# Patient Record
Sex: MALE | Race: BLACK OR AFRICAN AMERICAN | NOT HISPANIC OR LATINO | Employment: OTHER | URBAN - METROPOLITAN AREA
[De-identification: names, ages, dates, MRNs, and addresses within clinical notes are randomized per-mention and may not be internally consistent; named-entity substitution may affect disease eponyms.]

---

## 2024-02-06 ENCOUNTER — HOSPITAL ENCOUNTER (INPATIENT)
Facility: HOSPITAL | Age: 69
LOS: 4 days | Discharge: HOME | DRG: 286 | End: 2024-02-10
Attending: EMERGENCY MEDICINE | Admitting: HOSPITALIST
Payer: MEDICARE

## 2024-02-06 DIAGNOSIS — R07.9 CHEST PAIN: ICD-10-CM

## 2024-02-06 DIAGNOSIS — I50.42 CHRONIC COMBINED SYSTOLIC AND DIASTOLIC CONGESTIVE HEART FAILURE: ICD-10-CM

## 2024-02-06 DIAGNOSIS — I50.9 ACUTE CONGESTIVE HEART FAILURE, UNSPECIFIED HEART FAILURE TYPE: Primary | ICD-10-CM

## 2024-02-06 DIAGNOSIS — R79.89 ELEVATED TROPONIN: ICD-10-CM

## 2024-02-06 DIAGNOSIS — I21.4 NSTEMI (NON-ST ELEVATED MYOCARDIAL INFARCTION): ICD-10-CM

## 2024-02-06 PROBLEM — I10 HYPERTENSION: Status: ACTIVE | Noted: 2024-02-06

## 2024-02-06 PROBLEM — I65.23 CAROTID STENOSIS, ASYMPTOMATIC, BILATERAL: Status: ACTIVE | Noted: 2024-02-06

## 2024-02-06 PROBLEM — I63.9 STROKE: Status: ACTIVE | Noted: 2024-02-06

## 2024-02-06 PROBLEM — E11.9 TYPE 2 DIABETES MELLITUS, WITHOUT LONG-TERM CURRENT USE OF INSULIN: Status: ACTIVE | Noted: 2024-02-06

## 2024-02-06 PROBLEM — I25.10 CAD (CORONARY ARTERY DISEASE): Status: ACTIVE | Noted: 2024-02-06

## 2024-02-06 PROBLEM — I50.43 ACUTE ON CHRONIC COMBINED SYSTOLIC AND DIASTOLIC CONGESTIVE HEART FAILURE: Status: ACTIVE | Noted: 2024-02-06

## 2024-02-06 LAB
ALBUMIN SERPL BCP-MCNC: 3 G/DL (ref 3.5–5.2)
ALBUMIN SERPL BCP-MCNC: 3.1 G/DL (ref 3.5–5.2)
ALP SERPL-CCNC: 63 U/L (ref 55–135)
ALP SERPL-CCNC: 66 U/L (ref 55–135)
ALT SERPL W/O P-5'-P-CCNC: 34 U/L (ref 10–44)
ALT SERPL W/O P-5'-P-CCNC: 35 U/L (ref 10–44)
ANION GAP SERPL CALC-SCNC: 11 MMOL/L (ref 8–16)
ANION GAP SERPL CALC-SCNC: 9 MMOL/L (ref 8–16)
AORTIC ROOT ANNULUS: 3.49 CM
ASCENDING AORTA: 3.62 CM
AST SERPL-CCNC: 22 U/L (ref 10–40)
AST SERPL-CCNC: 24 U/L (ref 10–40)
AV INDEX (PROSTH): 0.54
AV MEAN GRADIENT: 5 MMHG
AV PEAK GRADIENT: 8 MMHG
AV REGURGITATION PRESSURE HALF TIME: 878.63 MS
AV VALVE AREA BY VELOCITY RATIO: 1.81 CM²
AV VALVE AREA: 1.83 CM²
AV VELOCITY RATIO: 0.53
BASOPHILS # BLD AUTO: 0.02 K/UL (ref 0–0.2)
BASOPHILS # BLD AUTO: 0.02 K/UL (ref 0–0.2)
BASOPHILS NFR BLD: 0.5 % (ref 0–1.9)
BASOPHILS NFR BLD: 0.5 % (ref 0–1.9)
BILIRUB SERPL-MCNC: 1.3 MG/DL (ref 0.1–1)
BILIRUB SERPL-MCNC: 1.7 MG/DL (ref 0.1–1)
BNP SERPL-MCNC: 1238 PG/ML (ref 0–99)
BSA FOR ECHO PROCEDURE: 2.69 M2
BUN SERPL-MCNC: 13 MG/DL (ref 8–23)
BUN SERPL-MCNC: 13 MG/DL (ref 8–23)
CALCIUM SERPL-MCNC: 8.8 MG/DL (ref 8.7–10.5)
CALCIUM SERPL-MCNC: 8.9 MG/DL (ref 8.7–10.5)
CHLORIDE SERPL-SCNC: 109 MMOL/L (ref 95–110)
CHLORIDE SERPL-SCNC: 110 MMOL/L (ref 95–110)
CO2 SERPL-SCNC: 20 MMOL/L (ref 23–29)
CO2 SERPL-SCNC: 25 MMOL/L (ref 23–29)
CREAT SERPL-MCNC: 0.9 MG/DL (ref 0.5–1.4)
CREAT SERPL-MCNC: 0.9 MG/DL (ref 0.5–1.4)
CV ECHO LV RWT: 0.5 CM
DIFFERENTIAL METHOD BLD: ABNORMAL
DIFFERENTIAL METHOD BLD: ABNORMAL
DOP CALC AO PEAK VEL: 1.37 M/S
DOP CALC AO VTI: 25.1 CM
DOP CALC LVOT AREA: 3.4 CM2
DOP CALC LVOT DIAMETER: 2.08 CM
DOP CALC LVOT PEAK VEL: 0.73 M/S
DOP CALC LVOT STROKE VOLUME: 45.85 CM3
DOP CALC RVOT PEAK VEL: 0.49 M/S
DOP CALC RVOT VTI: 7.1 CM
DOP CALCLVOT PEAK VEL VTI: 13.5 CM
E WAVE DECELERATION TIME: 119.06 MSEC
E/A RATIO: 2.48
E/E' RATIO: 9.63 M/S
ECHO LV POSTERIOR WALL: 1.52 CM (ref 0.6–1.1)
EOSINOPHIL # BLD AUTO: 0.2 K/UL (ref 0–0.5)
EOSINOPHIL # BLD AUTO: 0.2 K/UL (ref 0–0.5)
EOSINOPHIL NFR BLD: 4.4 % (ref 0–8)
EOSINOPHIL NFR BLD: 4.5 % (ref 0–8)
ERYTHROCYTE [DISTWIDTH] IN BLOOD BY AUTOMATED COUNT: 17.5 % (ref 11.5–14.5)
ERYTHROCYTE [DISTWIDTH] IN BLOOD BY AUTOMATED COUNT: 18 % (ref 11.5–14.5)
EST. GFR  (NO RACE VARIABLE): >60 ML/MIN/1.73 M^2
EST. GFR  (NO RACE VARIABLE): >60 ML/MIN/1.73 M^2
FRACTIONAL SHORTENING: 9 % (ref 28–44)
GLUCOSE SERPL-MCNC: 115 MG/DL (ref 70–110)
GLUCOSE SERPL-MCNC: 133 MG/DL (ref 70–110)
HCT VFR BLD AUTO: 45.6 % (ref 40–54)
HCT VFR BLD AUTO: 47.4 % (ref 40–54)
HCV AB SERPL QL IA: NEGATIVE
HEP C VIRUS HOLD SPECIMEN: NORMAL
HGB BLD-MCNC: 15.2 G/DL (ref 14–18)
HGB BLD-MCNC: 15.5 G/DL (ref 14–18)
HIV 1+2 AB+HIV1 P24 AG SERPL QL IA: NEGATIVE
IMM GRANULOCYTES # BLD AUTO: 0.01 K/UL (ref 0–0.04)
IMM GRANULOCYTES # BLD AUTO: 0.01 K/UL (ref 0–0.04)
IMM GRANULOCYTES NFR BLD AUTO: 0.3 % (ref 0–0.5)
IMM GRANULOCYTES NFR BLD AUTO: 0.3 % (ref 0–0.5)
INTERVENTRICULAR SEPTUM: 1.52 CM (ref 0.6–1.1)
IVC DIAMETER: 2.65 CM
IVRT: 60.89 MSEC
LA MAJOR: 6.08 CM
LA MINOR: 5.85 CM
LA WIDTH: 4.4 CM
LEFT ATRIUM SIZE: 4.93 CM
LEFT ATRIUM VOLUME INDEX: 42.4 ML/M2
LEFT ATRIUM VOLUME: 109.94 CM3
LEFT INTERNAL DIMENSION IN SYSTOLE: 5.55 CM (ref 2.1–4)
LEFT VENTRICLE DIASTOLIC VOLUME INDEX: 71.42 ML/M2
LEFT VENTRICLE DIASTOLIC VOLUME: 184.97 ML
LEFT VENTRICLE MASS INDEX: 171 G/M2
LEFT VENTRICLE SYSTOLIC VOLUME INDEX: 58 ML/M2
LEFT VENTRICLE SYSTOLIC VOLUME: 150.23 ML
LEFT VENTRICULAR INTERNAL DIMENSION IN DIASTOLE: 6.07 CM (ref 3.5–6)
LEFT VENTRICULAR MASS: 443.56 G
LV LATERAL E/E' RATIO: 8.56 M/S
LV SEPTAL E/E' RATIO: 11 M/S
LVOT MG: 1.63 MMHG
LVOT MV: 0.63 CM/S
LYMPHOCYTES # BLD AUTO: 1 K/UL (ref 1–4.8)
LYMPHOCYTES # BLD AUTO: 1.2 K/UL (ref 1–4.8)
LYMPHOCYTES NFR BLD: 25.9 % (ref 18–48)
LYMPHOCYTES NFR BLD: 31.1 % (ref 18–48)
MAGNESIUM SERPL-MCNC: 2 MG/DL (ref 1.6–2.6)
MCH RBC QN AUTO: 29.6 PG (ref 27–31)
MCH RBC QN AUTO: 29.7 PG (ref 27–31)
MCHC RBC AUTO-ENTMCNC: 32.7 G/DL (ref 32–36)
MCHC RBC AUTO-ENTMCNC: 33.3 G/DL (ref 32–36)
MCV RBC AUTO: 89 FL (ref 82–98)
MCV RBC AUTO: 91 FL (ref 82–98)
MONOCYTES # BLD AUTO: 0.4 K/UL (ref 0.3–1)
MONOCYTES # BLD AUTO: 0.5 K/UL (ref 0.3–1)
MONOCYTES NFR BLD: 11 % (ref 4–15)
MONOCYTES NFR BLD: 14 % (ref 4–15)
MV PEAK A VEL: 0.31 M/S
MV PEAK E VEL: 0.77 M/S
MV STENOSIS PRESSURE HALF TIME: 34.53 MS
MV VALVE AREA P 1/2 METHOD: 6.37 CM2
NEUTROPHILS # BLD AUTO: 2 K/UL (ref 1.8–7.7)
NEUTROPHILS # BLD AUTO: 2.1 K/UL (ref 1.8–7.7)
NEUTROPHILS NFR BLD: 52.7 % (ref 38–73)
NEUTROPHILS NFR BLD: 54.8 % (ref 38–73)
NRBC BLD-RTO: 0 /100 WBC
NRBC BLD-RTO: 1 /100 WBC
OHS LV EJECTION FRACTION SIMPSONS BIPLANE MOD: 31 %
OHS QRS DURATION: 98 MS
OHS QTC CALCULATION: 484 MS
PHOSPHATE SERPL-MCNC: 4.3 MG/DL (ref 2.7–4.5)
PISA AR MAX VEL: 1.88 M/S
PISA MRMAX VEL: 3.61 M/S
PISA TR MAX VEL: 2.82 M/S
PLATELET # BLD AUTO: 162 K/UL (ref 150–450)
PLATELET # BLD AUTO: 194 K/UL (ref 150–450)
PMV BLD AUTO: 10.7 FL (ref 9.2–12.9)
PMV BLD AUTO: 11.2 FL (ref 9.2–12.9)
POTASSIUM SERPL-SCNC: 4.1 MMOL/L (ref 3.5–5.1)
POTASSIUM SERPL-SCNC: 4.3 MMOL/L (ref 3.5–5.1)
PROT SERPL-MCNC: 6.5 G/DL (ref 6–8.4)
PROT SERPL-MCNC: 6.6 G/DL (ref 6–8.4)
PV MEAN GRADIENT: 1 MMHG
RA MAJOR: 5.15 CM
RA PRESSURE ESTIMATED: 15 MMHG
RA WIDTH: 3.3 CM
RBC # BLD AUTO: 5.11 M/UL (ref 4.6–6.2)
RBC # BLD AUTO: 5.23 M/UL (ref 4.6–6.2)
RIGHT VENTRICULAR END-DIASTOLIC DIMENSION: 2.96 CM
RV TB RVSP: 18 MMHG
SODIUM SERPL-SCNC: 141 MMOL/L (ref 136–145)
SODIUM SERPL-SCNC: 143 MMOL/L (ref 136–145)
STJ: 3.66 CM
TDI LATERAL: 0.09 M/S
TDI SEPTAL: 0.07 M/S
TDI: 0.08 M/S
TR MAX PG: 32 MMHG
TR MEAN GRADIENT: 32 MMHG
TRICUSPID ANNULAR PLANE SYSTOLIC EXCURSION: 1.64 CM
TROPONIN I SERPL DL<=0.01 NG/ML-MCNC: 0.07 NG/ML (ref 0–0.03)
TROPONIN I SERPL DL<=0.01 NG/ML-MCNC: 0.07 NG/ML (ref 0–0.03)
TROPONIN I SERPL DL<=0.01 NG/ML-MCNC: 0.09 NG/ML (ref 0–0.03)
TROPONIN I SERPL DL<=0.01 NG/ML-MCNC: 0.11 NG/ML (ref 0–0.03)
TV REST PULMONARY ARTERY PRESSURE: 47 MMHG
WBC # BLD AUTO: 3.79 K/UL (ref 3.9–12.7)
WBC # BLD AUTO: 3.83 K/UL (ref 3.9–12.7)
Z-SCORE OF LEFT VENTRICULAR DIMENSION IN END DIASTOLE: -10.82
Z-SCORE OF LEFT VENTRICULAR DIMENSION IN END SYSTOLE: -5.06

## 2024-02-06 PROCEDURE — 85025 COMPLETE CBC W/AUTO DIFF WBC: CPT | Mod: 91 | Performed by: NURSE PRACTITIONER

## 2024-02-06 PROCEDURE — 96374 THER/PROPH/DIAG INJ IV PUSH: CPT

## 2024-02-06 PROCEDURE — 84100 ASSAY OF PHOSPHORUS: CPT | Performed by: NURSE PRACTITIONER

## 2024-02-06 PROCEDURE — 84484 ASSAY OF TROPONIN QUANT: CPT | Mod: 91 | Performed by: NURSE PRACTITIONER

## 2024-02-06 PROCEDURE — 25000003 PHARM REV CODE 250: Performed by: EMERGENCY MEDICINE

## 2024-02-06 PROCEDURE — 93010 ELECTROCARDIOGRAM REPORT: CPT | Mod: ,,, | Performed by: INTERNAL MEDICINE

## 2024-02-06 PROCEDURE — 80053 COMPREHEN METABOLIC PANEL: CPT | Performed by: EMERGENCY MEDICINE

## 2024-02-06 PROCEDURE — 99285 EMERGENCY DEPT VISIT HI MDM: CPT | Mod: 25

## 2024-02-06 PROCEDURE — 25000003 PHARM REV CODE 250: Performed by: NURSE PRACTITIONER

## 2024-02-06 PROCEDURE — 63600175 PHARM REV CODE 636 W HCPCS: Performed by: NURSE PRACTITIONER

## 2024-02-06 PROCEDURE — 85025 COMPLETE CBC W/AUTO DIFF WBC: CPT | Performed by: EMERGENCY MEDICINE

## 2024-02-06 PROCEDURE — 93005 ELECTROCARDIOGRAM TRACING: CPT

## 2024-02-06 PROCEDURE — 83735 ASSAY OF MAGNESIUM: CPT | Performed by: NURSE PRACTITIONER

## 2024-02-06 PROCEDURE — 99223 1ST HOSP IP/OBS HIGH 75: CPT | Mod: 25,,, | Performed by: INTERNAL MEDICINE

## 2024-02-06 PROCEDURE — 21400001 HC TELEMETRY ROOM

## 2024-02-06 PROCEDURE — 80053 COMPREHEN METABOLIC PANEL: CPT | Mod: 91 | Performed by: NURSE PRACTITIONER

## 2024-02-06 PROCEDURE — 84484 ASSAY OF TROPONIN QUANT: CPT | Performed by: EMERGENCY MEDICINE

## 2024-02-06 PROCEDURE — 36415 COLL VENOUS BLD VENIPUNCTURE: CPT | Mod: XB | Performed by: NURSE PRACTITIONER

## 2024-02-06 PROCEDURE — 86803 HEPATITIS C AB TEST: CPT | Performed by: EMERGENCY MEDICINE

## 2024-02-06 PROCEDURE — 87389 HIV-1 AG W/HIV-1&-2 AB AG IA: CPT | Performed by: EMERGENCY MEDICINE

## 2024-02-06 PROCEDURE — 63600175 PHARM REV CODE 636 W HCPCS: Performed by: EMERGENCY MEDICINE

## 2024-02-06 PROCEDURE — 83880 ASSAY OF NATRIURETIC PEPTIDE: CPT | Performed by: EMERGENCY MEDICINE

## 2024-02-06 PROCEDURE — 99900035 HC TECH TIME PER 15 MIN (STAT)

## 2024-02-06 RX ORDER — CARVEDILOL 6.25 MG/1
1 TABLET ORAL 2 TIMES DAILY
Status: ON HOLD | COMMUNITY
End: 2024-02-10 | Stop reason: HOSPADM

## 2024-02-06 RX ORDER — TALC
1 POWDER (GRAM) TOPICAL EVERY MORNING
COMMUNITY

## 2024-02-06 RX ORDER — AMLODIPINE BESYLATE 2.5 MG/1
1 TABLET ORAL DAILY
Status: ON HOLD | COMMUNITY
End: 2024-02-10

## 2024-02-06 RX ORDER — ATORVASTATIN CALCIUM 40 MG/1
80 TABLET, FILM COATED ORAL DAILY
Status: DISCONTINUED | OUTPATIENT
Start: 2024-02-06 | End: 2024-02-07

## 2024-02-06 RX ORDER — ALLOPURINOL 100 MG/1
100 TABLET ORAL DAILY
COMMUNITY
End: 2024-03-05 | Stop reason: SDUPTHER

## 2024-02-06 RX ORDER — ONDANSETRON HYDROCHLORIDE 2 MG/ML
4 INJECTION, SOLUTION INTRAVENOUS EVERY 8 HOURS PRN
Status: DISCONTINUED | OUTPATIENT
Start: 2024-02-06 | End: 2024-02-10 | Stop reason: HOSPADM

## 2024-02-06 RX ORDER — LABETALOL HYDROCHLORIDE 5 MG/ML
10 INJECTION, SOLUTION INTRAVENOUS EVERY 4 HOURS PRN
Status: DISCONTINUED | OUTPATIENT
Start: 2024-02-06 | End: 2024-02-10 | Stop reason: HOSPADM

## 2024-02-06 RX ORDER — AMLODIPINE BESYLATE 2.5 MG/1
2.5 TABLET ORAL DAILY
Status: DISCONTINUED | OUTPATIENT
Start: 2024-02-06 | End: 2024-02-10 | Stop reason: HOSPADM

## 2024-02-06 RX ORDER — FUROSEMIDE 10 MG/ML
40 INJECTION INTRAMUSCULAR; INTRAVENOUS
Status: COMPLETED | OUTPATIENT
Start: 2024-02-06 | End: 2024-02-06

## 2024-02-06 RX ORDER — CLOPIDOGREL BISULFATE 75 MG/1
1 TABLET ORAL DAILY
Status: ON HOLD | COMMUNITY
End: 2024-02-10

## 2024-02-06 RX ORDER — ATORVASTATIN CALCIUM 80 MG/1
80 TABLET, FILM COATED ORAL DAILY
Status: ON HOLD | COMMUNITY
End: 2024-02-10 | Stop reason: HOSPADM

## 2024-02-06 RX ORDER — LISINOPRIL 20 MG/1
40 TABLET ORAL DAILY
Status: DISCONTINUED | OUTPATIENT
Start: 2024-02-06 | End: 2024-02-08

## 2024-02-06 RX ORDER — HEPARIN SODIUM 5000 [USP'U]/ML
5000 INJECTION, SOLUTION INTRAVENOUS; SUBCUTANEOUS EVERY 8 HOURS
Status: DISCONTINUED | OUTPATIENT
Start: 2024-02-06 | End: 2024-02-10 | Stop reason: HOSPADM

## 2024-02-06 RX ORDER — NAPROXEN SODIUM 220 MG/1
81 TABLET, FILM COATED ORAL DAILY
Status: DISCONTINUED | OUTPATIENT
Start: 2024-02-06 | End: 2024-02-10 | Stop reason: HOSPADM

## 2024-02-06 RX ORDER — AMOXICILLIN 250 MG
1 CAPSULE ORAL 2 TIMES DAILY
Status: DISCONTINUED | OUTPATIENT
Start: 2024-02-06 | End: 2024-02-10 | Stop reason: HOSPADM

## 2024-02-06 RX ORDER — SODIUM CHLORIDE 0.9 % (FLUSH) 0.9 %
10 SYRINGE (ML) INJECTION
Status: DISCONTINUED | OUTPATIENT
Start: 2024-02-06 | End: 2024-02-10 | Stop reason: HOSPADM

## 2024-02-06 RX ORDER — ASPIRIN 325 MG
325 TABLET ORAL
Status: DISCONTINUED | OUTPATIENT
Start: 2024-02-06 | End: 2024-02-06

## 2024-02-06 RX ORDER — ACETAMINOPHEN 500 MG
1 TABLET ORAL DAILY
COMMUNITY

## 2024-02-06 RX ORDER — METFORMIN HYDROCHLORIDE 1000 MG/1
1000 TABLET ORAL 2 TIMES DAILY WITH MEALS
COMMUNITY

## 2024-02-06 RX ORDER — CLOPIDOGREL BISULFATE 75 MG/1
75 TABLET ORAL DAILY
Status: DISCONTINUED | OUTPATIENT
Start: 2024-02-06 | End: 2024-02-10 | Stop reason: HOSPADM

## 2024-02-06 RX ORDER — FUROSEMIDE 10 MG/ML
40 INJECTION INTRAMUSCULAR; INTRAVENOUS EVERY 12 HOURS
Status: DISCONTINUED | OUTPATIENT
Start: 2024-02-06 | End: 2024-02-10

## 2024-02-06 RX ORDER — CARVEDILOL 6.25 MG/1
6.25 TABLET ORAL 2 TIMES DAILY
Status: DISCONTINUED | OUTPATIENT
Start: 2024-02-06 | End: 2024-02-08

## 2024-02-06 RX ORDER — LISINOPRIL 40 MG/1
1 TABLET ORAL DAILY
Status: ON HOLD | COMMUNITY
End: 2024-02-10 | Stop reason: HOSPADM

## 2024-02-06 RX ORDER — ACETAMINOPHEN 325 MG/1
650 TABLET ORAL EVERY 6 HOURS PRN
Status: DISCONTINUED | OUTPATIENT
Start: 2024-02-06 | End: 2024-02-08 | Stop reason: SDUPTHER

## 2024-02-06 RX ORDER — ASPIRIN 325 MG
325 TABLET ORAL
Status: COMPLETED | OUTPATIENT
Start: 2024-02-06 | End: 2024-02-06

## 2024-02-06 RX ORDER — LEVOTHYROXINE SODIUM 25 UG/1
25 TABLET ORAL EVERY MORNING
Status: DISCONTINUED | OUTPATIENT
Start: 2024-02-06 | End: 2024-02-10 | Stop reason: HOSPADM

## 2024-02-06 RX ORDER — LEVOTHYROXINE SODIUM 25 UG/1
1 TABLET ORAL EVERY MORNING
COMMUNITY

## 2024-02-06 RX ADMIN — FUROSEMIDE 40 MG: 10 INJECTION, SOLUTION INTRAMUSCULAR; INTRAVENOUS at 10:02

## 2024-02-06 RX ADMIN — AMLODIPINE BESYLATE 2.5 MG: 2.5 TABLET ORAL at 09:02

## 2024-02-06 RX ADMIN — LISINOPRIL 40 MG: 20 TABLET ORAL at 09:02

## 2024-02-06 RX ADMIN — SENNOSIDES AND DOCUSATE SODIUM 1 TABLET: 8.6; 5 TABLET ORAL at 10:02

## 2024-02-06 RX ADMIN — LEVOTHYROXINE SODIUM 25 MCG: 0.03 TABLET ORAL at 07:02

## 2024-02-06 RX ADMIN — CARVEDILOL 6.25 MG: 6.25 TABLET, FILM COATED ORAL at 10:02

## 2024-02-06 RX ADMIN — SENNOSIDES AND DOCUSATE SODIUM 1 TABLET: 8.6; 5 TABLET ORAL at 09:02

## 2024-02-06 RX ADMIN — ASPIRIN 325 MG ORAL TABLET 325 MG: 325 PILL ORAL at 01:02

## 2024-02-06 RX ADMIN — FUROSEMIDE 40 MG: 10 INJECTION, SOLUTION INTRAMUSCULAR; INTRAVENOUS at 02:02

## 2024-02-06 RX ADMIN — ASPIRIN 81 MG CHEWABLE TABLET 81 MG: 81 TABLET CHEWABLE at 09:02

## 2024-02-06 RX ADMIN — CLOPIDOGREL BISULFATE 75 MG: 75 TABLET ORAL at 09:02

## 2024-02-06 RX ADMIN — CARVEDILOL 6.25 MG: 6.25 TABLET, FILM COATED ORAL at 09:02

## 2024-02-06 RX ADMIN — HEPARIN SODIUM 5000 UNITS: 5000 INJECTION INTRAVENOUS; SUBCUTANEOUS at 10:02

## 2024-02-06 RX ADMIN — HEPARIN SODIUM 5000 UNITS: 5000 INJECTION INTRAVENOUS; SUBCUTANEOUS at 02:02

## 2024-02-06 NOTE — ASSESSMENT & PLAN NOTE
Patient with known CAD s/p  NONE , which is uncontrolled Will continue  BB, ASA, Plavix, and Statin and monitor for S/Sx of angina/ACS. Continue to monitor on telemetry.

## 2024-02-06 NOTE — ASSESSMENT & PLAN NOTE
Troponin flat, likely demand  Echo with low EF  Recommend R/LHC once compensated  Cont ASA, statin, BB

## 2024-02-06 NOTE — ED PROVIDER NOTES
SCRIBE #1 NOTE: I, Beatriz Boudreaux, am scribing for, and in the presence of, Hayley Mahan MD. I have scribed the entire note.       History     Chief Complaint   Patient presents with    Chest Pain     C/o CP and HTN. CP comes and goes.      Review of patient's allergies indicates:   Allergen Reactions    Statins-hmg-coa reductase inhibitors Other (See Comments)         History of Present Illness     HPI    2/6/2024, 1:56 AM  History obtained from the patient  Additional history obtained from an independent historian: patient's family at bedside      History of Present Illness: Ron Matthew is a 68 y.o. male patient who presents to the Emergency Department for evaluation of SOB which onset 1 week PTA. Symptoms are constant and moderate in severity. The patient's symptoms are worse with exertion and his family states that he has not been able to sleep or lay flat comfortable for the past few nights. Associated sxs include cough, intermittent CP, and BLE swelling. Patient denies any fever, chills, N/V, abdominal pain, and all other sxs at this time. No prior Tx reported. No further complaints or concerns at this time.       Arrival mode: Personal vehicle    PCP: No primary care provider on file.        Past Medical History:  No past medical history on file.    Past Surgical History:  No past surgical history on file.      Family History:  No family history on file.    Social History:  Social History     Tobacco Use    Smoking status: Not on file    Smokeless tobacco: Not on file   Substance and Sexual Activity    Alcohol use: Not on file    Drug use: Not on file    Sexual activity: Not on file        Review of Systems     Review of Systems   Constitutional:  Negative for chills and fever.   HENT:  Negative for sore throat.    Respiratory:  Positive for cough and shortness of breath.    Cardiovascular:  Positive for chest pain and leg swelling (BLE).   Gastrointestinal:  Negative for abdominal pain, nausea and  vomiting.   Genitourinary:  Negative for dysuria.   Musculoskeletal:  Negative for back pain.   Skin:  Negative for rash.   Neurological:  Negative for weakness.   Hematological:  Does not bruise/bleed easily.   All other systems reviewed and are negative.     Physical Exam     Initial Vitals [02/06/24 0013]   BP Pulse Resp Temp SpO2   (!) 151/95 80 18 97.6 °F (36.4 °C) 98 %      MAP       --          Physical Exam  Nursing Notes and Vital Signs Reviewed.  Constitutional: Patient is in no acute distress. Well-developed and well-nourished.  Head: Atraumatic. Normocephalic.  Eyes: PERRL. EOM intact. Conjunctivae are not pale. No scleral icterus.  ENT: Mucous membranes are moist. Oropharynx is clear and symmetric.    Neck: Supple. Full ROM. No lymphadenopathy. Mild right sided JVD.   Cardiovascular: Regular rate. Regular rhythm. No murmurs, rubs, or gallops. Distal pulses are 2+ and symmetric.  Pulmonary/Chest: No respiratory distress. Clear to auscultation bilaterally. No wheezing or rales.  Abdominal: Soft and non-distended.  There is no tenderness.  No rebound, guarding, or rigidity. Good bowel sounds.  Genitourinary: No CVA tenderness  Musculoskeletal: Moves all extremities. No obvious deformities. 1+ BLE edema. No calf tenderness.  Skin: Warm and dry.  Neurological:  Alert, awake, and appropriate.  Normal speech.  No acute focal neurological deficits are appreciated.  Psychiatric: Normal affect. Good eye contact. Appropriate in content.     ED Course   Critical Care    Date/Time: 2/6/2024 2:46 AM    Performed by: Hayley Mahan MD  Authorized by: Hayley Mahan MD  Direct patient critical care time: 21 minutes  Additional history critical care time: 4 minutes  Ordering / reviewing critical care time: 11 minutes  Documentation critical care time: 7 minutes  Consulting other physicians critical care time: 9 minutes  Total critical care time (exclusive of procedural time) : 52 minutes  Critical care time was  "exclusive of separately billable procedures and treating other patients and teaching time.  Critical care was necessary to treat or prevent imminent or life-threatening deterioration of the following conditions: acute CHF.  Critical care was time spent personally by me on the following activities: blood draw for specimens, development of treatment plan with patient or surrogate, discussions with consultants, interpretation of cardiac output measurements, evaluation of patient's response to treatment, examination of patient, obtaining history from patient or surrogate, ordering and performing treatments and interventions, ordering and review of radiographic studies, ordering and review of laboratory studies, pulse oximetry, re-evaluation of patient's condition and review of old charts.        ED Vital Signs:  Vitals:    02/06/24 0013 02/06/24 0105 02/06/24 0129 02/06/24 0130   BP: (!) 151/95 (!) 148/105     Pulse: 80 91 81    Resp: 18 17     Temp: 97.6 °F (36.4 °C)      TempSrc: Oral      SpO2: 98% 96%     Weight: (!) 140.2 kg (309 lb 1.4 oz)      Height:    6' 1" (1.854 m)    02/06/24 0300 02/06/24 0330 02/06/24 0335 02/06/24 0400   BP: (!) 140/83 (!) 144/94  (!) 162/106   Pulse: 83 89 80 81   Resp: (!) 22 (!) 26 (!) 21 (!) 21   Temp:       TempSrc:       SpO2: 95% 95% 98% 100%   Weight:       Height:        02/06/24 0500 02/06/24 0530   BP: (!) 138/99 (!) 163/106   Pulse: 83 82   Resp: 20 (!) 21   Temp:     TempSrc:     SpO2: 97% 100%   Weight:     Height:         Abnormal Lab Results:  Labs Reviewed   CBC W/ AUTO DIFFERENTIAL - Abnormal; Notable for the following components:       Result Value    WBC 3.83 (*)     RDW 17.5 (*)     nRBC 1 (*)     All other components within normal limits   COMPREHENSIVE METABOLIC PANEL - Abnormal; Notable for the following components:    CO2 20 (*)     Glucose 133 (*)     Albumin 3.0 (*)     Total Bilirubin 1.3 (*)     All other components within normal limits   TROPONIN I - " Abnormal; Notable for the following components:    Troponin I 0.109 (*)     All other components within normal limits   B-TYPE NATRIURETIC PEPTIDE - Abnormal; Notable for the following components:    BNP 1,238 (*)     All other components within normal limits   HIV 1 / 2 ANTIBODY    Narrative:     Release to patient->Immediate   HEPATITIS C ANTIBODY    Narrative:     Release to patient->Immediate   HEP C VIRUS HOLD SPECIMEN    Narrative:     Release to patient->Immediate        All Lab Results:  Results for orders placed or performed during the hospital encounter of 02/06/24   HIV 1/2 Ag/Ab (4th Gen)   Result Value Ref Range    HIV 1/2 Ag/Ab Negative Negative   Hepatitis C Antibody   Result Value Ref Range    Hepatitis C Ab Negative Negative   HCV Virus Hold Specimen   Result Value Ref Range    HEP C Virus Hold Specimen Hold for HCV sendout    CBC auto differential   Result Value Ref Range    WBC 3.83 (L) 3.90 - 12.70 K/uL    RBC 5.11 4.60 - 6.20 M/uL    Hemoglobin 15.2 14.0 - 18.0 g/dL    Hematocrit 45.6 40.0 - 54.0 %    MCV 89 82 - 98 fL    MCH 29.7 27.0 - 31.0 pg    MCHC 33.3 32.0 - 36.0 g/dL    RDW 17.5 (H) 11.5 - 14.5 %    Platelets 194 150 - 450 K/uL    MPV 11.2 9.2 - 12.9 fL    Immature Granulocytes 0.3 0.0 - 0.5 %    Gran # (ANC) 2.0 1.8 - 7.7 K/uL    Immature Grans (Abs) 0.01 0.00 - 0.04 K/uL    Lymph # 1.2 1.0 - 4.8 K/uL    Mono # 0.4 0.3 - 1.0 K/uL    Eos # 0.2 0.0 - 0.5 K/uL    Baso # 0.02 0.00 - 0.20 K/uL    nRBC 1 (A) 0 /100 WBC    Gran % 52.7 38.0 - 73.0 %    Lymph % 31.1 18.0 - 48.0 %    Mono % 11.0 4.0 - 15.0 %    Eosinophil % 4.4 0.0 - 8.0 %    Basophil % 0.5 0.0 - 1.9 %    Differential Method Automated    Comprehensive metabolic panel   Result Value Ref Range    Sodium 141 136 - 145 mmol/L    Potassium 4.3 3.5 - 5.1 mmol/L    Chloride 110 95 - 110 mmol/L    CO2 20 (L) 23 - 29 mmol/L    Glucose 133 (H) 70 - 110 mg/dL    BUN 13 8 - 23 mg/dL    Creatinine 0.9 0.5 - 1.4 mg/dL    Calcium 8.8 8.7 - 10.5  mg/dL    Total Protein 6.5 6.0 - 8.4 g/dL    Albumin 3.0 (L) 3.5 - 5.2 g/dL    Total Bilirubin 1.3 (H) 0.1 - 1.0 mg/dL    Alkaline Phosphatase 63 55 - 135 U/L    AST 24 10 - 40 U/L    ALT 34 10 - 44 U/L    eGFR >60 >60 mL/min/1.73 m^2    Anion Gap 11 8 - 16 mmol/L   Troponin I #1   Result Value Ref Range    Troponin I 0.109 (H) 0.000 - 0.026 ng/mL   BNP   Result Value Ref Range    BNP 1,238 (H) 0 - 99 pg/mL         Imaging Results:  Imaging Results              X-Ray Chest AP Portable (In process)                    Type of Interpretation: ED Physician (Independently Interpreted).  Radiology Procedure Done: Portable CXR.  Interpretation: Cardiomegaly. Pulmonary congestion.             The EKG was ordered, reviewed, and independently interpreted by the ED provider.  Interpretation time: 00:16  Rate: 93 BPM  Rhythm: Sinus rhythm with premature supraventricular complexes  Interpretation: Right axis deviation. Abnormal QRS -T angle, consider primary T wave abnormality. Prolonged QT. No STEMI.           The Emergency Provider reviewed the vital signs and test results, which are outlined above.     ED Discussion     2:45 AM: Discussed case with Elba Carbone NP (Hospital Medicine). Dr. Hebert agrees with current care and management of pt and accepts admission.   Admitting Service: Hospital Medicine  Admitting Physician: Dr. Hebert  Admit to: Inpatient Med/Tele    2:45 AM: Re-evaluated pt. I have discussed test results, shared treatment plan, and the need for admission with patient and family at bedside. Pt and family express understanding at this time and agree with all information. All questions answered. Pt and family have no further questions or concerns at this time. Pt is ready for admit.       Medical Decision Making  Amount and/or Complexity of Data Reviewed  Labs: ordered. Decision-making details documented in ED Course.  Radiology: ordered and independent interpretation performed. Decision-making details  documented in ED Course.  ECG/medicine tests: ordered and independent interpretation performed. Decision-making details documented in ED Course.    Risk  OTC drugs.  Prescription drug management.  Decision regarding hospitalization.    Critical Care  Total time providing critical care: 52 minutes                ED Medication(s):  Medications   aspirin tablet 325 mg (325 mg Oral Given 2/6/24 0133)   furosemide injection 40 mg (40 mg Intravenous Given 2/6/24 0250)       New Prescriptions    No medications on file               Scribe Attestation:   Scribe #1: I performed the above scribed service and the documentation accurately describes the services I performed. I attest to the accuracy of the note.     Attending:   Physician Attestation Statement for Scribe #1: I, Hayley Mahan MD, personally performed the services described in this documentation, as scribed by Beatriz Boudreaux, in my presence, and it is both accurate and complete.           Clinical Impression       ICD-10-CM ICD-9-CM   1. Acute congestive heart failure, unspecified heart failure type  I50.9 428.0   2. Chest pain  R07.9 786.50   3. NSTEMI (non-ST elevated myocardial infarction)  I21.4 410.70       Disposition:   Disposition: Admitted  Condition: Serious         Hayley Mahan MD  02/06/24 0549

## 2024-02-06 NOTE — ASSESSMENT & PLAN NOTE
Patient is identified as having Combined Systolic and Diastolic heart failure that is Acute on chronic. CHF is currently uncontrolled due to Continued edema of extremities and JVD, Rales/crackles on pulmonary exam, and Pulmonary edema/pleural effusion on CXR. Latest ECHO performed and demonstrates- No results found for this or any previous visit.  . Continue Beta Blocker, ACE/ARB, and Furosemide and monitor clinical status closely. Monitor on telemetry. Patient is on CHF pathway.  Monitor strict Is&Os and daily weights.  Place on fluid restriction of 1.5 L. Cardiology has been consulted. Continue to stress to patient importance of self efficacy and  on diet for CHF. Last BNP reviewed- and noted below   Recent Labs   Lab 02/06/24  0136   BNP 1,238*     IV Lasix  Echo   Cardiology consult

## 2024-02-06 NOTE — ASSESSMENT & PLAN NOTE
Chronic, uncontrolled. Latest blood pressure and vitals reviewed-     Temp:  [97.6 °F (36.4 °C)]   Pulse:  [80-91]   Resp:  [17-26]   BP: (135-163)/()   SpO2:  [95 %-100 %] .   Home meds for hypertension were reviewed and noted below.   Hypertension Medications               amLODIPine (NORVASC) 2.5 MG tablet Take 1 tablet by mouth once daily.    carvediloL (COREG) 6.25 MG tablet Take 1 tablet by mouth 2 (two) times daily.    lisinopriL (PRINIVIL,ZESTRIL) 40 MG tablet Take 1 tablet by mouth once daily.            While in the hospital, will manage blood pressure as follows; Continue home antihypertensive regimen    Will utilize p.r.n. blood pressure medication only if patient's blood pressure greater than 140/90 and he develops symptoms such as worsening chest pain or shortness of breath.

## 2024-02-06 NOTE — H&P (VIEW-ONLY)
[facility] - Tele[facility] (Hospital)  Cardiology  Consult Note    Patient Name: Ron Matthew  MRN: [id number]  Admission Date: 2/6/2024  Hospital Length of Stay: 0 days  Code Status: Full Code   Attending Provider: Heidi Hernandez,*   Consulting Provider: Zhane Ta NP  Primary Care Physician: No primary care provider on file.  Principal Problem:Acute on chronic combined systolic and diastolic congestive heart failure    Patient information was obtained from patient and ER records.     Inpatient consult to Cardiology  Consult performed by: Zhane Ta NP  Consult ordered by: Elba Carbone NP        Subjective:     Chief Complaint:  SOB     HPI:   The patient is a 67 yo male with CAD, CHF-EF 45%, DM, HTN, HLD, Gout, JAMES, Prostate cancer, Stroke with right sided hemiparesis in 2020, Carotid dissection and SAH 1/2023 who presented to ED with SOB, BLE edema, and orthopnea x 5 days. Spouse reports he has not slept for several days due SOB. He did complain of chest pain yesterday but it resolved.   In the ED, BP mildly elevated, Afebrile, Oxygenation stable. +tachypnea. WBC 3.8, BNP 1238, Troponin 0.109. EKG-NSR with PACs, prolonged QT, no ischemia. CXR- Cardiomegaly. Pulmonary congestion  Cardiology consulted to assist with management.Pt seen and examined today in ED, c/o SOB, denies any CP at this time. Reports SAH last year on plavix. Labs reviewed, Crt 0.9, troponin flat, BNP elevated 1238 Echo with EF 25-30%, PA 47    Past Medical History:   Diagnosis Date    CAD (coronary artery disease)     Carotid artery dissection     CHF (congestive heart failure)     Gout, unspecified     HLD (hyperlipidemia)     HTN (hypertension)     Hyperparathyroidism, unspecified     JAMES (obstructive sleep apnea)     Prostate cancer     SAH (subarachnoid hemorrhage) 01/2023    Stroke 2020    Type 2 diabetes mellitus without complications        Past Surgical History:   Procedure Laterality Date     ANGIOGRAM, CAROTID      ANGIOGRAM, VERTEBRAL      PARATHYROIDECTOMY         Review of patient's allergies indicates:   Allergen Reactions    Statins-hmg-coa reductase inhibitors Other (See Comments)       No current facility-administered medications on file prior to encounter.     Current Outpatient Medications on File Prior to Encounter   Medication Sig    allopurinoL (ZYLOPRIM) 100 MG tablet Take 100 mg by mouth daily as needed.    amLODIPine (NORVASC) 2.5 MG tablet Take 1 tablet by mouth once daily.    aspirin 81 mg Cap Take 81 mg by mouth once daily.    carvediloL (COREG) 6.25 MG tablet Take 1 tablet by mouth 2 (two) times daily.    cholecalciferol, vitamin D3, (VITAMIN D3) 50 mcg (2,000 unit) Cap capsule Take 1 capsule by mouth once daily.    clopidogreL (PLAVIX) 75 mg tablet Take 1 tablet by mouth once daily.    DOCOSAHEXAENOIC ACID ORAL Take 1 capsule by mouth once daily.    empagliflozin (JARDIANCE) 25 mg tablet Take 1 tablet by mouth once daily.    levothyroxine (SYNTHROID) 25 MCG tablet Take 1 tablet by mouth every morning.    lisinopriL (PRINIVIL,ZESTRIL) 40 MG tablet Take 1 tablet by mouth once daily.    magnesium oxide (MAG-OX) 250 mg magnesium Tab Take 1 tablet by mouth every morning.    metFORMIN (GLUCOPHAGE) 1000 MG tablet Take 1,000 mg by mouth 2 (two) times daily with meals.    atorvastatin (LIPITOR) 80 MG tablet Take 80 mg by mouth once daily.     Family History       Problem Relation (Age of Onset)    Diabetes Mother    Hypertension Mother, Father          Tobacco Use    Smoking status: Never    Smokeless tobacco: Not on file   Substance and Sexual Activity    Alcohol use: Never    Drug use: Never    Sexual activity: Not on file     Review of Systems   Constitutional: Positive for malaise/fatigue.   HENT: Negative.     Eyes: Negative.    Cardiovascular:  Positive for leg swelling.   Respiratory:  Positive for shortness of breath.    Skin: Negative.    Musculoskeletal: Negative.     Gastrointestinal: Negative.    Genitourinary: Negative.    Neurological:  Positive for weakness.   Psychiatric/Behavioral: Negative.       Objective:     Vital Signs (Most Recent):  Temp: 96.2 °F (35.7 °C) (02/06/24 1238)  Pulse: 86 (02/06/24 1238)  Resp: 18 (02/06/24 1238)  BP: 128/83 (02/06/24 1238)  SpO2: 100 % (02/06/24 1238) Vital Signs (24h Range):  Temp:  [96.2 °F (35.7 °C)-98.6 °F (37 °C)] 96.2 °F (35.7 °C)  Pulse:  [80-91] 86  Resp:  [17-26] 18  SpO2:  [95 %-100 %] 100 %  BP: (123-165)/() 128/83     Weight: (!) 140.2 kg (309 lb)  Body mass index is 40.77 kg/m².    SpO2: 100 %         Intake/Output Summary (Last 24 hours) at 2/6/2024 1402  Last data filed at 2/6/2024 0632  Gross per 24 hour   Intake --   Output 1300 ml   Net -1300 ml       Lines/Drains/Airways       Peripheral Intravenous Line  Duration                  Peripheral IV - Single Lumen 02/06/24 0128 18 G Left Antecubital <1 day                     Physical Exam  Vitals and nursing note reviewed.   Constitutional:       Appearance: Normal appearance.   HENT:      Head: Normocephalic and atraumatic.   Eyes:      General:         Right eye: No discharge.         Left eye: No discharge.      Pupils: Pupils are equal, round, and reactive to light.   Cardiovascular:      Rate and Rhythm: Normal rate and regular rhythm.      Heart sounds: S1 normal and S2 normal. No murmur heard.     No friction rub.   Pulmonary:      Effort: Pulmonary effort is normal. No respiratory distress.      Breath sounds: Normal breath sounds. No rales.      Comments: 3LNC  Abdominal:      Palpations: Abdomen is soft.      Tenderness: There is no abdominal tenderness.   Musculoskeletal:      Cervical back: Neck supple.      Right lower leg: Edema present.      Left lower leg: Edema present.   Skin:     General: Skin is warm and dry.   Neurological:      General: No focal deficit present.      Mental Status: He is alert and oriented to person, place, and time.  "  Psychiatric:         Mood and Affect: Mood normal.         Behavior: Behavior normal.         Thought Content: Thought content normal.          Significant Labs: BMP:   Recent Labs   Lab 02/06/24  0136 02/06/24  0654   * 115*    143   K 4.3 4.1    109   CO2 20* 25   BUN 13 13   CREATININE 0.9 0.9   CALCIUM 8.8 8.9   MG  --  2.0   , CMP   Recent Labs   Lab 02/06/24  0136 02/06/24  0654    143   K 4.3 4.1    109   CO2 20* 25   * 115*   BUN 13 13   CREATININE 0.9 0.9   CALCIUM 8.8 8.9   PROT 6.5 6.6   ALBUMIN 3.0* 3.1*   BILITOT 1.3* 1.7*   ALKPHOS 63 66   AST 24 22   ALT 34 35   ANIONGAP 11 9   , CBC   Recent Labs   Lab 02/06/24  0136 02/06/24  0654   WBC 3.83* 3.79*   HGB 15.2 15.5   HCT 45.6 47.4    162   , INR No results for input(s): "INR", "PROTIME" in the last 48 hours., Lipid Panel No results for input(s): "CHOL", "HDL", "LDLCALC", "TRIG", "CHOLHDL" in the last 48 hours., Troponin   Recent Labs   Lab 02/06/24  0136 02/06/24  0654 02/06/24  1334   TROPONINI 0.109* 0.089* 0.073*   , and All pertinent lab results from the last 24 hours have been reviewed.    Significant Imaging: Cardiac Cath: reviewed, Echocardiogram: Transthoracic echo (TTE) complete (Cupid Only):   Results for orders placed or performed during the hospital encounter of 02/06/24   Echo   Result Value Ref Range    BSA 2.69 m2    Greene's Biplane MOD Ejection Fraction 31 %    LVOT stroke volume 45.85 cm3    LVIDd 6.07 (A) 3.5 - 6.0 cm    LV Systolic Volume 150.23 mL    LV Systolic Volume Index 58.0 mL/m2    LVIDs 5.55 (A) 2.1 - 4.0 cm    LV Diastolic Volume 184.97 mL    LV Diastolic Volume Index 71.42 mL/m2    IVS 1.52 (A) 0.6 - 1.1 cm    LVOT diameter 2.08 cm    LVOT area 3.4 cm2    FS 9 (A) 28 - 44 %    Left Ventricle Relative Wall Thickness 0.50 cm    Posterior Wall 1.52 (A) 0.6 - 1.1 cm    LV mass 443.56 g    LV Mass Index 171 g/m2    MV Peak E Simon 0.77 m/s    TDI LATERAL 0.09 m/s    TDI SEPTAL " 0.07 m/s    E/E' ratio 9.63 m/s    MV Peak A Simon 0.31 m/s    TR Max Simon 2.82 m/s    E/A ratio 2.48     IVRT 60.89 msec    E wave deceleration time 119.06 msec    LV SEPTAL E/E' RATIO 11.00 m/s    LV LATERAL E/E' RATIO 8.56 m/s    LVOT peak simon 0.73 m/s    Left Ventricular Outflow Tract Mean Velocity 0.63 cm/s    Left Ventricular Outflow Tract Mean Gradient 1.63 mmHg    RVDD 2.96 cm    RVOT peak VTI 7.1 cm    TAPSE 1.64 cm    LA size 4.93 cm    Left Atrium Minor Axis 5.85 cm    Left Atrium Major Axis 6.08 cm    RA Major Axis 5.15 cm    AV regurgitation pressure 1/2 time [id number] ms    AR Max Simon 1.88 m/s    AV mean gradient 5 mmHg    AV peak gradient 8 mmHg    Ao peak simon 1.37 m/s    Ao VTI 25.10 cm    LVOT peak VTI 13.50 cm    AV valve area 1.83 cm²    AV Velocity Ratio 0.53     AV index (prosthetic) 0.54     GINGER by Velocity Ratio 1.81 cm²    Mr max simon 3.61 m/s    MV stenosis pressure 1/2 time 34.53 ms    MV valve area p 1/2 method 6.37 cm2    TV mean gradient 32 mmHg    Triscuspid Valve Regurgitation Peak Gradient 32 mmHg    PV mean gradient 1 mmHg    RVOT peak simon 0.49 m/s    Ao root annulus 3.49 cm    STJ 3.66 cm    Ascending aorta 3.62 cm    IVC diameter 2.65 cm    Mean e' 0.08 m/s    ZLVIDS -5.06     ZLVIDD -10.82     LA Volume Index 42.4 mL/m2    LA volume 109.94 cm3    LA WIDTH 4.4 cm    RA Width 3.3 cm    TV resting pulmonary artery pressure 47 mmHg    RV TB RVSP 18 mmHg    Est. RA pres 15 mmHg    Narrative      Left Ventricle: The left ventricle is moderately dilated. Normal wall   thickness. Severe global hypokinesis present. There is severely reduced   systolic function with a visually estimated ejection fraction of 25 - 30%.   Grade II diastolic dysfunction.    Right Ventricle: Normal right ventricular cavity size. Wall thickness   is normal. Right ventricle wall motion  is normal. Systolic function is   mildly reduced.    Left Atrium: Left atrium is moderately dilated.    Right Atrium: Right  atrium is mildly dilated.    Aortic Valve: There is mild aortic regurgitation.    Mitral Valve: There is mild regurgitation.    Tricuspid Valve: There is mild regurgitation.    Pulmonary Artery: The estimated pulmonary artery systolic pressure is   47 mmHg.    IVC/SVC: Elevated venous pressure at 15 mmHg.     , EKG: reviewed, Stress Test: reviewed, and X-Ray: CXR: X-Ray Chest 1 View (CXR): No results found for this visit on 02/06/24.  Assessment and Plan:     * Acute on chronic combined systolic and diastolic congestive heart failure  BNP elevated  Echo with 25-30% EF  Cont OMT IV diuresis, coreg, lisinopril  Recommend R/LHC once compensated  Will plan on switching to entresto in future  Strict I/Os  Low Na diet      Stroke  H/o SAH followed by CVT    CAD (coronary artery disease)  Cont asa, statin, BB, plavix  R/LHC once compensated    Hypertension  Titrate medications  Will plan on switching to entresto possibly OP    Elevated troponin  Troponin flat, likely demand  Echo with low EF  Recommend R/LHC once compensated  Cont ASA, statin, BB    Carotid stenosis, asymptomatic, bilateral  Followed by CVT  Cont home regimen        VTE Risk Mitigation (From admission, onward)           Ordered     heparin (porcine) injection 5,000 Units  Every 8 hours         02/06/24 0638     IP VTE HIGH RISK PATIENT  Once         02/06/24 0638     Place sequential compression device  Until discontinued         02/06/24 0638                    Thank you for your consult. I will follow-up with patient. Please contact us if you have any additional questions.    Zhane Ta NP  Cardiology   [facility] - Telemetry ([facility])

## 2024-02-06 NOTE — CARE UPDATE
Update          patient is a 67 yo male with CAD, CHF, DM, HTN, HLD, Gout, JAMES, Prostate cancer, Stroke with right sided hemiparesis in 2020, Carotid dissection and SAH 1/2023 who presented to ED with SOB, BLE edema, and orthopnea x 5 days.     Admitted under Hospital Medicine for acute on chronic combined systolic and diastolic heart failure.    Echo as of today with ejection fraction 25-30% EF.  Plan to continue IV diuretics, Coreg, lisinopril, cardiology plans for possible right/left heart catheterization once patient compensated.    Low-sodium diet,; fluid restriction

## 2024-02-06 NOTE — SUBJECTIVE & OBJECTIVE
Past Medical History:   Diagnosis Date    CAD (coronary artery disease)     Carotid artery dissection     CHF (congestive heart failure)     Gout, unspecified     HLD (hyperlipidemia)     HTN (hypertension)     Hyperparathyroidism, unspecified     JAMES (obstructive sleep apnea)     Prostate cancer     SAH (subarachnoid hemorrhage) 01/2023    Stroke 2020    Type 2 diabetes mellitus without complications        Past Surgical History:   Procedure Laterality Date    ANGIOGRAM, CAROTID      ANGIOGRAM, VERTEBRAL      PARATHYROIDECTOMY         Review of patient's allergies indicates:   Allergen Reactions    Statins-hmg-coa reductase inhibitors Other (See Comments)       No current facility-administered medications on file prior to encounter.     Current Outpatient Medications on File Prior to Encounter   Medication Sig    allopurinoL (ZYLOPRIM) 100 MG tablet Take 100 mg by mouth daily as needed.    amLODIPine (NORVASC) 2.5 MG tablet Take 1 tablet by mouth once daily.    aspirin 81 mg Cap Take 81 mg by mouth once daily.    carvediloL (COREG) 6.25 MG tablet Take 1 tablet by mouth 2 (two) times daily.    cholecalciferol, vitamin D3, (VITAMIN D3) 50 mcg (2,000 unit) Cap capsule Take 1 capsule by mouth once daily.    clopidogreL (PLAVIX) 75 mg tablet Take 1 tablet by mouth once daily.    DOCOSAHEXAENOIC ACID ORAL Take 1 capsule by mouth once daily.    empagliflozin (JARDIANCE) 25 mg tablet Take 1 tablet by mouth once daily.    levothyroxine (SYNTHROID) 25 MCG tablet Take 1 tablet by mouth every morning.    lisinopriL (PRINIVIL,ZESTRIL) 40 MG tablet Take 1 tablet by mouth once daily.    magnesium oxide (MAG-OX) 250 mg magnesium Tab Take 1 tablet by mouth every morning.    metFORMIN (GLUCOPHAGE) 1000 MG tablet Take 1,000 mg by mouth 2 (two) times daily with meals.    atorvastatin (LIPITOR) 80 MG tablet Take 80 mg by mouth once daily.     Family History       Problem Relation (Age of Onset)    Diabetes Mother    Hypertension  Mother, Father          Tobacco Use    Smoking status: Never    Smokeless tobacco: Not on file   Substance and Sexual Activity    Alcohol use: Never    Drug use: Never    Sexual activity: Not on file     Review of Systems   Constitutional: Positive for malaise/fatigue.   HENT: Negative.     Eyes: Negative.    Cardiovascular:  Positive for leg swelling.   Respiratory:  Positive for shortness of breath.    Skin: Negative.    Musculoskeletal: Negative.    Gastrointestinal: Negative.    Genitourinary: Negative.    Neurological:  Positive for weakness.   Psychiatric/Behavioral: Negative.       Objective:     Vital Signs (Most Recent):  Temp: 96.2 °F (35.7 °C) (02/06/24 1238)  Pulse: 86 (02/06/24 1238)  Resp: 18 (02/06/24 1238)  BP: 128/83 (02/06/24 1238)  SpO2: 100 % (02/06/24 1238) Vital Signs (24h Range):  Temp:  [96.2 °F (35.7 °C)-98.6 °F (37 °C)] 96.2 °F (35.7 °C)  Pulse:  [80-91] 86  Resp:  [17-26] 18  SpO2:  [95 %-100 %] 100 %  BP: (123-165)/() 128/83     Weight: (!) 140.2 kg (309 lb)  Body mass index is 40.77 kg/m².    SpO2: 100 %         Intake/Output Summary (Last 24 hours) at 2/6/2024 1402  Last data filed at 2/6/2024 0632  Gross per 24 hour   Intake --   Output 1300 ml   Net -1300 ml       Lines/Drains/Airways       Peripheral Intravenous Line  Duration                  Peripheral IV - Single Lumen 02/06/24 0128 18 G Left Antecubital <1 day                     Physical Exam  Vitals and nursing note reviewed.   Constitutional:       Appearance: Normal appearance.   HENT:      Head: Normocephalic and atraumatic.   Eyes:      General:         Right eye: No discharge.         Left eye: No discharge.      Pupils: Pupils are equal, round, and reactive to light.   Cardiovascular:      Rate and Rhythm: Normal rate and regular rhythm.      Heart sounds: S1 normal and S2 normal. No murmur heard.     No friction rub.   Pulmonary:      Effort: Pulmonary effort is normal. No respiratory distress.      Breath sounds:  "Normal breath sounds. No rales.      Comments: 3LNC  Abdominal:      Palpations: Abdomen is soft.      Tenderness: There is no abdominal tenderness.   Musculoskeletal:      Cervical back: Neck supple.      Right lower leg: Edema present.      Left lower leg: Edema present.   Skin:     General: Skin is warm and dry.   Neurological:      General: No focal deficit present.      Mental Status: He is alert and oriented to person, place, and time.   Psychiatric:         Mood and Affect: Mood normal.         Behavior: Behavior normal.         Thought Content: Thought content normal.          Significant Labs: BMP:   Recent Labs   Lab 02/06/24  0136 02/06/24  0654   * 115*    143   K 4.3 4.1    109   CO2 20* 25   BUN 13 13   CREATININE 0.9 0.9   CALCIUM 8.8 8.9   MG  --  2.0   , CMP   Recent Labs   Lab 02/06/24  0136 02/06/24  0654    143   K 4.3 4.1    109   CO2 20* 25   * 115*   BUN 13 13   CREATININE 0.9 0.9   CALCIUM 8.8 8.9   PROT 6.5 6.6   ALBUMIN 3.0* 3.1*   BILITOT 1.3* 1.7*   ALKPHOS 63 66   AST 24 22   ALT 34 35   ANIONGAP 11 9   , CBC   Recent Labs   Lab 02/06/24  0136 02/06/24  0654   WBC 3.83* 3.79*   HGB 15.2 15.5   HCT 45.6 47.4    162   , INR No results for input(s): "INR", "PROTIME" in the last 48 hours., Lipid Panel No results for input(s): "CHOL", "HDL", "LDLCALC", "TRIG", "CHOLHDL" in the last 48 hours., Troponin   Recent Labs   Lab 02/06/24  0136 02/06/24  0654 02/06/24  1334   TROPONINI 0.109* 0.089* 0.073*   , and All pertinent lab results from the last 24 hours have been reviewed.    Significant Imaging: Cardiac Cath: reviewed, Echocardiogram: Transthoracic echo (TTE) complete (Cupid Only):   Results for orders placed or performed during the hospital encounter of 02/06/24   Echo   Result Value Ref Range    BSA 2.69 m2    Greene's Biplane MOD Ejection Fraction 31 %    LVOT stroke volume 45.85 cm3    LVIDd 6.07 (A) 3.5 - 6.0 cm    LV Systolic Volume 150.23 " mL    LV Systolic Volume Index 58.0 mL/m2    LVIDs 5.55 (A) 2.1 - 4.0 cm    LV Diastolic Volume 184.97 mL    LV Diastolic Volume Index 71.42 mL/m2    IVS 1.52 (A) 0.6 - 1.1 cm    LVOT diameter 2.08 cm    LVOT area 3.4 cm2    FS 9 (A) 28 - 44 %    Left Ventricle Relative Wall Thickness 0.50 cm    Posterior Wall 1.52 (A) 0.6 - 1.1 cm    LV mass 443.56 g    LV Mass Index 171 g/m2    MV Peak E Simon 0.77 m/s    TDI LATERAL 0.09 m/s    TDI SEPTAL 0.07 m/s    E/E' ratio 9.63 m/s    MV Peak A Simon 0.31 m/s    TR Max Simon 2.82 m/s    E/A ratio 2.48     IVRT 60.89 msec    E wave deceleration time 119.06 msec    LV SEPTAL E/E' RATIO 11.00 m/s    LV LATERAL E/E' RATIO 8.56 m/s    LVOT peak simon 0.73 m/s    Left Ventricular Outflow Tract Mean Velocity 0.63 cm/s    Left Ventricular Outflow Tract Mean Gradient 1.63 mmHg    RVDD 2.96 cm    RVOT peak VTI 7.1 cm    TAPSE 1.64 cm    LA size 4.93 cm    Left Atrium Minor Axis 5.85 cm    Left Atrium Major Axis 6.08 cm    RA Major Axis 5.15 cm    AV regurgitation pressure 1/2 time [id number] ms    AR Max Simon 1.88 m/s    AV mean gradient 5 mmHg    AV peak gradient 8 mmHg    Ao peak simon 1.37 m/s    Ao VTI 25.10 cm    LVOT peak VTI 13.50 cm    AV valve area 1.83 cm²    AV Velocity Ratio 0.53     AV index (prosthetic) 0.54     GINGER by Velocity Ratio 1.81 cm²    Mr max simon 3.61 m/s    MV stenosis pressure 1/2 time 34.53 ms    MV valve area p 1/2 method 6.37 cm2    TV mean gradient 32 mmHg    Triscuspid Valve Regurgitation Peak Gradient 32 mmHg    PV mean gradient 1 mmHg    RVOT peak simon 0.49 m/s    Ao root annulus 3.49 cm    STJ 3.66 cm    Ascending aorta 3.62 cm    IVC diameter 2.65 cm    Mean e' 0.08 m/s    ZLVIDS -5.06     ZLVIDD -10.82     LA Volume Index 42.4 mL/m2    LA volume 109.94 cm3    LA WIDTH 4.4 cm    RA Width 3.3 cm    TV resting pulmonary artery pressure 47 mmHg    RV TB RVSP 18 mmHg    Est. RA pres 15 mmHg    Narrative      Left Ventricle: The left ventricle is moderately  dilated. Normal wall   thickness. Severe global hypokinesis present. There is severely reduced   systolic function with a visually estimated ejection fraction of 25 - 30%.   Grade II diastolic dysfunction.    Right Ventricle: Normal right ventricular cavity size. Wall thickness   is normal. Right ventricle wall motion  is normal. Systolic function is   mildly reduced.    Left Atrium: Left atrium is moderately dilated.    Right Atrium: Right atrium is mildly dilated.    Aortic Valve: There is mild aortic regurgitation.    Mitral Valve: There is mild regurgitation.    Tricuspid Valve: There is mild regurgitation.    Pulmonary Artery: The estimated pulmonary artery systolic pressure is   47 mmHg.    IVC/SVC: Elevated venous pressure at 15 mmHg.     , EKG: reviewed, Stress Test: reviewed, and X-Ray: CXR: X-Ray Chest 1 View (CXR): No results found for this visit on 02/06/24.

## 2024-02-06 NOTE — Clinical Note
The left ventricle was injected. The injected rate was 12 mL/sec. The total injected volume was 20 mL.

## 2024-02-06 NOTE — ASSESSMENT & PLAN NOTE
Hx ischemic stroke with right sided weakness and visual deficits 202- mostly resolved   SAH 1/2023  Cont ASA, Plavix, Statin

## 2024-02-06 NOTE — H&P
[facility]  [facility] Medicine  History & Physical    Patient Name: Ron Matthew  MRN: [id number]  Patient Class: IP- Inpatient  Admission Date: 2/6/2024  Attending Physician: Oneal Hebert MD   Primary Care Provider: No primary care provider on file.         Patient information was obtained from spouse/SO and ER records.     Subjective:     Principal Problem:Acute on chronic combined systolic and diastolic congestive heart failure    Chief Complaint:   Chief Complaint   Patient presents with    Chest Pain     C/o CP and HTN. CP comes and goes.         HPI: The patient is a 67 yo male with CAD, CHF-EF 45%, DM, HTN, HLD, Gout, JAMES, Prostate cancer, Stroke with right sided hemiparesis in 2020, Carotid dissection and SAH 1/2023 who presented to ED with SOB, BLE edema, and orthopnea x 5 days. Spouse reports he has not slept for several days due SOB. He did complain of chest pain yesterday but it resolved.     In the ED, BP mildly elevated, Afebrile, Oxygenation stable. +tachypnea. WBC 3.8, BNP 1238, Troponin 0.109. EKG-NSR with PACs, prolonged QT, no ischemia. CXR- Cardiomegaly. Pulmonary congestion.      Past Medical History:   Diagnosis Date    CAD (coronary artery disease)     Carotid artery dissection     CHF (congestive heart failure)     Gout, unspecified     HLD (hyperlipidemia)     HTN (hypertension)     Hyperparathyroidism, unspecified     JAMES (obstructive sleep apnea)     Prostate cancer     SAH (subarachnoid hemorrhage) 01/2023    Stroke 2020    Type 2 diabetes mellitus without complications        Past Surgical History:   Procedure Laterality Date    ANGIOGRAM, CAROTID      ANGIOGRAM, VERTEBRAL      PARATHYROIDECTOMY         Review of patient's allergies indicates:   Allergen Reactions    Statins-hmg-coa reductase inhibitors Other (See Comments)       No current facility-administered medications on file prior to encounter.     Current Outpatient Medications on File Prior to  Encounter   Medication Sig    allopurinoL (ZYLOPRIM) 100 MG tablet Take 100 mg by mouth daily as needed.    amLODIPine (NORVASC) 2.5 MG tablet Take 1 tablet by mouth once daily.    aspirin 81 mg Cap Take 81 mg by mouth once daily.    atorvastatin (LIPITOR) 80 MG tablet Take 80 mg by mouth once daily.    carvediloL (COREG) 6.25 MG tablet Take 1 tablet by mouth 2 (two) times daily.    cholecalciferol, vitamin D3, (VITAMIN D3) 50 mcg (2,000 unit) Cap capsule Take 1 capsule by mouth once daily.    clopidogreL (PLAVIX) 75 mg tablet Take 1 tablet by mouth once daily.    DOCOSAHEXAENOIC ACID ORAL Take 1 capsule by mouth once daily.    empagliflozin (JARDIANCE) 25 mg tablet Take 1 tablet by mouth once daily.    levothyroxine (SYNTHROID) 25 MCG tablet Take 1 tablet by mouth every morning.    lisinopriL (PRINIVIL,ZESTRIL) 40 MG tablet Take 1 tablet by mouth once daily.    magnesium oxide (MAG-OX) 250 mg magnesium Tab Take 1 tablet by mouth every morning.    metFORMIN (GLUCOPHAGE) 1000 MG tablet Take 1,000 mg by mouth 2 (two) times daily with meals.     Family History       Problem Relation (Age of Onset)    Diabetes Mother    Hypertension Mother, Father          Tobacco Use    Smoking status: Never    Smokeless tobacco: Not on file   Substance and Sexual Activity    Alcohol use: Never    Drug use: Never    Sexual activity: Not on file     Review of Systems   Constitutional:  Positive for fatigue. Negative for appetite change, chills, diaphoresis and fever.   HENT:  Negative for congestion, nosebleeds, sore throat and trouble swallowing.    Eyes:  Negative for pain, discharge and visual disturbance.   Respiratory:  Positive for shortness of breath. Negative for apnea, cough, chest tightness, wheezing and stridor.    Cardiovascular:  Positive for chest pain and leg swelling. Negative for palpitations.   Gastrointestinal:  Negative for abdominal distention, abdominal pain, blood in stool, constipation, diarrhea, nausea and  vomiting.   Endocrine: Negative for cold intolerance and heat intolerance.   Genitourinary:  Negative for difficulty urinating, dysuria, flank pain, frequency and urgency.   Musculoskeletal:  Negative for arthralgias, back pain, joint swelling, myalgias, neck pain and neck stiffness.   Skin:  Negative for rash and wound.   Allergic/Immunologic: Negative for food allergies and immunocompromised state.   Neurological:  Positive for weakness. Negative for dizziness, seizures, syncope, facial asymmetry, light-headedness and headaches.   Hematological:  Negative for adenopathy.   Psychiatric/Behavioral:  Negative for agitation, behavioral problems and confusion. The patient is not nervous/anxious.      Objective:     Vital Signs (Most Recent):  Temp: 97.6 °F (36.4 °C) (02/06/24 0013)  Pulse: 90 (02/06/24 0630)  Resp: (!) 21 (02/06/24 0630)  BP: (!) 135/92 (02/06/24 0630)  SpO2: 95 % (02/06/24 0630) Vital Signs (24h Range):  Temp:  [97.6 °F (36.4 °C)] 97.6 °F (36.4 °C)  Pulse:  [80-91] 90  Resp:  [17-26] 21  SpO2:  [95 %-100 %] 95 %  BP: (135-163)/() 135/92     Weight: (!) 140.2 kg (309 lb 1.4 oz)  Body mass index is 40.78 kg/m².     Physical Exam  Vitals and nursing note reviewed.   Constitutional:       General: He is not in acute distress.     Appearance: He is well-developed. He is not diaphoretic.   HENT:      Head: Normocephalic and atraumatic.      Nose: Nose normal.   Eyes:      General: No scleral icterus.     Conjunctiva/sclera: Conjunctivae normal.   Cardiovascular:      Rate and Rhythm: Normal rate and regular rhythm.      Heart sounds: Normal heart sounds. No murmur heard.     No friction rub. No gallop.   Pulmonary:      Effort: Tachypnea and accessory muscle usage present. No respiratory distress.      Breath sounds: No stridor. Rales present. No wheezing.   Chest:      Chest wall: No tenderness.   Abdominal:      General: Bowel sounds are normal. There is no distension.      Palpations: Abdomen is  soft.      Tenderness: There is no abdominal tenderness. There is no guarding or rebound.   Musculoskeletal:         General: No tenderness or deformity. Normal range of motion.      Cervical back: Normal range of motion and neck supple.      Right lower leg: Edema (+2) present.      Left lower leg: Edema (+2) present.   Skin:     General: Skin is warm and dry.      Coloration: Skin is not pale.      Findings: No erythema or rash.   Neurological:      Mental Status: He is drowsy- arouses easily       No obvious focal deficits   Psychiatric:         Behavior: Behavior normal.         Thought Content: Thought content normal.                Significant Labs: All pertinent labs within the past 24 hours have been reviewed.    Significant Imaging: I have reviewed all pertinent imaging results/findings within the past 24 hours.  Assessment/Plan:     * Acute on chronic combined systolic and diastolic congestive heart failure  Patient is identified as having Combined Systolic and Diastolic heart failure that is Acute on chronic. CHF is currently uncontrolled due to Continued edema of extremities and JVD, Rales/crackles on pulmonary exam, and Pulmonary edema/pleural effusion on CXR. Latest ECHO performed and demonstrates- No results found for this or any previous visit.  . Continue Beta Blocker, ACE/ARB, and Furosemide and monitor clinical status closely. Monitor on telemetry. Patient is on CHF pathway.  Monitor strict Is&Os and daily weights.  Place on fluid restriction of 1.5 L. Cardiology has been consulted. Continue to stress to patient importance of self efficacy and  on diet for CHF. Last BNP reviewed- and noted below   Recent Labs   Lab 02/06/24  0136   BNP 1,238*     IV Lasix  Echo   Cardiology consult     Elevated troponin  Serial troponin   Cont ASA, BB, Plavix, Statin, ACE  Echo   Consult cardiology      Stroke  Hx ischemic stroke with right sided weakness and visual deficits 202- mostly resolved   SAH  "1/2023  Cont ASA, Plavix, Statin     CAD (coronary artery disease)  Patient with known CAD s/p  NONE , which is uncontrolled Will continue  BB, ASA, Plavix, and Statin and monitor for S/Sx of angina/ACS. Continue to monitor on telemetry.     Hypertension  Chronic, uncontrolled. Latest blood pressure and vitals reviewed-     Temp:  [97.6 °F (36.4 °C)]   Pulse:  [80-91]   Resp:  [17-26]   BP: (135-163)/()   SpO2:  [95 %-100 %] .   Home meds for hypertension were reviewed and noted below.   Hypertension Medications               amLODIPine (NORVASC) 2.5 MG tablet Take 1 tablet by mouth once daily.    carvediloL (COREG) 6.25 MG tablet Take 1 tablet by mouth 2 (two) times daily.    lisinopriL (PRINIVIL,ZESTRIL) 40 MG tablet Take 1 tablet by mouth once daily.            While in the hospital, will manage blood pressure as follows; Continue home antihypertensive regimen    Will utilize p.r.n. blood pressure medication only if patient's blood pressure greater than 140/90 and he develops symptoms such as worsening chest pain or shortness of breath.    Type 2 diabetes mellitus, without long-term current use of insulin  Patient's FSGs are uncontrolled due to hyperglycemia on current medication regimen.  Last A1c reviewed-   Lab Results   Component Value Date    HGBA1C 6.8 (H) 02/02/2023     Most recent fingerstick glucose reviewed- No results for input(s): "POCTGLUCOSE" in the last 24 hours.  Current correctional scale  Low  Maintain anti-hyperglycemic dose as follows-   Antihyperglycemics (From admission, onward)      None          Hold Oral hypoglycemics while patient is in the hospital.       Carotid stenosis, asymptomatic, bilateral  Followed closely by vascular surgery  Internal carotid artery dissection: right ICA dissected and is 100% occluded nothing to do surgically to correct this. No major deficits since the angiogram. We will follow his left carotid disease with yearly duplex.         VTE Risk Mitigation " (From admission, onward)           Ordered     heparin (porcine) injection 5,000 Units  Every 8 hours         02/06/24 0638     IP VTE HIGH RISK PATIENT  Once         02/06/24 0638     Place sequential compression device  Until discontinued         02/06/24 0638                                    Elba Carbone NP  Department of Hospital Medicine  [facility] - Emergency Dept.

## 2024-02-06 NOTE — HPI
The patient is a 67 yo male with CAD, CHF-EF 45%, DM, HTN, HLD, Gout, JAMES, Prostate cancer, Stroke with right sided hemiparesis in 2020, Carotid dissection and SAH 1/2023 who presented to ED with SOB, BLE edema, and orthopnea x 5 days. Spouse reports he has not slept for several days due SOB. He did complain of chest pain yesterday but it resolved.   In the ED, BP mildly elevated, Afebrile, Oxygenation stable. +tachypnea. WBC 3.8, BNP 1238, Troponin 0.109. EKG-NSR with PACs, prolonged QT, no ischemia. CXR- Cardiomegaly. Pulmonary congestion  Cardiology consulted to assist with management.Pt seen and examined today in ED, c/o SOB, denies any CP at this time. Reports SAH last year on plavix. Labs reviewed, Crt 0.9, troponin flat, BNP elevated 1238 Echo with EF 25-30%, PA 47

## 2024-02-06 NOTE — CONSULTS
[facility] - Telemetry (Hospital)  Cardiology  Consult Note    Patient Name: Ron Matthew  MRN: [id number]  Admission Date: 2/6/2024  Hospital Length of Stay: 0 days  Code Status: Full Code   Attending Provider: Heidi Hernandez,*   Consulting Provider: Zhane Ta NP  Primary Care Physician: No primary care provider on file.  Principal Problem:Acute on chronic combined systolic and diastolic congestive heart failure    Patient information was obtained from patient and ER records.     Inpatient consult to Cardiology  Consult performed by: Zhane Ta NP  Consult ordered by: Elba Carbone NP        Subjective:     Chief Complaint:  SOB     HPI:   The patient is a 67 yo male with CAD, CHF-EF 45%, DM, HTN, HLD, Gout, JAMES, Prostate cancer, Stroke with right sided hemiparesis in 2020, Carotid dissection and SAH 1/2023 who presented to ED with SOB, BLE edema, and orthopnea x 5 days. Spouse reports he has not slept for several days due SOB. He did complain of chest pain yesterday but it resolved.   In the ED, BP mildly elevated, Afebrile, Oxygenation stable. +tachypnea. WBC 3.8, BNP 1238, Troponin 0.109. EKG-NSR with PACs, prolonged QT, no ischemia. CXR- Cardiomegaly. Pulmonary congestion  Cardiology consulted to assist with management.Pt seen and examined today in ED, c/o SOB, denies any CP at this time. Reports SAH last year on plavix. Labs reviewed, Crt 0.9, troponin flat, BNP elevated 1238 Echo with EF 25-30%, PA 47    Past Medical History:   Diagnosis Date    CAD (coronary artery disease)     Carotid artery dissection     CHF (congestive heart failure)     Gout, unspecified     HLD (hyperlipidemia)     HTN (hypertension)     Hyperparathyroidism, unspecified     JAMES (obstructive sleep apnea)     Prostate cancer     SAH (subarachnoid hemorrhage) 01/2023    Stroke 2020    Type 2 diabetes mellitus without complications        Past Surgical History:   Procedure Laterality Date     ANGIOGRAM, CAROTID      ANGIOGRAM, VERTEBRAL      PARATHYROIDECTOMY         Review of patient's allergies indicates:   Allergen Reactions    Statins-hmg-coa reductase inhibitors Other (See Comments)       No current facility-administered medications on file prior to encounter.     Current Outpatient Medications on File Prior to Encounter   Medication Sig    allopurinoL (ZYLOPRIM) 100 MG tablet Take 100 mg by mouth daily as needed.    amLODIPine (NORVASC) 2.5 MG tablet Take 1 tablet by mouth once daily.    aspirin 81 mg Cap Take 81 mg by mouth once daily.    carvediloL (COREG) 6.25 MG tablet Take 1 tablet by mouth 2 (two) times daily.    cholecalciferol, vitamin D3, (VITAMIN D3) 50 mcg (2,000 unit) Cap capsule Take 1 capsule by mouth once daily.    clopidogreL (PLAVIX) 75 mg tablet Take 1 tablet by mouth once daily.    DOCOSAHEXAENOIC ACID ORAL Take 1 capsule by mouth once daily.    empagliflozin (JARDIANCE) 25 mg tablet Take 1 tablet by mouth once daily.    levothyroxine (SYNTHROID) 25 MCG tablet Take 1 tablet by mouth every morning.    lisinopriL (PRINIVIL,ZESTRIL) 40 MG tablet Take 1 tablet by mouth once daily.    magnesium oxide (MAG-OX) 250 mg magnesium Tab Take 1 tablet by mouth every morning.    metFORMIN (GLUCOPHAGE) 1000 MG tablet Take 1,000 mg by mouth 2 (two) times daily with meals.    atorvastatin (LIPITOR) 80 MG tablet Take 80 mg by mouth once daily.     Family History       Problem Relation (Age of Onset)    Diabetes Mother    Hypertension Mother, Father          Tobacco Use    Smoking status: Never    Smokeless tobacco: Not on file   Substance and Sexual Activity    Alcohol use: Never    Drug use: Never    Sexual activity: Not on file     Review of Systems   Constitutional: Positive for malaise/fatigue.   HENT: Negative.     Eyes: Negative.    Cardiovascular:  Positive for leg swelling.   Respiratory:  Positive for shortness of breath.    Skin: Negative.    Musculoskeletal: Negative.     Gastrointestinal: Negative.    Genitourinary: Negative.    Neurological:  Positive for weakness.   Psychiatric/Behavioral: Negative.       Objective:     Vital Signs (Most Recent):  Temp: 96.2 °F (35.7 °C) (02/06/24 1238)  Pulse: 86 (02/06/24 1238)  Resp: 18 (02/06/24 1238)  BP: 128/83 (02/06/24 1238)  SpO2: 100 % (02/06/24 1238) Vital Signs (24h Range):  Temp:  [96.2 °F (35.7 °C)-98.6 °F (37 °C)] 96.2 °F (35.7 °C)  Pulse:  [80-91] 86  Resp:  [17-26] 18  SpO2:  [95 %-100 %] 100 %  BP: (123-165)/() 128/83     Weight: (!) 140.2 kg (309 lb)  Body mass index is 40.77 kg/m².    SpO2: 100 %         Intake/Output Summary (Last 24 hours) at 2/6/2024 1402  Last data filed at 2/6/2024 0632  Gross per 24 hour   Intake --   Output 1300 ml   Net -1300 ml       Lines/Drains/Airways       Peripheral Intravenous Line  Duration                  Peripheral IV - Single Lumen 02/06/24 0128 18 G Left Antecubital <1 day                     Physical Exam  Vitals and nursing note reviewed.   Constitutional:       Appearance: Normal appearance.   HENT:      Head: Normocephalic and atraumatic.   Eyes:      General:         Right eye: No discharge.         Left eye: No discharge.      Pupils: Pupils are equal, round, and reactive to light.   Cardiovascular:      Rate and Rhythm: Normal rate and regular rhythm.      Heart sounds: S1 normal and S2 normal. No murmur heard.     No friction rub.   Pulmonary:      Effort: Pulmonary effort is normal. No respiratory distress.      Breath sounds: Normal breath sounds. No rales.      Comments: 3LNC  Abdominal:      Palpations: Abdomen is soft.      Tenderness: There is no abdominal tenderness.   Musculoskeletal:      Cervical back: Neck supple.      Right lower leg: Edema present.      Left lower leg: Edema present.   Skin:     General: Skin is warm and dry.   Neurological:      General: No focal deficit present.      Mental Status: He is alert and oriented to person, place, and time.  "  Psychiatric:         Mood and Affect: Mood normal.         Behavior: Behavior normal.         Thought Content: Thought content normal.          Significant Labs: BMP:   Recent Labs   Lab 02/06/24  0136 02/06/24  0654   * 115*    143   K 4.3 4.1    109   CO2 20* 25   BUN 13 13   CREATININE 0.9 0.9   CALCIUM 8.8 8.9   MG  --  2.0   , CMP   Recent Labs   Lab 02/06/24  0136 02/06/24  0654    143   K 4.3 4.1    109   CO2 20* 25   * 115*   BUN 13 13   CREATININE 0.9 0.9   CALCIUM 8.8 8.9   PROT 6.5 6.6   ALBUMIN 3.0* 3.1*   BILITOT 1.3* 1.7*   ALKPHOS 63 66   AST 24 22   ALT 34 35   ANIONGAP 11 9   , CBC   Recent Labs   Lab 02/06/24  0136 02/06/24  0654   WBC 3.83* 3.79*   HGB 15.2 15.5   HCT 45.6 47.4    162   , INR No results for input(s): "INR", "PROTIME" in the last 48 hours., Lipid Panel No results for input(s): "CHOL", "HDL", "LDLCALC", "TRIG", "CHOLHDL" in the last 48 hours., Troponin   Recent Labs   Lab 02/06/24  0136 02/06/24  0654 02/06/24  1334   TROPONINI 0.109* 0.089* 0.073*   , and All pertinent lab results from the last 24 hours have been reviewed.    Significant Imaging: Cardiac Cath: reviewed, Echocardiogram: Transthoracic echo (TTE) complete (Cupid Only):   Results for orders placed or performed during the hospital encounter of 02/06/24   Echo   Result Value Ref Range    BSA 2.69 m2    Greene's Biplane MOD Ejection Fraction 31 %    LVOT stroke volume 45.85 cm3    LVIDd 6.07 (A) 3.5 - 6.0 cm    LV Systolic Volume 150.23 mL    LV Systolic Volume Index 58.0 mL/m2    LVIDs 5.55 (A) 2.1 - 4.0 cm    LV Diastolic Volume 184.97 mL    LV Diastolic Volume Index 71.42 mL/m2    IVS 1.52 (A) 0.6 - 1.1 cm    LVOT diameter 2.08 cm    LVOT area 3.4 cm2    FS 9 (A) 28 - 44 %    Left Ventricle Relative Wall Thickness 0.50 cm    Posterior Wall 1.52 (A) 0.6 - 1.1 cm    LV mass 443.56 g    LV Mass Index 171 g/m2    MV Peak E Simon 0.77 m/s    TDI LATERAL 0.09 m/s    TDI SEPTAL " 0.07 m/s    E/E' ratio 9.63 m/s    MV Peak A Simon 0.31 m/s    TR Max Simon 2.82 m/s    E/A ratio 2.48     IVRT 60.89 msec    E wave deceleration time 119.06 msec    LV SEPTAL E/E' RATIO 11.00 m/s    LV LATERAL E/E' RATIO 8.56 m/s    LVOT peak simon 0.73 m/s    Left Ventricular Outflow Tract Mean Velocity 0.63 cm/s    Left Ventricular Outflow Tract Mean Gradient 1.63 mmHg    RVDD 2.96 cm    RVOT peak VTI 7.1 cm    TAPSE 1.64 cm    LA size 4.93 cm    Left Atrium Minor Axis 5.85 cm    Left Atrium Major Axis 6.08 cm    RA Major Axis 5.15 cm    AV regurgitation pressure 1/2 time [id number] ms    AR Max Simon 1.88 m/s    AV mean gradient 5 mmHg    AV peak gradient 8 mmHg    Ao peak simon 1.37 m/s    Ao VTI 25.10 cm    LVOT peak VTI 13.50 cm    AV valve area 1.83 cm²    AV Velocity Ratio 0.53     AV index (prosthetic) 0.54     GINGER by Velocity Ratio 1.81 cm²    Mr max simon 3.61 m/s    MV stenosis pressure 1/2 time 34.53 ms    MV valve area p 1/2 method 6.37 cm2    TV mean gradient 32 mmHg    Triscuspid Valve Regurgitation Peak Gradient 32 mmHg    PV mean gradient 1 mmHg    RVOT peak simon 0.49 m/s    Ao root annulus 3.49 cm    STJ 3.66 cm    Ascending aorta 3.62 cm    IVC diameter 2.65 cm    Mean e' 0.08 m/s    ZLVIDS -5.06     ZLVIDD -10.82     LA Volume Index 42.4 mL/m2    LA volume 109.94 cm3    LA WIDTH 4.4 cm    RA Width 3.3 cm    TV resting pulmonary artery pressure 47 mmHg    RV TB RVSP 18 mmHg    Est. RA pres 15 mmHg    Narrative      Left Ventricle: The left ventricle is moderately dilated. Normal wall   thickness. Severe global hypokinesis present. There is severely reduced   systolic function with a visually estimated ejection fraction of 25 - 30%.   Grade II diastolic dysfunction.    Right Ventricle: Normal right ventricular cavity size. Wall thickness   is normal. Right ventricle wall motion  is normal. Systolic function is   mildly reduced.    Left Atrium: Left atrium is moderately dilated.    Right Atrium: Right  atrium is mildly dilated.    Aortic Valve: There is mild aortic regurgitation.    Mitral Valve: There is mild regurgitation.    Tricuspid Valve: There is mild regurgitation.    Pulmonary Artery: The estimated pulmonary artery systolic pressure is   47 mmHg.    IVC/SVC: Elevated venous pressure at 15 mmHg.     , EKG: reviewed, Stress Test: reviewed, and X-Ray: CXR: X-Ray Chest 1 View (CXR): No results found for this visit on 02/06/24.  Assessment and Plan:     * Acute on chronic combined systolic and diastolic congestive heart failure  BNP elevated  Echo with 25-30% EF  Cont OMT IV diuresis, coreg, lisinopril  Recommend R/LHC once compensated  Will plan on switching to entresto in future  Strict I/Os  Low Na diet      Stroke  H/o SAH followed by CVT    CAD (coronary artery disease)  Cont asa, statin, BB, plavix  R/LHC once compensated    Hypertension  Titrate medications  Will plan on switching to entresto possibly OP    Elevated troponin  Troponin flat, likely demand  Echo with low EF  Recommend R/LHC once compensated  Cont ASA, statin, BB    Carotid stenosis, asymptomatic, bilateral  Followed by CVT  Cont home regimen        VTE Risk Mitigation (From admission, onward)           Ordered     heparin (porcine) injection 5,000 Units  Every 8 hours         02/06/24 0638     IP VTE HIGH RISK PATIENT  Once         02/06/24 0638     Place sequential compression device  Until discontinued         02/06/24 0638                    Thank you for your consult. I will follow-up with patient. Please contact us if you have any additional questions.    Zhane Ta NP  Cardiology   [facility] - Telemetry ([facility])

## 2024-02-06 NOTE — SUBJECTIVE & OBJECTIVE
Past Medical History:   Diagnosis Date    CAD (coronary artery disease)     Carotid artery dissection     CHF (congestive heart failure)     Gout, unspecified     HLD (hyperlipidemia)     HTN (hypertension)     Hyperparathyroidism, unspecified     JAMES (obstructive sleep apnea)     Prostate cancer     SAH (subarachnoid hemorrhage) 01/2023    Stroke 2020    Type 2 diabetes mellitus without complications        Past Surgical History:   Procedure Laterality Date    ANGIOGRAM, CAROTID      ANGIOGRAM, VERTEBRAL      PARATHYROIDECTOMY         Review of patient's allergies indicates:   Allergen Reactions    Statins-hmg-coa reductase inhibitors Other (See Comments)       No current facility-administered medications on file prior to encounter.     Current Outpatient Medications on File Prior to Encounter   Medication Sig    allopurinoL (ZYLOPRIM) 100 MG tablet Take 100 mg by mouth daily as needed.    amLODIPine (NORVASC) 2.5 MG tablet Take 1 tablet by mouth once daily.    aspirin 81 mg Cap Take 81 mg by mouth once daily.    atorvastatin (LIPITOR) 80 MG tablet Take 80 mg by mouth once daily.    carvediloL (COREG) 6.25 MG tablet Take 1 tablet by mouth 2 (two) times daily.    cholecalciferol, vitamin D3, (VITAMIN D3) 50 mcg (2,000 unit) Cap capsule Take 1 capsule by mouth once daily.    clopidogreL (PLAVIX) 75 mg tablet Take 1 tablet by mouth once daily.    DOCOSAHEXAENOIC ACID ORAL Take 1 capsule by mouth once daily.    empagliflozin (JARDIANCE) 25 mg tablet Take 1 tablet by mouth once daily.    levothyroxine (SYNTHROID) 25 MCG tablet Take 1 tablet by mouth every morning.    lisinopriL (PRINIVIL,ZESTRIL) 40 MG tablet Take 1 tablet by mouth once daily.    magnesium oxide (MAG-OX) 250 mg magnesium Tab Take 1 tablet by mouth every morning.    metFORMIN (GLUCOPHAGE) 1000 MG tablet Take 1,000 mg by mouth 2 (two) times daily with meals.     Family History       Problem Relation (Age of Onset)    Diabetes Mother    Hypertension  Mother, Father          Tobacco Use    Smoking status: Never    Smokeless tobacco: Not on file   Substance and Sexual Activity    Alcohol use: Never    Drug use: Never    Sexual activity: Not on file     Review of Systems   Constitutional:  Positive for fatigue. Negative for appetite change, chills, diaphoresis and fever.   HENT:  Negative for congestion, nosebleeds, sore throat and trouble swallowing.    Eyes:  Negative for pain, discharge and visual disturbance.   Respiratory:  Positive for shortness of breath. Negative for apnea, cough, chest tightness, wheezing and stridor.    Cardiovascular:  Positive for chest pain and leg swelling. Negative for palpitations.   Gastrointestinal:  Negative for abdominal distention, abdominal pain, blood in stool, constipation, diarrhea, nausea and vomiting.   Endocrine: Negative for cold intolerance and heat intolerance.   Genitourinary:  Negative for difficulty urinating, dysuria, flank pain, frequency and urgency.   Musculoskeletal:  Negative for arthralgias, back pain, joint swelling, myalgias, neck pain and neck stiffness.   Skin:  Negative for rash and wound.   Allergic/Immunologic: Negative for food allergies and immunocompromised state.   Neurological:  Positive for weakness. Negative for dizziness, seizures, syncope, facial asymmetry, light-headedness and headaches.   Hematological:  Negative for adenopathy.   Psychiatric/Behavioral:  Negative for agitation, behavioral problems and confusion. The patient is not nervous/anxious.      Objective:     Vital Signs (Most Recent):  Temp: 97.6 °F (36.4 °C) (02/06/24 0013)  Pulse: 90 (02/06/24 0630)  Resp: (!) 21 (02/06/24 0630)  BP: (!) 135/92 (02/06/24 0630)  SpO2: 95 % (02/06/24 0630) Vital Signs (24h Range):  Temp:  [97.6 °F (36.4 °C)] 97.6 °F (36.4 °C)  Pulse:  [80-91] 90  Resp:  [17-26] 21  SpO2:  [95 %-100 %] 95 %  BP: (135-163)/() 135/92     Weight: (!) 140.2 kg (309 lb 1.4 oz)  Body mass index is 40.78 kg/m².      Physical Exam  Vitals and nursing note reviewed.   Constitutional:       General: He is not in acute distress.     Appearance: He is well-developed. He is not diaphoretic.   HENT:      Head: Normocephalic and atraumatic.      Nose: Nose normal.   Eyes:      General: No scleral icterus.     Conjunctiva/sclera: Conjunctivae normal.   Cardiovascular:      Rate and Rhythm: Normal rate and regular rhythm.      Heart sounds: Normal heart sounds. No murmur heard.     No friction rub. No gallop.   Pulmonary:      Effort: Tachypnea and accessory muscle usage present. No respiratory distress.      Breath sounds: No stridor. Rales present. No wheezing.   Chest:      Chest wall: No tenderness.   Abdominal:      General: Bowel sounds are normal. There is no distension.      Palpations: Abdomen is soft.      Tenderness: There is no abdominal tenderness. There is no guarding or rebound.   Musculoskeletal:         General: No tenderness or deformity. Normal range of motion.      Cervical back: Normal range of motion and neck supple.      Right lower leg: Edema (+2) present.      Left lower leg: Edema (+2) present.   Skin:     General: Skin is warm and dry.      Coloration: Skin is not pale.      Findings: No erythema or rash.   Neurological:      Mental Status: He is alert and oriented to person, place, and time.      Cranial Nerves: No cranial nerve deficit.      Motor: No abnormal muscle tone.      Coordination: Coordination normal.      Deep Tendon Reflexes: Reflexes are normal and symmetric.   Psychiatric:         Behavior: Behavior normal.         Thought Content: Thought content normal.                Significant Labs: All pertinent labs within the past 24 hours have been reviewed.    Significant Imaging: I have reviewed all pertinent imaging results/findings within the past 24 hours.

## 2024-02-06 NOTE — ED NOTES
Received report on patient, assumed care. Patient AAOX4, respirations even/unlabored, CM lead 2 NSR. Patient denies any complaints.

## 2024-02-06 NOTE — PHARMACY MED REC
"Admission Medication History     The home medication history was taken by Jalen Park.    You may go to "Admission" then "Reconcile Home Medications" tabs to review and/or act upon these items.     The home medication list has been updated by the Pharmacy department.   Please read ALL comments highlighted in yellow.   Please address this information as you see fit.    Feel free to contact us if you have any questions or require assistance.      Medications listed below were obtained from: Patient/family and Medications brought from home  (Not in a hospital admission)      Jalen Park  [id number]    Current Outpatient Medications on File Prior to Encounter   Medication Sig Dispense Refill Last Dose    allopurinoL (ZYLOPRIM) 100 MG tablet Take 100 mg by mouth daily as needed.   2/5/2024    amLODIPine (NORVASC) 2.5 MG tablet Take 1 tablet by mouth once daily.   2/5/2024    aspirin 81 mg Cap Take 81 mg by mouth once daily.   2/5/2024    carvediloL (COREG) 6.25 MG tablet Take 1 tablet by mouth 2 (two) times daily.   2/5/2024    cholecalciferol, vitamin D3, (VITAMIN D3) 50 mcg (2,000 unit) Cap capsule Take 1 capsule by mouth once daily.   2/5/2024    clopidogreL (PLAVIX) 75 mg tablet Take 1 tablet by mouth once daily.   2/5/2024    DOCOSAHEXAENOIC ACID ORAL Take 1 capsule by mouth once daily.   2/5/2024    empagliflozin (JARDIANCE) 25 mg tablet Take 1 tablet by mouth once daily.   2/5/2024    levothyroxine (SYNTHROID) 25 MCG tablet Take 1 tablet by mouth every morning.   2/5/2024    lisinopriL (PRINIVIL,ZESTRIL) 40 MG tablet Take 1 tablet by mouth once daily.   2/5/2024    magnesium oxide (MAG-OX) 250 mg magnesium Tab Take 1 tablet by mouth every morning.   2/5/2024    metFORMIN (GLUCOPHAGE) 1000 MG tablet Take 1,000 mg by mouth 2 (two) times daily with meals.   2/5/2024       .          "

## 2024-02-06 NOTE — ASSESSMENT & PLAN NOTE
Followed closely by vascular surgery  Internal carotid artery dissection: right ICA dissected and is 100% occluded nothing to do surgically to correct this. No major deficits since the angiogram. We will follow his left carotid disease with yearly duplex.

## 2024-02-06 NOTE — HPI
The patient is a 67 yo male with CAD, CHF-EF 45%, DM, HTN, HLD, Gout, JAMES, Prostate cancer, Stroke with right sided hemiparesis in 2020, Carotid dissection and SAH 1/2023 who presented to ED with SOB, BLE edema, and orthopnea x 5 days. Spouse reports he has not slept for several days due SOB. He did complain of chest pain yesterday but it resolved.     In the ED, BP mildly elevated, Afebrile, Oxygenation stable. +tachypnea. WBC 3.8, BNP 1238, Troponin 0.109. EKG-NSR with PACs, prolonged QT, no ischemia. CXR- Cardiomegaly. Pulmonary congestion.

## 2024-02-06 NOTE — ASSESSMENT & PLAN NOTE
BNP elevated  Echo with 25-30% EF  Cont OMT IV diuresis, coreg, lisinopril  Recommend R/LHC once compensated  Will plan on switching to entresto in future  Strict I/Os  Low Na diet

## 2024-02-06 NOTE — ASSESSMENT & PLAN NOTE
"Patient's FSGs are uncontrolled due to hyperglycemia on current medication regimen.  Last A1c reviewed-   Lab Results   Component Value Date    HGBA1C 6.8 (H) 02/02/2023     Most recent fingerstick glucose reviewed- No results for input(s): "POCTGLUCOSE" in the last 24 hours.  Current correctional scale  Low  Maintain anti-hyperglycemic dose as follows-   Antihyperglycemics (From admission, onward)      None          Hold Oral hypoglycemics while patient is in the hospital.     "

## 2024-02-07 PROCEDURE — 21400001 HC TELEMETRY ROOM

## 2024-02-07 PROCEDURE — 63600175 PHARM REV CODE 636 W HCPCS: Performed by: NURSE PRACTITIONER

## 2024-02-07 PROCEDURE — 25000003 PHARM REV CODE 250: Performed by: NURSE PRACTITIONER

## 2024-02-07 PROCEDURE — 99233 SBSQ HOSP IP/OBS HIGH 50: CPT | Mod: ,,,

## 2024-02-07 RX ADMIN — SENNOSIDES AND DOCUSATE SODIUM 1 TABLET: 8.6; 5 TABLET ORAL at 09:02

## 2024-02-07 RX ADMIN — FUROSEMIDE 40 MG: 10 INJECTION, SOLUTION INTRAMUSCULAR; INTRAVENOUS at 09:02

## 2024-02-07 RX ADMIN — AMLODIPINE BESYLATE 2.5 MG: 2.5 TABLET ORAL at 09:02

## 2024-02-07 RX ADMIN — LEVOTHYROXINE SODIUM 25 MCG: 0.03 TABLET ORAL at 05:02

## 2024-02-07 RX ADMIN — HEPARIN SODIUM 5000 UNITS: 5000 INJECTION INTRAVENOUS; SUBCUTANEOUS at 09:02

## 2024-02-07 RX ADMIN — LISINOPRIL 40 MG: 20 TABLET ORAL at 09:02

## 2024-02-07 RX ADMIN — CARVEDILOL 6.25 MG: 6.25 TABLET, FILM COATED ORAL at 09:02

## 2024-02-07 RX ADMIN — ASPIRIN 81 MG CHEWABLE TABLET 81 MG: 81 TABLET CHEWABLE at 09:02

## 2024-02-07 RX ADMIN — HEPARIN SODIUM 5000 UNITS: 5000 INJECTION INTRAVENOUS; SUBCUTANEOUS at 03:02

## 2024-02-07 RX ADMIN — CLOPIDOGREL BISULFATE 75 MG: 75 TABLET ORAL at 09:02

## 2024-02-07 RX ADMIN — HEPARIN SODIUM 5000 UNITS: 5000 INJECTION INTRAVENOUS; SUBCUTANEOUS at 05:02

## 2024-02-07 NOTE — SUBJECTIVE & OBJECTIVE
Review of Systems   Constitutional: Negative.   HENT: Negative.     Eyes: Negative.    Cardiovascular:  Positive for leg swelling.   Respiratory:  Positive for shortness of breath.    Skin: Negative.    Musculoskeletal: Negative.    Gastrointestinal: Negative.    Genitourinary: Negative.    Neurological: Negative.    Psychiatric/Behavioral: Negative.       Objective:     Vital Signs (Most Recent):  Temp: 97.6 °F (36.4 °C) (02/07/24 0442)  Pulse: 81 (02/07/24 0754)  Resp: 18 (02/07/24 0442)  BP: 134/78 (02/07/24 0442)  SpO2: 97 % (02/07/24 0442) Vital Signs (24h Range):  Temp:  [97.6 °F (36.4 °C)-98.4 °F (36.9 °C)] 97.6 °F (36.4 °C)  Pulse:  [75-85] 81  Resp:  [16-18] 18  SpO2:  [95 %-97 %] 97 %  BP: (134-151)/(78-95) 134/78     Weight: 127.9 kg (281 lb 15.5 oz)  Body mass index is 37.2 kg/m².     SpO2: 97 %         Intake/Output Summary (Last 24 hours) at 2/7/2024 1320  Last data filed at 2/6/2024 1700  Gross per 24 hour   Intake 0 ml   Output 500 ml   Net -500 ml       Lines/Drains/Airways       Peripheral Intravenous Line  Duration                  Peripheral IV - Single Lumen 02/06/24 2340 20 G Posterior;Right Hand <1 day                       Physical Exam  Vitals and nursing note reviewed.   Constitutional:       Appearance: He is obese.   HENT:      Head: Normocephalic and atraumatic.   Eyes:      General:         Right eye: No discharge.         Left eye: No discharge.      Pupils: Pupils are equal, round, and reactive to light.   Cardiovascular:      Rate and Rhythm: Normal rate and regular rhythm.      Heart sounds: S1 normal and S2 normal. No murmur heard.     No friction rub.   Pulmonary:      Effort: Pulmonary effort is normal. No respiratory distress.      Breath sounds: Rales present.   Abdominal:      Palpations: Abdomen is soft.      Tenderness: There is no abdominal tenderness.   Musculoskeletal:      Cervical back: Neck supple.      Right lower leg: Edema present.      Left lower leg: Edema  "present.   Skin:     General: Skin is warm and dry.   Neurological:      General: No focal deficit present.      Mental Status: He is alert and oriented to person, place, and time.   Psychiatric:         Mood and Affect: Mood normal.         Behavior: Behavior normal.         Thought Content: Thought content normal.            Significant Labs: BMP:   Recent Labs   Lab 02/06/24  0136 02/06/24  0654   * 115*    143   K 4.3 4.1    109   CO2 20* 25   BUN 13 13   CREATININE 0.9 0.9   CALCIUM 8.8 8.9   MG  --  2.0   , CMP   Recent Labs   Lab 02/06/24  0136 02/06/24  0654    143   K 4.3 4.1    109   CO2 20* 25   * 115*   BUN 13 13   CREATININE 0.9 0.9   CALCIUM 8.8 8.9   PROT 6.5 6.6   ALBUMIN 3.0* 3.1*   BILITOT 1.3* 1.7*   ALKPHOS 63 66   AST 24 22   ALT 34 35   ANIONGAP 11 9   , CBC   Recent Labs   Lab 02/06/24  0136 02/06/24  0654   WBC 3.83* 3.79*   HGB 15.2 15.5   HCT 45.6 47.4    162   , INR No results for input(s): "INR", "PROTIME" in the last 48 hours., Lipid Panel No results for input(s): "CHOL", "HDL", "LDLCALC", "TRIG", "CHOLHDL" in the last 48 hours., Troponin   Recent Labs   Lab 02/06/24  0654 02/06/24  1334 02/06/24  1812   TROPONINI 0.089* 0.073* 0.075*   , and All pertinent lab results from the last 24 hours have been reviewed.    Significant Imaging: Cardiac Cath: reviewed, Echocardiogram: Transthoracic echo (TTE) complete (Cupid Only):   Results for orders placed or performed during the hospital encounter of 02/06/24   Echo   Result Value Ref Range    BSA 2.69 m2    Greene's Biplane MOD Ejection Fraction 31 %    LVOT stroke volume 45.85 cm3    LVIDd 6.07 (A) 3.5 - 6.0 cm    LV Systolic Volume 150.23 mL    LV Systolic Volume Index 58.0 mL/m2    LVIDs 5.55 (A) 2.1 - 4.0 cm    LV Diastolic Volume 184.97 mL    LV Diastolic Volume Index 71.42 mL/m2    IVS 1.52 (A) 0.6 - 1.1 cm    LVOT diameter 2.08 cm    LVOT area 3.4 cm2    FS 9 (A) 28 - 44 %    Left Ventricle " Relative Wall Thickness 0.50 cm    Posterior Wall 1.52 (A) 0.6 - 1.1 cm    LV mass 443.56 g    LV Mass Index 171 g/m2    MV Peak E Simon 0.77 m/s    TDI LATERAL 0.09 m/s    TDI SEPTAL 0.07 m/s    E/E' ratio 9.63 m/s    MV Peak A Simon 0.31 m/s    TR Max Simon 2.82 m/s    E/A ratio 2.48     IVRT 60.89 msec    E wave deceleration time 119.06 msec    LV SEPTAL E/E' RATIO 11.00 m/s    LV LATERAL E/E' RATIO 8.56 m/s    LVOT peak simon 0.73 m/s    Left Ventricular Outflow Tract Mean Velocity 0.63 cm/s    Left Ventricular Outflow Tract Mean Gradient 1.63 mmHg    RVDD 2.96 cm    RVOT peak VTI 7.1 cm    TAPSE 1.64 cm    LA size 4.93 cm    Left Atrium Minor Axis 5.85 cm    Left Atrium Major Axis 6.08 cm    RA Major Axis 5.15 cm    AV regurgitation pressure 1/2 time [id number] ms    AR Max Simon 1.88 m/s    AV mean gradient 5 mmHg    AV peak gradient 8 mmHg    Ao peak simon 1.37 m/s    Ao VTI 25.10 cm    LVOT peak VTI 13.50 cm    AV valve area 1.83 cm²    AV Velocity Ratio 0.53     AV index (prosthetic) 0.54     GINGER by Velocity Ratio 1.81 cm²    Mr max simon 3.61 m/s    MV stenosis pressure 1/2 time 34.53 ms    MV valve area p 1/2 method 6.37 cm2    TV mean gradient 32 mmHg    Triscuspid Valve Regurgitation Peak Gradient 32 mmHg    PV mean gradient 1 mmHg    RVOT peak simon 0.49 m/s    Ao root annulus 3.49 cm    STJ 3.66 cm    Ascending aorta 3.62 cm    IVC diameter 2.65 cm    Mean e' 0.08 m/s    ZLVIDS -5.06     ZLVIDD -10.82     LA Volume Index 42.4 mL/m2    LA volume 109.94 cm3    LA WIDTH 4.4 cm    RA Width 3.3 cm    TV resting pulmonary artery pressure 47 mmHg    RV TB RVSP 18 mmHg    Est. RA pres 15 mmHg    Narrative      Left Ventricle: The left ventricle is moderately dilated. Normal wall   thickness. Severe global hypokinesis present. There is severely reduced   systolic function with a visually estimated ejection fraction of 25 - 30%.   Grade II diastolic dysfunction.    Right Ventricle: Normal right ventricular cavity  size. Wall thickness   is normal. Right ventricle wall motion  is normal. Systolic function is   mildly reduced.    Left Atrium: Left atrium is moderately dilated.    Right Atrium: Right atrium is mildly dilated.    Aortic Valve: There is mild aortic regurgitation.    Mitral Valve: There is mild regurgitation.    Tricuspid Valve: There is mild regurgitation.    Pulmonary Artery: The estimated pulmonary artery systolic pressure is   47 mmHg.    IVC/SVC: Elevated venous pressure at 15 mmHg.     , EKG: reviewed, Stress Test: reviewed, and X-Ray: CXR: X-Ray Chest 1 View (CXR): No results found for this visit on 02/06/24.

## 2024-02-07 NOTE — PLAN OF CARE
Pt resting in bed . No distress . Vitals stable . HOB elevated.  Chart check completed . Will continue to monitor .

## 2024-02-07 NOTE — ASSESSMENT & PLAN NOTE
Troponin flat, likely demand  Echo with low EF  Recommend R/LHC once compensated  Cont ASA, statin, BB    2/7/24  R/LHC tomorrow  Npo mn

## 2024-02-07 NOTE — HOSPITAL COURSE
patient is a 69 yo male with CAD, CHF, DM, HTN, HLD, Gout, JAMES, Prostate cancer, Stroke with right sided hemiparesis in 2020, Carotid dissection and SAH 1/2023 who presented to ED with SOB, BLE edema, and orthopnea x 5 days.      Admitted under Hospital Medicine for acute on chronic combined systolic and diastolic heart failure.    Echo as of today with ejection fraction 25-30% EF.  Plan to continue IV diuretics, Coreg, lisinopril, cardiology plans for possible right/left heart catheterization on 02/08/2024,     s/p [facility] nonobstructive disease EF noted to be 25% on angiogram with dilated cardiomyopathy as of 2/9/24;     On 2/10/24- examination of patient done at bedside; appeared alert and oriented x3; denied acute issues overnight.    Blood pressure is slightly trended down after receiving morning medications, responded to IV fluid bolus.  Denied further dizziness, chest pain, shortness OO breath, palpitations, ambulated without issues.  Blood pressure stabilized.  Patient has been on IV Lasix 40 b.i.d., metolazone during hospital stay.  Discussed with Cardiology, stated okay for discharge on Lasix 40 mg once daily, Entresto, LifeVest, outpatient follow up.     worked on arrangements for LifeVest.    Considering clinical and hemodynamic stability, planning to discharge patient today, emphasized noncompliance with medications, outpatient follow up with PCP/Cardiology, patient/wife at bedside agreed.

## 2024-02-07 NOTE — SUBJECTIVE & OBJECTIVE
Interval History:   No acute events overnight   Stated improvement in dyspnea, able to lie flat without discomfort, however still with dyspnea on exertion    Still with bilateral lower extremity swelling.    On IV diuretics   Cardio planning for cardiac catheterization tomorrow, NPO since midnight.    Expressed allergic to statin, held.        Review of Systems      Constitutional:  Positive for fatigue. Negative for appetite change, chills, diaphoresis and fever.   HENT:  Negative for congestion, nosebleeds, sore throat and trouble swallowing.    Eyes:  Negative for pain, discharge and visual disturbance.   Respiratory:  Positive for shortness of breath. Negative for apnea, cough, chest tightness, wheezing and stridor.    Cardiovascular:  Positive for leg swelling. Negative for palpitations.   Gastrointestinal:  Negative for abdominal distention, abdominal pain, blood in stool, constipation, diarrhea, nausea and vomiting.   Endocrine: Negative for cold intolerance and heat intolerance.   Genitourinary:  Negative for difficulty urinating, dysuria, flank pain, frequency and urgency.   Musculoskeletal:  Negative for arthralgias, back pain, joint swelling, myalgias, neck pain and neck stiffness.   Skin:  Negative for rash and wound.   Allergic/Immunologic: Negative for food allergies and immunocompromised state.   Neurological:  Positive for weakness. Negative for dizziness, seizures, syncope, facial asymmetry, light-headedness and headaches.   Hematological:  Negative for adenopathy.   Psychiatric/Behavioral:  Negative for agitation, behavioral problems and confusion. The patient is not nervous/anxious.        Objective:     Vital Signs (Most Recent):  Temp: 97.6 °F (36.4 °C) (02/07/24 0442)  Pulse: 81 (02/07/24 0754)  Resp: 18 (02/07/24 0442)  BP: 134/78 (02/07/24 0442)  SpO2: 97 % (02/07/24 0442) Vital Signs (24h Range):  Temp:  [96.2 °F (35.7 °C)-98.4 °F (36.9 °C)] 97.6 °F (36.4 °C)  Pulse:  [75-86] 81  Resp:   [16-18] 18  SpO2:  [95 %-100 %] 97 %  BP: (128-151)/(78-95) 134/78     Weight: 127.9 kg (281 lb 15.5 oz)  Body mass index is 37.2 kg/m².    Intake/Output Summary (Last 24 hours) at 2/7/2024 1137  Last data filed at 2/6/2024 1700  Gross per 24 hour   Intake 0 ml   Output 500 ml   Net -500 ml         Physical Exam      Constitutional:       General: He is not in acute distress.     Appearance: He is well-developed. He is not diaphoretic.   HENT:      Head: Normocephalic and atraumatic.      Nose: Nose normal.   Eyes:      General: No scleral icterus.     Conjunctiva/sclera: Conjunctivae normal.   Cardiovascular:      Rate and Rhythm: Normal rate and regular rhythm.      Heart sounds: Normal heart sounds. No murmur heard.     No friction rub. No gallop.   Pulmonary:      Effort: Tachypnea and accessory muscle usage present. No respiratory distress.      Breath sounds: No stridor. Rales present. No wheezing.   Chest:      Chest wall: No tenderness.   Abdominal:      General: Bowel sounds are normal. There is no distension.      Palpations: Abdomen is soft.      Tenderness: There is no abdominal tenderness. There is no guarding or rebound.   Musculoskeletal:         General: No tenderness or deformity. Normal range of motion.      Cervical back: Normal range of motion and neck supple.      Right lower leg: Edema (+2) present.      Left lower leg: Edema (+2) present.   Skin:     General: Skin is warm and dry.      Coloration: Skin is not pale.      Findings: No erythema or rash.   Neurological:      Mental Status: He is alert and oriented to person, place, and time.      Cranial Nerves: No cranial nerve deficit.      Motor: No abnormal muscle tone.      Coordination: Coordination normal.      Deep Tendon Reflexes: Reflexes are normal and symmetric.   Psychiatric:         Behavior: Behavior normal.         Thought Content: Thought content normal.       Significant Labs: All pertinent labs within the past 24 hours have been  reviewed.  CBC:   Recent Labs   Lab 02/06/24  0136 02/06/24  0654   WBC 3.83* 3.79*   HGB 15.2 15.5   HCT 45.6 47.4    162     CMP:   Recent Labs   Lab 02/06/24  0136 02/06/24  0654    143   K 4.3 4.1    109   CO2 20* 25   * 115*   BUN 13 13   CREATININE 0.9 0.9   CALCIUM 8.8 8.9   PROT 6.5 6.6   ALBUMIN 3.0* 3.1*   BILITOT 1.3* 1.7*   ALKPHOS 63 66   AST 24 22   ALT 34 35   ANIONGAP 11 9       Significant Imaging:   Imaging Results              X-Ray Chest AP Portable (Final result)  Result time 02/06/24 08:02:42      Final result by Shahid Louise MD (02/06/24 08:02:42)                   Impression:      As above.      Electronically signed by: Shahid Louise  Date:    02/06/2024  Time:    08:02               Narrative:    EXAMINATION:  XR CHEST AP PORTABLE    CLINICAL HISTORY:  Chest Pain;.    TECHNIQUE:  Single frontal portable view of the chest was performed.    COMPARISON:  None    FINDINGS:  Enlarged cardiomediastinal silhouette presumed related to cardiomegaly.  Pericardial effusion not excluded.  Pulmonary vascular congestion with mild interstitial edema.  Ill-defined opacification left lung base obscured by the heart border.  Left pleural effusion and left retrocardiac airspace disease not excluded.  No pneumothorax.

## 2024-02-07 NOTE — HOSPITAL COURSE
2/7/24 Pt seen and examined today, feels ok reports his SOB is improving, denies any CP at this time. LE edema improving. Labs reviewed, chart reviewed  2.8.2024 complained of left-sided chest pain earlier today, however not retrosternal.     2/9/24 pt seen and examined today s/p [facility] nonobstructive disease EF noted to be 25% on angiogram. Discussed LifeVest today with pt, pt agreeable. Labs reviewed, chart reviewed    2.10.2024 doing well.  Tolerated Entresto.  Blood pressure better controlled.    Access site is normal.

## 2024-02-07 NOTE — ASSESSMENT & PLAN NOTE
BNP elevated  Echo with 25-30% EF  Cont OMT IV diuresis, coreg, lisinopril  Recommend R/LHC once compensated  Will plan on switching to entresto in future  Strict I/Os  Low Na diet    2/7/24  R/LHC planned for tomorrow  All risks, benefits and treatment alternatives explained all questions answered pt agreeable to proceed   NPO mn

## 2024-02-07 NOTE — CONSULTS
"                    Food & Nutrition Education    Diet Education: Heart Failure  Time Spent: 15 minutes.    Learners: Pt, spouse    Nutrition Education provided with handouts:  Healthy-Heart Nutrition Therapy, Fluid-Restricted Diet, Low-Sodium Nutrition Therapy ([email or web address])    Past Medical History:   Diagnosis Date    CAD (coronary artery disease)     Carotid artery dissection     CHF (congestive heart failure)     Gout, unspecified     HLD (hyperlipidemia)     HTN (hypertension)     Hyperparathyroidism, unspecified     JAMES (obstructive sleep apnea)     Prostate cancer     SAH (subarachnoid hemorrhage) 01/2023    Stroke 2020    Type 2 diabetes mellitus without complications        Comments:  Dietitian educated patient on low sodium diet and fluid restriction related to hospital diagnosis. Discussed the importance of limiting sodium to 2,000 mg per day and reading food labels to avoid further complications of CHF. Discussed using salt free seasonings (Mrs. H[employer] and [employer] "No Salt") and other herbs and spices in meals to enhance flavor without additional sodium. Discussed 1000 ml fluid restriction per MD and dietary sources of fluid. Dietitian recommended using a cup with measurements for fluids and to try to consume small sips spread throughout the day rather than a lot at one time.    The pt remained engaged throughout entire nutrition education. The pt states he has not followed fluid restrictions prior to current admission however, he states he understands the information presented and will begin accounting for all sources of fluids/sodium throughout the day. The pt and his spouse endorses that they will start using only salt alternatives and spices and herbs to flavor meals. The pt states he typically consume majority of fluids from Yeti cup however, he is not sure of exact measurements of cup. Discussed pouring fluids into measuring cup prior to pouring them into Yeti to fully account for " total amount of fluids. Pt and spouse states they have no further questions or concerns. Pt currently has a good appetite with 100% PO intake.      NFPE not performed, pt appears well nourished.    All questions and concerns answered.    Provided handout with dietitian's contact information.    *Please re-consult as needed.    Thank You!   Michel Murry, Registration Eligible, Provisional LDN

## 2024-02-07 NOTE — PROGRESS NOTES
[facility] - Telemetry ([facility] Medicine  Progress Note    Patient Name: Ron Matthew  MRN: [id number]  Patient Class: IP- Inpatient   Admission Date: 2/6/2024  Length of Stay: 1 days  Attending Physician: Heidi Hernandez,*  Primary Care Provider: No primary care provider on file.        Subjective:     Principal Problem:Acute on chronic combined systolic and diastolic congestive heart failure        HPI:  The patient is a 69 yo male with CAD, CHF-EF 45%, DM, HTN, HLD, Gout, JAMES, Prostate cancer, Stroke with right sided hemiparesis in 2020, Carotid dissection and SAH 1/2023 who presented to ED with SOB, BLE edema, and orthopnea x 5 days. Spouse reports he has not slept for several days due SOB. He did complain of chest pain yesterday but it resolved.     In the ED, BP mildly elevated, Afebrile, Oxygenation stable. +tachypnea. WBC 3.8, BNP 1238, Troponin 0.109. EKG-NSR with PACs, prolonged QT, no ischemia. CXR- Cardiomegaly. Pulmonary congestion.      Overview/Hospital Course:  patient is a 69 yo male with CAD, CHF, DM, HTN, HLD, Gout, JAMES, Prostate cancer, Stroke with right sided hemiparesis in 2020, Carotid dissection and SAH 1/2023 who presented to ED with SOB, BLE edema, and orthopnea x 5 days.      Admitted under Hospital Medicine for acute on chronic combined systolic and diastolic heart failure.    Echo as of today with ejection fraction 25-30% EF.  Plan to continue IV diuretics, Coreg, lisinopril, cardiology plans for possible right/left heart catheterization on 02/08/2024,     Interval History:   No acute events overnight   Stated improvement in dyspnea, able to lie flat without discomfort, however still with dyspnea on exertion    Still with bilateral lower extremity swelling.    On IV diuretics   Cardio planning for cardiac catheterization tomorrow, NPO since midnight.    Expressed allergic to statin, held.        Review of Systems      Constitutional:  Positive for fatigue.  Negative for appetite change, chills, diaphoresis and fever.   HENT:  Negative for congestion, nosebleeds, sore throat and trouble swallowing.    Eyes:  Negative for pain, discharge and visual disturbance.   Respiratory:  Positive for shortness of breath. Negative for apnea, cough, chest tightness, wheezing and stridor.    Cardiovascular:  Positive for leg swelling. Negative for palpitations.   Gastrointestinal:  Negative for abdominal distention, abdominal pain, blood in stool, constipation, diarrhea, nausea and vomiting.   Endocrine: Negative for cold intolerance and heat intolerance.   Genitourinary:  Negative for difficulty urinating, dysuria, flank pain, frequency and urgency.   Musculoskeletal:  Negative for arthralgias, back pain, joint swelling, myalgias, neck pain and neck stiffness.   Skin:  Negative for rash and wound.   Allergic/Immunologic: Negative for food allergies and immunocompromised state.   Neurological:  Positive for weakness. Negative for dizziness, seizures, syncope, facial asymmetry, light-headedness and headaches.   Hematological:  Negative for adenopathy.   Psychiatric/Behavioral:  Negative for agitation, behavioral problems and confusion. The patient is not nervous/anxious.        Objective:     Vital Signs (Most Recent):  Temp: 97.6 °F (36.4 °C) (02/07/24 0442)  Pulse: 81 (02/07/24 0754)  Resp: 18 (02/07/24 0442)  BP: 134/78 (02/07/24 0442)  SpO2: 97 % (02/07/24 0442) Vital Signs (24h Range):  Temp:  [96.2 °F (35.7 °C)-98.4 °F (36.9 °C)] 97.6 °F (36.4 °C)  Pulse:  [75-86] 81  Resp:  [16-18] 18  SpO2:  [95 %-100 %] 97 %  BP: (128-151)/(78-95) 134/78     Weight: 127.9 kg (281 lb 15.5 oz)  Body mass index is 37.2 kg/m².    Intake/Output Summary (Last 24 hours) at 2/7/2024 1137  Last data filed at 2/6/2024 1700  Gross per 24 hour   Intake 0 ml   Output 500 ml   Net -500 ml         Physical Exam      Constitutional:       General: He is not in acute distress.     Appearance: He is  well-developed. He is not diaphoretic.   HENT:      Head: Normocephalic and atraumatic.      Nose: Nose normal.   Eyes:      General: No scleral icterus.     Conjunctiva/sclera: Conjunctivae normal.   Cardiovascular:      Rate and Rhythm: Normal rate and regular rhythm.      Heart sounds: Normal heart sounds. No murmur heard.     No friction rub. No gallop.   Pulmonary:      Effort: Tachypnea and accessory muscle usage present. No respiratory distress.      Breath sounds: No stridor. Rales present. No wheezing.   Chest:      Chest wall: No tenderness.   Abdominal:      General: Bowel sounds are normal. There is no distension.      Palpations: Abdomen is soft.      Tenderness: There is no abdominal tenderness. There is no guarding or rebound.   Musculoskeletal:         General: No tenderness or deformity. Normal range of motion.      Cervical back: Normal range of motion and neck supple.      Right lower leg: Edema (+2) present.      Left lower leg: Edema (+2) present.   Skin:     General: Skin is warm and dry.      Coloration: Skin is not pale.      Findings: No erythema or rash.   Neurological:      Mental Status: He is alert and oriented to person, place, and time.      Cranial Nerves: No cranial nerve deficit.      Motor: No abnormal muscle tone.      Coordination: Coordination normal.      Deep Tendon Reflexes: Reflexes are normal and symmetric.   Psychiatric:         Behavior: Behavior normal.         Thought Content: Thought content normal.       Significant Labs: All pertinent labs within the past 24 hours have been reviewed.  CBC:   Recent Labs   Lab 02/06/24  0136 02/06/24  0654   WBC 3.83* 3.79*   HGB 15.2 15.5   HCT 45.6 47.4    162     CMP:   Recent Labs   Lab 02/06/24  0136 02/06/24  0654    143   K 4.3 4.1    109   CO2 20* 25   * 115*   BUN 13 13   CREATININE 0.9 0.9   CALCIUM 8.8 8.9   PROT 6.5 6.6   ALBUMIN 3.0* 3.1*   BILITOT 1.3* 1.7*   ALKPHOS 63 66   AST 24 22   ALT 34  35   ANIONGAP 11 9       Significant Imaging:   Imaging Results              X-Ray Chest AP Portable (Final result)  Result time 02/06/24 08:02:42      Final result by Shahid Louise MD (02/06/24 08:02:42)                   Impression:      As above.      Electronically signed by: Shahid Louise  Date:    02/06/2024  Time:    08:02               Narrative:    EXAMINATION:  XR CHEST AP PORTABLE    CLINICAL HISTORY:  Chest Pain;.    TECHNIQUE:  Single frontal portable view of the chest was performed.    COMPARISON:  None    FINDINGS:  Enlarged cardiomediastinal silhouette presumed related to cardiomegaly.  Pericardial effusion not excluded.  Pulmonary vascular congestion with mild interstitial edema.  Ill-defined opacification left lung base obscured by the heart border.  Left pleural effusion and left retrocardiac airspace disease not excluded.  No pneumothorax.                                       Assessment/Plan:      * Acute on chronic combined systolic and diastolic congestive heart failure  Patient is identified as having Combined Systolic and Diastolic heart failure that is Acute on chronic. CHF is currently uncontrolled due to Continued edema of extremities and JVD, Rales/crackles on pulmonary exam, and Pulmonary edema/pleural effusion on CXR. Latest ECHO performed and demonstrates- No results found for this or any previous visit.  . Continue Beta Blocker, ACE/ARB, and Furosemide and monitor clinical status closely. Monitor on telemetry. Patient is on CHF pathway.  Monitor strict Is&Os and daily weights.  Place on fluid restriction of 1.5 L. Cardiology has been consulted. Continue to stress to patient importance of self efficacy and  on diet for CHF. Last BNP reviewed- and noted below   Recent Labs   Lab 02/06/24  0136   BNP 1,238*     IV Lasix  Echo   Cardiology consult     Stroke  Hx ischemic stroke with right sided weakness and visual deficits 202- mostly resolved   SAH 1/2023  Cont ASA, Plavix,  "Statin     CAD (coronary artery disease)  Patient with known CAD s/p  NONE , which is uncontrolled Will continue  BB, ASA, Plavix, and Statin and monitor for S/Sx of angina/ACS. Continue to monitor on telemetry.     Hypertension  Chronic, uncontrolled. Latest blood pressure and vitals reviewed-     Temp:  [97.6 °F (36.4 °C)]   Pulse:  [80-91]   Resp:  [17-26]   BP: (135-163)/()   SpO2:  [95 %-100 %] .   Home meds for hypertension were reviewed and noted below.   Hypertension Medications               amLODIPine (NORVASC) 2.5 MG tablet Take 1 tablet by mouth once daily.    carvediloL (COREG) 6.25 MG tablet Take 1 tablet by mouth 2 (two) times daily.    lisinopriL (PRINIVIL,ZESTRIL) 40 MG tablet Take 1 tablet by mouth once daily.            While in the hospital, will manage blood pressure as follows; Continue home antihypertensive regimen    Will utilize p.r.n. blood pressure medication only if patient's blood pressure greater than 140/90 and he develops symptoms such as worsening chest pain or shortness of breath.    Type 2 diabetes mellitus, without long-term current use of insulin  Patient's FSGs are uncontrolled due to hyperglycemia on current medication regimen.  Last A1c reviewed-   Lab Results   Component Value Date    HGBA1C 6.8 (H) 02/02/2023     Most recent fingerstick glucose reviewed- No results for input(s): "POCTGLUCOSE" in the last 24 hours.  Current correctional scale  Low  Maintain anti-hyperglycemic dose as follows-   Antihyperglycemics (From admission, onward)      None          Hold Oral hypoglycemics while patient is in the hospital.       Elevated troponin  Serial troponin   Cont ASA, BB, Plavix, Statin, ACE  Echo   Consult cardiology      Carotid stenosis, asymptomatic, bilateral  Followed closely by vascular surgery  Internal carotid artery dissection: right ICA dissected and is 100% occluded nothing to do surgically to correct this. No major deficits since the angiogram. We will follow " his left carotid disease with yearly duplex.         VTE Risk Mitigation (From admission, onward)           Ordered     heparin (porcine) injection 5,000 Units  Every 8 hours         02/06/24 0638     IP VTE HIGH RISK PATIENT  Once         02/06/24 0638     Place sequential compression device  Until discontinued         02/06/24 0638                    Discharge Planning   ANNE-MARIE:      Code Status: Full Code   Is the patient medically ready for discharge?:     Reason for patient still in hospital (select all that apply): Patient trending condition, Consult recommendations, and Pending disposition                     Heidi Hernandez MD  Department of Hospital Medicine   '[facility] - Telemetry ([facility])

## 2024-02-07 NOTE — PROGRESS NOTES
[facility] - Telemetry ([facility])  Cardiology  Progress Note    Patient Name: Ron Matthew  MRN: [id number]  Admission Date: 2/6/2024  Hospital Length of Stay: 1 days  Code Status: Full Code   Attending Physician: Heidi Hernandez,*   Primary Care Physician: No primary care provider on file.  Expected Discharge Date:   Principal Problem:Acute on chronic combined systolic and diastolic congestive heart failure    Subjective:   HPI:   The patient is a 69 yo male with CAD, CHF-EF 45%, DM, HTN, HLD, Gout, JAMES, Prostate cancer, Stroke with right sided hemiparesis in 2020, Carotid dissection and SAH 1/2023 who presented to ED with SOB, BLE edema, and orthopnea x 5 days. Spouse reports he has not slept for several days due SOB. He did complain of chest pain yesterday but it resolved.   In the ED, BP mildly elevated, Afebrile, Oxygenation stable. +tachypnea. WBC 3.8, BNP 1238, Troponin 0.109. EKG-NSR with PACs, prolonged QT, no ischemia. CXR- Cardiomegaly. Pulmonary congestion  Cardiology consulted to assist with management.Pt seen and examined today in ED, c/o SOB, denies any CP at this time. Reports SAH last year on plavix. Labs reviewed, Crt 0.9, troponin flat, BNP elevated 1238 Echo with EF 25-30%, PA 47  Hospital Course:   2/7/24 Pt seen and examined today, feels ok reports his SOB is improving, denies any CP at this time. LE edema improving. Labs reviewed, chart reviewed        Review of Systems   Constitutional: Negative.   HENT: Negative.     Eyes: Negative.    Cardiovascular:  Positive for leg swelling.   Respiratory:  Positive for shortness of breath.    Skin: Negative.    Musculoskeletal: Negative.    Gastrointestinal: Negative.    Genitourinary: Negative.    Neurological: Negative.    Psychiatric/Behavioral: Negative.       Objective:     Vital Signs (Most Recent):  Temp: 97.6 °F (36.4 °C) (02/07/24 0442)  Pulse: 81 (02/07/24 0754)  Resp: 18 (02/07/24 0442)  BP: 134/78 (02/07/24 0442)  SpO2: 97 %  (02/07/24 0442) Vital Signs (24h Range):  Temp:  [97.6 °F (36.4 °C)-98.4 °F (36.9 °C)] 97.6 °F (36.4 °C)  Pulse:  [75-85] 81  Resp:  [16-18] 18  SpO2:  [95 %-97 %] 97 %  BP: (134-151)/(78-95) 134/78     Weight: 127.9 kg (281 lb 15.5 oz)  Body mass index is 37.2 kg/m².     SpO2: 97 %         Intake/Output Summary (Last 24 hours) at 2/7/2024 1320  Last data filed at 2/6/2024 1700  Gross per 24 hour   Intake 0 ml   Output 500 ml   Net -500 ml       Lines/Drains/Airways       Peripheral Intravenous Line  Duration                  Peripheral IV - Single Lumen 02/06/24 2340 20 G Posterior;Right Hand <1 day                       Physical Exam  Vitals and nursing note reviewed.   Constitutional:       Appearance: He is obese.   HENT:      Head: Normocephalic and atraumatic.   Eyes:      General:         Right eye: No discharge.         Left eye: No discharge.      Pupils: Pupils are equal, round, and reactive to light.   Cardiovascular:      Rate and Rhythm: Normal rate and regular rhythm.      Heart sounds: S1 normal and S2 normal. No murmur heard.     No friction rub.   Pulmonary:      Effort: Pulmonary effort is normal. No respiratory distress.      Breath sounds: Rales present.   Abdominal:      Palpations: Abdomen is soft.      Tenderness: There is no abdominal tenderness.   Musculoskeletal:      Cervical back: Neck supple.      Right lower leg: Edema present.      Left lower leg: Edema present.   Skin:     General: Skin is warm and dry.   Neurological:      General: No focal deficit present.      Mental Status: He is alert and oriented to person, place, and time.   Psychiatric:         Mood and Affect: Mood normal.         Behavior: Behavior normal.         Thought Content: Thought content normal.            Significant Labs: BMP:   Recent Labs   Lab 02/06/24  0136 02/06/24  0654   * 115*    143   K 4.3 4.1    109   CO2 20* 25   BUN 13 13   CREATININE 0.9 0.9   CALCIUM 8.8 8.9   MG  --  2.0   , CMP  "  Recent Labs   Lab 02/06/24  0136 02/06/24  0654    143   K 4.3 4.1    109   CO2 20* 25   * 115*   BUN 13 13   CREATININE 0.9 0.9   CALCIUM 8.8 8.9   PROT 6.5 6.6   ALBUMIN 3.0* 3.1*   BILITOT 1.3* 1.7*   ALKPHOS 63 66   AST 24 22   ALT 34 35   ANIONGAP 11 9   , CBC   Recent Labs   Lab 02/06/24  0136 02/06/24  0654   WBC 3.83* 3.79*   HGB 15.2 15.5   HCT 45.6 47.4    162   , INR No results for input(s): "INR", "PROTIME" in the last 48 hours., Lipid Panel No results for input(s): "CHOL", "HDL", "LDLCALC", "TRIG", "CHOLHDL" in the last 48 hours., Troponin   Recent Labs   Lab 02/06/24  0654 02/06/24  1334 02/06/24  1812   TROPONINI 0.089* 0.073* 0.075*   , and All pertinent lab results from the last 24 hours have been reviewed.    Significant Imaging: Cardiac Cath: reviewed, Echocardiogram: Transthoracic echo (TTE) complete (Cupid Only):   Results for orders placed or performed during the hospital encounter of 02/06/24   Echo   Result Value Ref Range    BSA 2.69 m2    Greene's Biplane MOD Ejection Fraction 31 %    LVOT stroke volume 45.85 cm3    LVIDd 6.07 (A) 3.5 - 6.0 cm    LV Systolic Volume 150.23 mL    LV Systolic Volume Index 58.0 mL/m2    LVIDs 5.55 (A) 2.1 - 4.0 cm    LV Diastolic Volume 184.97 mL    LV Diastolic Volume Index 71.42 mL/m2    IVS 1.52 (A) 0.6 - 1.1 cm    LVOT diameter 2.08 cm    LVOT area 3.4 cm2    FS 9 (A) 28 - 44 %    Left Ventricle Relative Wall Thickness 0.50 cm    Posterior Wall 1.52 (A) 0.6 - 1.1 cm    LV mass 443.56 g    LV Mass Index 171 g/m2    MV Peak E Simon 0.77 m/s    TDI LATERAL 0.09 m/s    TDI SEPTAL 0.07 m/s    E/E' ratio 9.63 m/s    MV Peak A Simon 0.31 m/s    TR Max Simon 2.82 m/s    E/A ratio 2.48     IVRT 60.89 msec    E wave deceleration time 119.06 msec    LV SEPTAL E/E' RATIO 11.00 m/s    LV LATERAL E/E' RATIO 8.56 m/s    LVOT peak simon 0.73 m/s    Left Ventricular Outflow Tract Mean Velocity 0.63 cm/s    Left Ventricular Outflow Tract Mean Gradient " 1.63 mmHg    RVDD 2.96 cm    RVOT peak VTI 7.1 cm    TAPSE 1.64 cm    LA size 4.93 cm    Left Atrium Minor Axis 5.85 cm    Left Atrium Major Axis 6.08 cm    RA Major Axis 5.15 cm    AV regurgitation pressure 1/2 time [id number] ms    AR Max Simon 1.88 m/s    AV mean gradient 5 mmHg    AV peak gradient 8 mmHg    Ao peak simon 1.37 m/s    Ao VTI 25.10 cm    LVOT peak VTI 13.50 cm    AV valve area 1.83 cm²    AV Velocity Ratio 0.53     AV index (prosthetic) 0.54     GINGER by Velocity Ratio 1.81 cm²    Mr max simon 3.61 m/s    MV stenosis pressure 1/2 time 34.53 ms    MV valve area p 1/2 method 6.37 cm2    TV mean gradient 32 mmHg    Triscuspid Valve Regurgitation Peak Gradient 32 mmHg    PV mean gradient 1 mmHg    RVOT peak simon 0.49 m/s    Ao root annulus 3.49 cm    STJ 3.66 cm    Ascending aorta 3.62 cm    IVC diameter 2.65 cm    Mean e' 0.08 m/s    ZLVIDS -5.06     ZLVIDD -10.82     LA Volume Index 42.4 mL/m2    LA volume 109.94 cm3    LA WIDTH 4.4 cm    RA Width 3.3 cm    TV resting pulmonary artery pressure 47 mmHg    RV TB RVSP 18 mmHg    Est. RA pres 15 mmHg    Narrative      Left Ventricle: The left ventricle is moderately dilated. Normal wall   thickness. Severe global hypokinesis present. There is severely reduced   systolic function with a visually estimated ejection fraction of 25 - 30%.   Grade II diastolic dysfunction.    Right Ventricle: Normal right ventricular cavity size. Wall thickness   is normal. Right ventricle wall motion  is normal. Systolic function is   mildly reduced.    Left Atrium: Left atrium is moderately dilated.    Right Atrium: Right atrium is mildly dilated.    Aortic Valve: There is mild aortic regurgitation.    Mitral Valve: There is mild regurgitation.    Tricuspid Valve: There is mild regurgitation.    Pulmonary Artery: The estimated pulmonary artery systolic pressure is   47 mmHg.    IVC/SVC: Elevated venous pressure at 15 mmHg.     , EKG: reviewed, Stress Test: reviewed, and  X-Ray: CXR: X-Ray Chest 1 View (CXR): No results found for this visit on 02/06/24.  Assessment and Plan:       * Acute on chronic combined systolic and diastolic congestive heart failure  BNP elevated  Echo with 25-30% EF  Cont OMT IV diuresis, coreg, lisinopril  Recommend R/LHC once compensated  Will plan on switching to entresto in future  Strict I/Os  Low Na diet    2/7/24  R/LHC planned for tomorrow  All risks, benefits and treatment alternatives explained all questions answered pt agreeable to proceed   NPO mn      Stroke  H/o SAH followed by CVT    CAD (coronary artery disease)  Cont asa, statin, BB, plavix  R/LHC once compensated    2/7/24  R/LHC planned for tomorrow    Hypertension  Titrate medications  Will plan on switching to entresto possibly OP    Elevated troponin  Troponin flat, likely demand  Echo with low EF  Recommend R/LHC once compensated  Cont ASA, statin, BB    2/7/24  R/LHC tomorrow  Npo mn    Carotid stenosis, asymptomatic, bilateral  Followed by CVT  Cont home regimen        VTE Risk Mitigation (From admission, onward)           Ordered     heparin (porcine) injection 5,000 Units  Every 8 hours         02/06/24 0638     IP VTE HIGH RISK PATIENT  Once         02/06/24 0638     Place sequential compression device  Until discontinued         02/06/24 0638                    Zhane Ta, NP  Cardiology  [facility] - Telemetry ([facility])

## 2024-02-08 ENCOUNTER — DOCUMENTATION ONLY (OUTPATIENT)
Dept: CARDIOLOGY | Facility: CLINIC | Age: 69
End: 2024-02-08
Payer: MEDICARE

## 2024-02-08 LAB
ALLENS TEST: ABNORMAL
CATH EF QUANTITATIVE: 25 %
DELSYS: ABNORMAL
FIO2: 21
HCO3 UR-SCNC: 32.9 MMOL/L (ref 24–28)
MODE: ABNORMAL
PCO2 BLDA: 56.6 MMHG (ref 35–45)
PH SMN: 7.37 [PH] (ref 7.35–7.45)
PO2 BLDA: 34 MMHG (ref 40–60)
POC BE: 8 MMOL/L
POC SATURATED O2: 61 % (ref 95–100)
POCT GLUCOSE: 113 MG/DL (ref 70–110)
POCT GLUCOSE: 153 MG/DL (ref 70–110)
POCT GLUCOSE: 95 MG/DL (ref 70–110)
SAMPLE: ABNORMAL
SITE: ABNORMAL

## 2024-02-08 PROCEDURE — 4A023N8 MEASUREMENT OF CARDIAC SAMPLING AND PRESSURE, BILATERAL, PERCUTANEOUS APPROACH: ICD-10-PCS | Performed by: INTERNAL MEDICINE

## 2024-02-08 PROCEDURE — 99152 MOD SED SAME PHYS/QHP 5/>YRS: CPT | Mod: ,,, | Performed by: INTERNAL MEDICINE

## 2024-02-08 PROCEDURE — C1751 CATH, INF, PER/CENT/MIDLINE: HCPCS | Performed by: INTERNAL MEDICINE

## 2024-02-08 PROCEDURE — B2151ZZ FLUOROSCOPY OF LEFT HEART USING LOW OSMOLAR CONTRAST: ICD-10-PCS | Performed by: INTERNAL MEDICINE

## 2024-02-08 PROCEDURE — 99900035 HC TECH TIME PER 15 MIN (STAT)

## 2024-02-08 PROCEDURE — C1769 GUIDE WIRE: HCPCS | Performed by: INTERNAL MEDICINE

## 2024-02-08 PROCEDURE — 25000003 PHARM REV CODE 250: Performed by: NURSE PRACTITIONER

## 2024-02-08 PROCEDURE — 99152 MOD SED SAME PHYS/QHP 5/>YRS: CPT | Performed by: INTERNAL MEDICINE

## 2024-02-08 PROCEDURE — 63600175 PHARM REV CODE 636 W HCPCS: Mod: JZ,JG | Performed by: INTERNAL MEDICINE

## 2024-02-08 PROCEDURE — C1894 INTRO/SHEATH, NON-LASER: HCPCS | Performed by: INTERNAL MEDICINE

## 2024-02-08 PROCEDURE — 99233 SBSQ HOSP IP/OBS HIGH 50: CPT | Mod: ,,, | Performed by: INTERNAL MEDICINE

## 2024-02-08 PROCEDURE — 21400001 HC TELEMETRY ROOM

## 2024-02-08 PROCEDURE — 25000003 PHARM REV CODE 250: Performed by: INTERNAL MEDICINE

## 2024-02-08 PROCEDURE — 99153 MOD SED SAME PHYS/QHP EA: CPT | Performed by: INTERNAL MEDICINE

## 2024-02-08 PROCEDURE — 93460 R&L HRT ART/VENTRICLE ANGIO: CPT | Performed by: INTERNAL MEDICINE

## 2024-02-08 PROCEDURE — 93460 R&L HRT ART/VENTRICLE ANGIO: CPT | Mod: 26,,, | Performed by: INTERNAL MEDICINE

## 2024-02-08 PROCEDURE — 25500020 PHARM REV CODE 255: Performed by: INTERNAL MEDICINE

## 2024-02-08 PROCEDURE — 63600175 PHARM REV CODE 636 W HCPCS: Performed by: NURSE PRACTITIONER

## 2024-02-08 PROCEDURE — 82803 BLOOD GASES ANY COMBINATION: CPT

## 2024-02-08 PROCEDURE — B2111ZZ FLUOROSCOPY OF MULTIPLE CORONARY ARTERIES USING LOW OSMOLAR CONTRAST: ICD-10-PCS | Performed by: INTERNAL MEDICINE

## 2024-02-08 RX ORDER — CARVEDILOL 12.5 MG/1
12.5 TABLET ORAL 2 TIMES DAILY
Status: DISCONTINUED | OUTPATIENT
Start: 2024-02-09 | End: 2024-02-10 | Stop reason: HOSPADM

## 2024-02-08 RX ORDER — ACETAMINOPHEN 325 MG/1
650 TABLET ORAL EVERY 4 HOURS PRN
Status: DISCONTINUED | OUTPATIENT
Start: 2024-02-08 | End: 2024-02-10 | Stop reason: HOSPADM

## 2024-02-08 RX ORDER — ONDANSETRON 8 MG/1
8 TABLET, ORALLY DISINTEGRATING ORAL EVERY 8 HOURS PRN
Status: DISCONTINUED | OUTPATIENT
Start: 2024-02-08 | End: 2024-02-10 | Stop reason: HOSPADM

## 2024-02-08 RX ORDER — MIDAZOLAM HYDROCHLORIDE 1 MG/ML
INJECTION, SOLUTION INTRAMUSCULAR; INTRAVENOUS
Status: DISCONTINUED | OUTPATIENT
Start: 2024-02-08 | End: 2024-02-08 | Stop reason: HOSPADM

## 2024-02-08 RX ORDER — METOLAZONE 5 MG/1
5 TABLET ORAL DAILY
Status: DISCONTINUED | OUTPATIENT
Start: 2024-02-08 | End: 2024-02-10

## 2024-02-08 RX ORDER — SODIUM CHLORIDE 9 MG/ML
INJECTION, SOLUTION INTRAVENOUS CONTINUOUS
Status: ACTIVE | OUTPATIENT
Start: 2024-02-08 | End: 2024-02-08

## 2024-02-08 RX ORDER — LIDOCAINE HYDROCHLORIDE 20 MG/ML
INJECTION, SOLUTION EPIDURAL; INFILTRATION; INTRACAUDAL; PERINEURAL
Status: DISCONTINUED | OUTPATIENT
Start: 2024-02-08 | End: 2024-02-08 | Stop reason: HOSPADM

## 2024-02-08 RX ORDER — NITROGLYCERIN 5 MG/ML
INJECTION, SOLUTION INTRAVENOUS
Status: DISCONTINUED | OUTPATIENT
Start: 2024-02-08 | End: 2024-02-08 | Stop reason: HOSPADM

## 2024-02-08 RX ORDER — FENTANYL CITRATE 50 UG/ML
INJECTION, SOLUTION INTRAMUSCULAR; INTRAVENOUS
Status: DISCONTINUED | OUTPATIENT
Start: 2024-02-08 | End: 2024-02-08 | Stop reason: HOSPADM

## 2024-02-08 RX ORDER — SPIRONOLACTONE 25 MG/1
25 TABLET ORAL DAILY
Status: DISCONTINUED | OUTPATIENT
Start: 2024-02-08 | End: 2024-02-08

## 2024-02-08 RX ORDER — HEPARIN SODIUM 1000 [USP'U]/ML
INJECTION INTRAVENOUS; SUBCUTANEOUS
Status: DISCONTINUED | OUTPATIENT
Start: 2024-02-08 | End: 2024-02-08 | Stop reason: HOSPADM

## 2024-02-08 RX ADMIN — ASPIRIN 81 MG CHEWABLE TABLET 81 MG: 81 TABLET CHEWABLE at 08:02

## 2024-02-08 RX ADMIN — FUROSEMIDE 40 MG: 10 INJECTION, SOLUTION INTRAMUSCULAR; INTRAVENOUS at 08:02

## 2024-02-08 RX ADMIN — CARVEDILOL 6.25 MG: 6.25 TABLET, FILM COATED ORAL at 08:02

## 2024-02-08 RX ADMIN — HEPARIN SODIUM 5000 UNITS: 5000 INJECTION INTRAVENOUS; SUBCUTANEOUS at 09:02

## 2024-02-08 RX ADMIN — SENNOSIDES AND DOCUSATE SODIUM 1 TABLET: 8.6; 5 TABLET ORAL at 08:02

## 2024-02-08 RX ADMIN — SENNOSIDES AND DOCUSATE SODIUM 1 TABLET: 8.6; 5 TABLET ORAL at 09:02

## 2024-02-08 RX ADMIN — METOLAZONE 5 MG: 5 TABLET ORAL at 09:02

## 2024-02-08 RX ADMIN — CLOPIDOGREL BISULFATE 75 MG: 75 TABLET ORAL at 08:02

## 2024-02-08 RX ADMIN — CARVEDILOL 6.25 MG: 6.25 TABLET, FILM COATED ORAL at 09:02

## 2024-02-08 RX ADMIN — LISINOPRIL 40 MG: 20 TABLET ORAL at 08:02

## 2024-02-08 RX ADMIN — AMLODIPINE BESYLATE 2.5 MG: 2.5 TABLET ORAL at 08:02

## 2024-02-08 RX ADMIN — LEVOTHYROXINE SODIUM 25 MCG: 0.03 TABLET ORAL at 05:02

## 2024-02-08 RX ADMIN — FUROSEMIDE 40 MG: 10 INJECTION, SOLUTION INTRAMUSCULAR; INTRAVENOUS at 09:02

## 2024-02-08 RX ADMIN — HEPARIN SODIUM 5000 UNITS: 5000 INJECTION INTRAVENOUS; SUBCUTANEOUS at 05:02

## 2024-02-08 NOTE — PROGRESS NOTES
[facility] - Telemetry ([facility])  [facility] Medicine  Progress Note    Patient Name: Ron Matthew  MRN: [id number]  Patient Class: IP- Inpatient   Admission Date: 2/6/2024  Length of Stay: 2 days  Attending Physician: Heidi Hernandez,*  Primary Care Provider: Administration, Cheri        Subjective:     Principal Problem:Acute on chronic combined systolic and diastolic congestive heart failure        HPI:  The patient is a 67 yo male with CAD, CHF-EF 45%, DM, HTN, HLD, Gout, JAMES, Prostate cancer, Stroke with right sided hemiparesis in 2020, Carotid dissection and SAH 1/2023 who presented to ED with SOB, BLE edema, and orthopnea x 5 days. Spouse reports he has not slept for several days due SOB. He did complain of chest pain yesterday but it resolved.     In the ED, BP mildly elevated, Afebrile, Oxygenation stable. +tachypnea. WBC 3.8, BNP 1238, Troponin 0.109. EKG-NSR with PACs, prolonged QT, no ischemia. CXR- Cardiomegaly. Pulmonary congestion.      Overview/Hospital Course:  patient is a 67 yo male with CAD, CHF, DM, HTN, HLD, Gout, JAMES, Prostate cancer, Stroke with right sided hemiparesis in 2020, Carotid dissection and SAH 1/2023 who presented to ED with SOB, BLE edema, and orthopnea x 5 days.      Admitted under Hospital Medicine for acute on chronic combined systolic and diastolic heart failure.    Echo as of today with ejection fraction 25-30% EF.  Plan to continue IV diuretics, Coreg, lisinopril, cardiology plans for possible right/left heart catheterization on 02/08/2024,         Interval History:     Scheduled for cardiac catheterization today, NPO since midnight, denied acute issues overnight.  Alert and oriented x3, stated improvement in dyspnea, still bilateral lower extremity swelling, gradually improving.        Review of Systems        Constitutional:  Positive for fatigue. Negative for appetite change, chills, diaphoresis and fever.   HENT:  Negative for congestion, nosebleeds, sore  throat and trouble swallowing.    Eyes:  Negative for pain, discharge and visual disturbance.   Respiratory:  Positive for shortness of breath. Negative for apnea, cough, chest tightness, wheezing and stridor.    Cardiovascular:  Positive for leg swelling. Negative for palpitations.   Gastrointestinal:  Negative for abdominal distention, abdominal pain, blood in stool, constipation, diarrhea, nausea and vomiting.   Endocrine: Negative for cold intolerance and heat intolerance.   Genitourinary:  Negative for difficulty urinating, dysuria, flank pain, frequency and urgency.   Musculoskeletal:  Negative for arthralgias, back pain, joint swelling, myalgias, neck pain and neck stiffness.   Skin:  Negative for rash and wound.   Allergic/Immunologic: Negative for food allergies and immunocompromised state.   Neurological:  Positive for weakness. Negative for dizziness, seizures, syncope, facial asymmetry, light-headedness and headaches.   Hematological:  Negative for adenopathy.   Psychiatric/Behavioral:  Negative for agitation, behavioral problems and confusion. The patient is not nervous/anxious.      Objective:     Vital Signs (Most Recent):  Temp: 98.1 °F (36.7 °C) (02/08/24 0833)  Pulse: 87 (02/08/24 0833)  Resp: 18 (02/08/24 0833)  BP: (!) 121/90 (02/08/24 0833)  SpO2: (!) 94 % (02/08/24 0833) Vital Signs (24h Range):  Temp:  [97.6 °F (36.4 °C)-98.2 °F (36.8 °C)] 98.1 °F (36.7 °C)  Pulse:  [84-99] 87  Resp:  [16-18] 18  SpO2:  [91 %-94 %] 94 %  BP: (121-134)/(80-90) 121/90     Weight: 123.3 kg (271 lb 13.2 oz)  Body mass index is 35.86 kg/m².  No intake or output data in the 24 hours ending 02/08/24 1133      Physical Exam        Constitutional:       General: He is not in acute distress.     Appearance: He is well-developed. He is not diaphoretic.   HENT:      Head: Normocephalic and atraumatic.      Nose: Nose normal.   Eyes:      General: No scleral icterus.     Conjunctiva/sclera: Conjunctivae normal.  "  Cardiovascular:      Rate and Rhythm: Normal rate and regular rhythm.      Heart sounds: Normal heart sounds. No murmur heard.     No friction rub. No gallop.   Pulmonary:      Effort: Tachypnea and accessory muscle usage present. No respiratory distress.      Breath sounds: No stridor. Rales present. No wheezing.   Chest:      Chest wall: No tenderness.   Abdominal:      General: Bowel sounds are normal. There is no distension.      Palpations: Abdomen is soft.      Tenderness: There is no abdominal tenderness. There is no guarding or rebound.   Musculoskeletal:         General: No tenderness or deformity. Normal range of motion.      Cervical back: Normal range of motion and neck supple.      Right lower leg: Edema (+2) present.      Left lower leg: Edema (+2) present.   Skin:     General: Skin is warm and dry.      Coloration: Skin is not pale.      Findings: No erythema or rash.   Neurological:      Mental Status: He is alert and oriented to person, place, and time.      Cranial Nerves: No cranial nerve deficit.      Motor: No abnormal muscle tone.      Coordination: Coordination normal.      Deep Tendon Reflexes: Reflexes are normal and symmetric.   Psychiatric:         Behavior: Behavior normal.         Thought Content: Thought content normal.        Significant Labs: All pertinent labs within the past 24 hours have been reviewed.  CBC: No results for input(s): "WBC", "HGB", "HCT", "PLT" in the last 48 hours.  CMP: No results for input(s): "NA", "K", "CL", "CO2", "GLU", "BUN", "CREATININE", "CALCIUM", "PROT", "ALBUMIN", "BILITOT", "ALKPHOS", "AST", "ALT", "ANIONGAP", "EGFRNONAA" in the last 48 hours.    Invalid input(s): "ESTGF[place]"    Significant Imaging:     Imaging Results              X-Ray Chest AP Portable (Final result)  Result time 02/06/24 08:02:42      Final result by Shahid Louise MD (02/06/24 08:02:42)                   Impression:      As above.      Electronically signed by: Shahid " Dani  Date:    02/06/2024  Time:    08:02               Narrative:    EXAMINATION:  XR CHEST AP PORTABLE    CLINICAL HISTORY:  Chest Pain;.    TECHNIQUE:  Single frontal portable view of the chest was performed.    COMPARISON:  None    FINDINGS:  Enlarged cardiomediastinal silhouette presumed related to cardiomegaly.  Pericardial effusion not excluded.  Pulmonary vascular congestion with mild interstitial edema.  Ill-defined opacification left lung base obscured by the heart border.  Left pleural effusion and left retrocardiac airspace disease not excluded.  No pneumothorax.                                       Assessment/Plan:      * Acute on chronic combined systolic and diastolic congestive heart failure  Patient is identified as having Combined Systolic and Diastolic heart failure that is Acute on chronic. CHF is currently uncontrolled due to Continued edema of extremities and JVD, Rales/crackles on pulmonary exam, and Pulmonary edema/pleural effusion on CXR. Latest ECHO performed and demonstrates- No results found for this or any previous visit.  . Continue Beta Blocker, ACE/ARB, and Furosemide and monitor clinical status closely. Monitor on telemetry. Patient is on CHF pathway.  Monitor strict Is&Os and daily weights.  Place on fluid restriction of 1.5 L. Cardiology has been consulted. Continue to stress to patient importance of self efficacy and  on diet for CHF. Last BNP reviewed- and noted below   Recent Labs   Lab 02/06/24  0136   BNP 1,238*     IV Lasix  Echo   Cardiology consult     Stroke  Hx ischemic stroke with right sided weakness and visual deficits 202- mostly resolved   SAH 1/2023  Cont ASA, Plavix, Statin     CAD (coronary artery disease)  Patient with known CAD s/p  NONE , which is uncontrolled Will continue  BB, ASA, Plavix, and Statin and monitor for S/Sx of angina/ACS. Continue to monitor on telemetry.     Hypertension  Chronic, uncontrolled. Latest blood pressure and vitals  "reviewed-     Temp:  [97.6 °F (36.4 °C)]   Pulse:  [80-91]   Resp:  [17-26]   BP: (135-163)/()   SpO2:  [95 %-100 %] .   Home meds for hypertension were reviewed and noted below.   Hypertension Medications               amLODIPine (NORVASC) 2.5 MG tablet Take 1 tablet by mouth once daily.    carvediloL (COREG) 6.25 MG tablet Take 1 tablet by mouth 2 (two) times daily.    lisinopriL (PRINIVIL,ZESTRIL) 40 MG tablet Take 1 tablet by mouth once daily.            While in the hospital, will manage blood pressure as follows; Continue home antihypertensive regimen    Will utilize p.r.n. blood pressure medication only if patient's blood pressure greater than 140/90 and he develops symptoms such as worsening chest pain or shortness of breath.    Type 2 diabetes mellitus, without long-term current use of insulin  Patient's FSGs are uncontrolled due to hyperglycemia on current medication regimen.  Last A1c reviewed-   Lab Results   Component Value Date    HGBA1C 6.8 (H) 02/02/2023     Most recent fingerstick glucose reviewed- No results for input(s): "POCTGLUCOSE" in the last 24 hours.  Current correctional scale  Low  Maintain anti-hyperglycemic dose as follows-   Antihyperglycemics (From admission, onward)      None          Hold Oral hypoglycemics while patient is in the hospital.       Elevated troponin  Serial troponin   Cont ASA, BB, Plavix, Statin, ACE  Echo   Consult cardiology      Carotid stenosis, asymptomatic, bilateral  Followed closely by vascular surgery  Internal carotid artery dissection: right ICA dissected and is 100% occluded nothing to do surgically to correct this. No major deficits since the angiogram. We will follow his left carotid disease with yearly duplex.         VTE Risk Mitigation (From admission, onward)           Ordered     heparin (porcine) injection 5,000 Units  Every 8 hours         02/06/24 0638     IP VTE HIGH RISK PATIENT  Once         02/06/24 0638     Place sequential compression " device  Until discontinued         02/06/24 0638                    Discharge Planning   ANNE-MARIE:      Code Status: Full Code   Is the patient medically ready for discharge?:     Reason for patient still in hospital (select all that apply): Patient trending condition, Consult recommendations, and Pending disposition  Discharge Plan A: Home with family                  Heidi Hernandez MD  Department of Hospital Medicine   [facility] ([facility])

## 2024-02-08 NOTE — ASSESSMENT & PLAN NOTE
BNP elevated  Echo with 25-30% EF  Cont OMT IV diuresis, coreg, lisinopril  Recommend R/LHC once compensated  Will plan on switching to entresto in future  Strict I/Os  Low Na diet    2/7/24  R/LHC planned for tomorrow  All risks, benefits and treatment alternatives explained all questions answered pt agreeable to proceed   NPO mn    2.8.2024  Appears to be euvolemic today.    No prior ischemic workup.    Going for right and left heart catheterization today for severe cardiomyopathy.

## 2024-02-08 NOTE — INTERVAL H&P NOTE
The patient has been examined and the H&P has been reviewed:    I concur with the findings and no changes have occurred since H&P was written.    Anesthesia/Surgery risks, benefits and alternative options discussed and understood by patient/family.          Active Hospital Problems    Diagnosis  POA    *Acute on chronic combined systolic and diastolic congestive heart failure [I50.43]  Yes    Carotid stenosis, asymptomatic, bilateral [I65.23]  Yes    Elevated troponin [R79.89]  Yes    Type 2 diabetes mellitus, without long-term current use of insulin [E11.9]  Yes    Hypertension [I10]  Yes    CAD (coronary artery disease) [I25.10]  Yes    Stroke [I63.9]  Yes      Resolved Hospital Problems   No resolved problems to display.

## 2024-02-08 NOTE — BRIEF OP NOTE
[facility] - Cath Lab ([facility])  Brief Operative Note  Cardiology    SUMMARY     Surgery Date: 2/8/2024     Surgeon(s) and Role:     * Genie Claros MD - Primary    Assisting Surgeon: None    Pre-op Diagnosis:  Elevated troponin [R79.89]    Post-op Diagnosis: Post-Op Diagnosis Codes:     * Elevated troponin [R79.89]    Procedure Performed:     Procedure(s) (LRB):  CATHETERIZATION, HEART, BOTH LEFT AND RIGHT (N/A)    Technical Procedures Used:     Operative Findings:   Results for orders placed during the hospital encounter of 02/06/24    Cardiac catheterization    Conclusion    The ejection fraction was calculated to be 25%.    The pre-procedure left ventricular end diastolic pressure was 29.    The post-procedure left ventricular end diastolic pressure was 33.    The estimated blood loss was none.    There was non-obstructive coronary artery disease..    The filling pressures on the right and left were moderately elevated. Pulmonary hypertension was moderate.    Non ischemic dilated cardiomyopathy    The procedure log was documented by Documenter: Nahomi Reddy RN and verified by Genie Claros MD.    Date: 2/8/2024  Time: 5:21 PM      Estimated Blood Loss: * No values recorded between 2/8/2024  4:47 PM and 2/8/2024  5:17 PM *         Specimens:   Specimen (24h ago, onward)      None

## 2024-02-08 NOTE — PLAN OF CARE
[facility] - Telemetry (Hospital)  Initial Discharge Assessment       Primary Care Provider: No primary care provider on file.    Admission Diagnosis: Elevated troponin [R79.89]  NSTEMI (non-ST elevated myocardial infarction) [I21.4]  Chest pain [R07.9]  Acute congestive heart failure, unspecified heart failure type [I50.9]    Admission Date: 2/6/2024  Expected Discharge Date:     Transition of Care Barriers: (P) None    Payor: [employer] / Plan: [facility] [employer] / Product Type: Government /     Extended Emergency Contact Information  Primary Emergency Contact: Tiff Epps  Mobile Phone: [phone]  Relation: Spouse   needed? No    Discharge Plan A: (P) Home with family  Discharge Plan B: (P) Home Health    No Pharmacies Listed    Initial Assessment (most recent)       Adult Discharge Assessment - 02/08/24 0918          Discharge Assessment    Assessment Type Discharge Planning Assessment     Confirmed/corrected address, phone number and insurance Yes     Confirmed Demographics Correct on Facesheet     Source of Information patient     When was your [facility]? --   [facility]    Communicated ANNE-MARIE with patient/caregiver Date not available/Unable to determine (P)      Reason For Admission chf (P)      People in Home spouse (P)      Facility Arrived From: home (P)      Do you expect to return to your current living situation? Yes (P)      Do you have help at home or someone to help you manage your care at home? Yes (P)      Who are your caregiver(s) and their phone number(s)? spouse (P)      Prior to hospitilization cognitive status: Alert/Oriented (P)      Current cognitive status: Alert/Oriented (P)      Walking or Climbing Stairs Difficulty yes (P)      Walking or Climbing Stairs ambulation difficulty, requires equipment (P)      Mobility Management uses rollator (P)      Dressing/Bathing Difficulty no (P)      Equipment Currently Used at Home walker, rolling;CPAP;glucometer  (P)      Readmission within 30 days? No (P)      Patient currently being followed by outpatient case management? No (P)      Do you currently have service(s) that help you manage your care at home? No (P)      Do you take prescription medications? Yes (P)      Do you have prescription coverage? Yes (P)      Coverage [facility] (P)      Do you have any problems affording any of your prescribed medications? No (P)      Who is going to help you get home at discharge? spouse (P)      How do you get to doctors appointments? car, drives self (P)      Are you on dialysis? No (P)      Do you take coumadin? No (P)      Discharge Plan A Home with family (P)      Discharge Plan B Home Health (P)      DME Needed Upon Discharge  none (P)      Discharge Plan discussed with: Patient (P)      Transition of Care Barriers None (P)                       Patient lives with spouse who can be his help at home.  He ambulates with a rollator.  Otherwise independent with ADL's.  No needs.

## 2024-02-08 NOTE — SUBJECTIVE & OBJECTIVE
Interval History:     Scheduled for cardiac catheterization today, NPO since midnight, denied acute issues overnight.  Alert and oriented x3, stated improvement in dyspnea, still bilateral lower extremity swelling, gradually improving.        Review of Systems        Constitutional:  Positive for fatigue. Negative for appetite change, chills, diaphoresis and fever.   HENT:  Negative for congestion, nosebleeds, sore throat and trouble swallowing.    Eyes:  Negative for pain, discharge and visual disturbance.   Respiratory:  Positive for shortness of breath. Negative for apnea, cough, chest tightness, wheezing and stridor.    Cardiovascular:  Positive for leg swelling. Negative for palpitations.   Gastrointestinal:  Negative for abdominal distention, abdominal pain, blood in stool, constipation, diarrhea, nausea and vomiting.   Endocrine: Negative for cold intolerance and heat intolerance.   Genitourinary:  Negative for difficulty urinating, dysuria, flank pain, frequency and urgency.   Musculoskeletal:  Negative for arthralgias, back pain, joint swelling, myalgias, neck pain and neck stiffness.   Skin:  Negative for rash and wound.   Allergic/Immunologic: Negative for food allergies and immunocompromised state.   Neurological:  Positive for weakness. Negative for dizziness, seizures, syncope, facial asymmetry, light-headedness and headaches.   Hematological:  Negative for adenopathy.   Psychiatric/Behavioral:  Negative for agitation, behavioral problems and confusion. The patient is not nervous/anxious.      Objective:     Vital Signs (Most Recent):  Temp: 98.1 °F (36.7 °C) (02/08/24 0833)  Pulse: 87 (02/08/24 0833)  Resp: 18 (02/08/24 0833)  BP: (!) 121/90 (02/08/24 0833)  SpO2: (!) 94 % (02/08/24 0833) Vital Signs (24h Range):  Temp:  [97.6 °F (36.4 °C)-98.2 °F (36.8 °C)] 98.1 °F (36.7 °C)  Pulse:  [84-99] 87  Resp:  [16-18] 18  SpO2:  [91 %-94 %] 94 %  BP: (121-134)/(80-90) 121/90     Weight: 123.3 kg (271 lb 13.2  "oz)  Body mass index is 35.86 kg/m².  No intake or output data in the 24 hours ending 02/08/24 1133      Physical Exam        Constitutional:       General: He is not in acute distress.     Appearance: He is well-developed. He is not diaphoretic.   HENT:      Head: Normocephalic and atraumatic.      Nose: Nose normal.   Eyes:      General: No scleral icterus.     Conjunctiva/sclera: Conjunctivae normal.   Cardiovascular:      Rate and Rhythm: Normal rate and regular rhythm.      Heart sounds: Normal heart sounds. No murmur heard.     No friction rub. No gallop.   Pulmonary:      Effort: Tachypnea and accessory muscle usage present. No respiratory distress.      Breath sounds: No stridor. Rales present. No wheezing.   Chest:      Chest wall: No tenderness.   Abdominal:      General: Bowel sounds are normal. There is no distension.      Palpations: Abdomen is soft.      Tenderness: There is no abdominal tenderness. There is no guarding or rebound.   Musculoskeletal:         General: No tenderness or deformity. Normal range of motion.      Cervical back: Normal range of motion and neck supple.      Right lower leg: Edema (+2) present.      Left lower leg: Edema (+2) present.   Skin:     General: Skin is warm and dry.      Coloration: Skin is not pale.      Findings: No erythema or rash.   Neurological:      Mental Status: He is alert and oriented to person, place, and time.      Cranial Nerves: No cranial nerve deficit.      Motor: No abnormal muscle tone.      Coordination: Coordination normal.      Deep Tendon Reflexes: Reflexes are normal and symmetric.   Psychiatric:         Behavior: Behavior normal.         Thought Content: Thought content normal.        Significant Labs: All pertinent labs within the past 24 hours have been reviewed.  CBC: No results for input(s): "WBC", "HGB", "HCT", "PLT" in the last 48 hours.  CMP: No results for input(s): "NA", "K", "CL", "CO2", "GLU", "BUN", "CREATININE", "CALCIUM", "PROT", " ""ALBUMIN", "BILITOT", "ALKPHOS", "AST", "ALT", "ANIONGAP", "EGFRNONAA" in the last 48 hours.    Invalid input(s): "ESTGF[place]"    Significant Imaging:     Imaging Results              X-Ray Chest AP Portable (Final result)  Result time 02/06/24 08:02:42      Final result by Shahid Louise MD (02/06/24 08:02:42)                   Impression:      As above.      Electronically signed by: Shahid Louise  Date:    02/06/2024  Time:    08:02               Narrative:    EXAMINATION:  XR CHEST AP PORTABLE    CLINICAL HISTORY:  Chest Pain;.    TECHNIQUE:  Single frontal portable view of the chest was performed.    COMPARISON:  None    FINDINGS:  Enlarged cardiomediastinal silhouette presumed related to cardiomegaly.  Pericardial effusion not excluded.  Pulmonary vascular congestion with mild interstitial edema.  Ill-defined opacification left lung base obscured by the heart border.  Left pleural effusion and left retrocardiac airspace disease not excluded.  No pneumothorax.                                     "

## 2024-02-08 NOTE — PROGRESS NOTES
"[facility]) nurse navigator notified of [facility] candidate in need of education and introduction to 4-6 week program.      PT aao x 3 while lying in bed  with Supriya, spouse at bedside. Introduced self to pt as [facility] nurse navigator.     Patient given "Home Care Guide for Heart Failure Patients" , "[facility]" flyer and "Daily weight and symptom tracker".  Encouraged pt and caregiver to review information.      Reviewed the following key points of H[facility] program with pt and family:   1.) Take your medications as directed.    2.) Weight yourself daily   3.) Follow low salt and limited fluid diet.    4.) Stop smoking and start exercising   5.) Go to your appointments and call your team.      Pt reminded to follow Symptom tracker and to call at the onset of symptoms according to tracker.     Reviewed plan for follow up once discharged to include phone calls, in person and virtual visits to assist pt optimizing their heart failure medication regimen and encouraging healthy lifestyle modifications.  Reminded pt that program will assist them over the next 4-6 weeks and then patient will be transferred to long term care provider .  Reminded pt how to contact H[facility] navigator via phone and or via [employer].     Pt instructed appointment will be printed on hospital discharge paperwork.     Pt also reminded HF nurse will call 48-72 hours after discharge to check on them.     PT and family verbalize read back of information given.  Encouraged pt and family to read over information often and contact team with any questions or concerns.      "

## 2024-02-08 NOTE — SUBJECTIVE & OBJECTIVE
Review of Systems   Cardiovascular:  Positive for chest pain. Negative for dyspnea on exertion, palpitations and syncope.   Genitourinary: Negative.    Neurological: Negative.      Objective:     Vital Signs (Most Recent):  Temp: 98.1 °F (36.7 °C) (02/08/24 0833)  Pulse: 87 (02/08/24 0833)  Resp: 18 (02/08/24 0833)  BP: (!) 121/90 (02/08/24 0833)  SpO2: (!) 94 % (02/08/24 0833) Vital Signs (24h Range):  Temp:  [97.6 °F (36.4 °C)-98.2 °F (36.8 °C)] 98.1 °F (36.7 °C)  Pulse:  [84-99] 87  Resp:  [16-18] 18  SpO2:  [91 %-94 %] 94 %  BP: (121-134)/(80-90) 121/90     Weight: 123.3 kg (271 lb 13.2 oz)  Body mass index is 35.86 kg/m².     SpO2: (!) 94 %       No intake or output data in the 24 hours ending 02/08/24 1120    Lines/Drains/Airways       Peripheral Intravenous Line  Duration                  Peripheral IV - Single Lumen 02/06/24 2340 20 G Posterior;Right Hand 1 day                       Physical Exam  Vitals reviewed.   Constitutional:       Appearance: He is well-developed.   Neck:      Vascular: No carotid bruit.   Cardiovascular:      Rate and Rhythm: Normal rate and regular rhythm.      Pulses: Intact distal pulses.      Heart sounds: Normal heart sounds. No murmur heard.  Pulmonary:      Breath sounds: Normal breath sounds.   Neurological:      Mental Status: He is oriented to person, place, and time.            Significant Labs: All pertinent lab results from the last 24 hours have been reviewed. and   Recent Lab Results       None            Significant Imaging: Echocardiogram: Transthoracic echo (TTE) complete (Cupid Only):   Results for orders placed or performed during the hospital encounter of 02/06/24   Echo   Result Value Ref Range    BSA 2.69 m2    Greene's Biplane MOD Ejection Fraction 31 %    LVOT stroke volume 45.85 cm3    LVIDd 6.07 (A) 3.5 - 6.0 cm    LV Systolic Volume 150.23 mL    LV Systolic Volume Index 58.0 mL/m2    LVIDs 5.55 (A) 2.1 - 4.0 cm    LV Diastolic Volume 184.97 mL    LV  Diastolic Volume Index 71.42 mL/m2    IVS 1.52 (A) 0.6 - 1.1 cm    LVOT diameter 2.08 cm    LVOT area 3.4 cm2    FS 9 (A) 28 - 44 %    Left Ventricle Relative Wall Thickness 0.50 cm    Posterior Wall 1.52 (A) 0.6 - 1.1 cm    LV mass 443.56 g    LV Mass Index 171 g/m2    MV Peak E Simon 0.77 m/s    TDI LATERAL 0.09 m/s    TDI SEPTAL 0.07 m/s    E/E' ratio 9.63 m/s    MV Peak A Simon 0.31 m/s    TR Max Simon 2.82 m/s    E/A ratio 2.48     IVRT 60.89 msec    E wave deceleration time 119.06 msec    LV SEPTAL E/E' RATIO 11.00 m/s    LV LATERAL E/E' RATIO 8.56 m/s    LVOT peak simon 0.73 m/s    Left Ventricular Outflow Tract Mean Velocity 0.63 cm/s    Left Ventricular Outflow Tract Mean Gradient 1.63 mmHg    RVDD 2.96 cm    RVOT peak VTI 7.1 cm    TAPSE 1.64 cm    LA size 4.93 cm    Left Atrium Minor Axis 5.85 cm    Left Atrium Major Axis 6.08 cm    RA Major Axis 5.15 cm    AV regurgitation pressure 1/2 time [id number] ms    AR Max Simon 1.88 m/s    AV mean gradient 5 mmHg    AV peak gradient 8 mmHg    Ao peak simon 1.37 m/s    Ao VTI 25.10 cm    LVOT peak VTI 13.50 cm    AV valve area 1.83 cm²    AV Velocity Ratio 0.53     AV index (prosthetic) 0.54     GINGER by Velocity Ratio 1.81 cm²    Mr max simon 3.61 m/s    MV stenosis pressure 1/2 time 34.53 ms    MV valve area p 1/2 method 6.37 cm2    TV mean gradient 32 mmHg    Triscuspid Valve Regurgitation Peak Gradient 32 mmHg    PV mean gradient 1 mmHg    RVOT peak simon 0.49 m/s    Ao root annulus 3.49 cm    STJ 3.66 cm    Ascending aorta 3.62 cm    IVC diameter 2.65 cm    Mean e' 0.08 m/s    ZLVIDS -5.06     ZLVIDD -10.82     LA Volume Index 42.4 mL/m2    LA volume 109.94 cm3    LA WIDTH 4.4 cm    RA Width 3.3 cm    TV resting pulmonary artery pressure 47 mmHg    RV TB RVSP 18 mmHg    Est. RA pres 15 mmHg    Narrative      Left Ventricle: The left ventricle is moderately dilated. Normal wall   thickness. Severe global hypokinesis present. There is severely reduced   systolic  function with a visually estimated ejection fraction of 25 - 30%.   Grade II diastolic dysfunction.    Right Ventricle: Normal right ventricular cavity size. Wall thickness   is normal. Right ventricle wall motion  is normal. Systolic function is   mildly reduced.    Left Atrium: Left atrium is moderately dilated.    Right Atrium: Right atrium is mildly dilated.    Aortic Valve: There is mild aortic regurgitation.    Mitral Valve: There is mild regurgitation.    Tricuspid Valve: There is mild regurgitation.    Pulmonary Artery: The estimated pulmonary artery systolic pressure is   47 mmHg.    IVC/SVC: Elevated venous pressure at 15 mmHg.

## 2024-02-08 NOTE — PROGRESS NOTES
[facility] ([facility])  Cardiology  Progress Note    Patient Name: Ron Matthew  MRN: [id number]  Admission Date: 2/6/2024  Hospital Length of Stay: 2 days  Code Status: Full Code   Attending Physician: Heidi Hernandez,*   Primary Care Physician: Cheri Dietz  Expected Discharge Date:   Principal Problem:Acute on chronic combined systolic and diastolic congestive heart failure    Subjective:     Hospital Course:   2/7/24 Pt seen and examined today, feels ok reports his SOB is improving, denies any CP at this time. LE edema improving. Labs reviewed, chart reviewed  2.8.2024 complained of left-sided chest pain earlier today, however not retrosternal.         Review of Systems   Cardiovascular:  Positive for chest pain. Negative for dyspnea on exertion, palpitations and syncope.   Genitourinary: Negative.    Neurological: Negative.      Objective:     Vital Signs (Most Recent):  Temp: 98.1 °F (36.7 °C) (02/08/24 0833)  Pulse: 87 (02/08/24 0833)  Resp: 18 (02/08/24 0833)  BP: (!) 121/90 (02/08/24 0833)  SpO2: (!) 94 % (02/08/24 0833) Vital Signs (24h Range):  Temp:  [97.6 °F (36.4 °C)-98.2 °F (36.8 °C)] 98.1 °F (36.7 °C)  Pulse:  [84-99] 87  Resp:  [16-18] 18  SpO2:  [91 %-94 %] 94 %  BP: (121-134)/(80-90) 121/90     Weight: 123.3 kg (271 lb 13.2 oz)  Body mass index is 35.86 kg/m².     SpO2: (!) 94 %       No intake or output data in the 24 hours ending 02/08/24 1120    Lines/Drains/Airways       Peripheral Intravenous Line  Duration                  Peripheral IV - Single Lumen 02/06/24 2340 20 G Posterior;Right Hand 1 day                       Physical Exam  Vitals reviewed.   Constitutional:       Appearance: He is well-developed.   Neck:      Vascular: No carotid bruit.   Cardiovascular:      Rate and Rhythm: Normal rate and regular rhythm.      Pulses: Intact distal pulses.      Heart sounds: Normal heart sounds. No murmur heard.  Pulmonary:      Breath sounds: Normal breath  sounds.   Neurological:      Mental Status: He is oriented to person, place, and time.            Significant Labs: All pertinent lab results from the last 24 hours have been reviewed. and   Recent Lab Results       None            Significant Imaging: Echocardiogram: Transthoracic echo (TTE) complete (Cupid Only):   Results for orders placed or performed during the hospital encounter of 02/06/24   Echo   Result Value Ref Range    BSA 2.69 m2    Greene's Biplane MOD Ejection Fraction 31 %    LVOT stroke volume 45.85 cm3    LVIDd 6.07 (A) 3.5 - 6.0 cm    LV Systolic Volume 150.23 mL    LV Systolic Volume Index 58.0 mL/m2    LVIDs 5.55 (A) 2.1 - 4.0 cm    LV Diastolic Volume 184.97 mL    LV Diastolic Volume Index 71.42 mL/m2    IVS 1.52 (A) 0.6 - 1.1 cm    LVOT diameter 2.08 cm    LVOT area 3.4 cm2    FS 9 (A) 28 - 44 %    Left Ventricle Relative Wall Thickness 0.50 cm    Posterior Wall 1.52 (A) 0.6 - 1.1 cm    LV mass 443.56 g    LV Mass Index 171 g/m2    MV Peak E Simon 0.77 m/s    TDI LATERAL 0.09 m/s    TDI SEPTAL 0.07 m/s    E/E' ratio 9.63 m/s    MV Peak A Simon 0.31 m/s    TR Max Simon 2.82 m/s    E/A ratio 2.48     IVRT 60.89 msec    E wave deceleration time 119.06 msec    LV SEPTAL E/E' RATIO 11.00 m/s    LV LATERAL E/E' RATIO 8.56 m/s    LVOT peak simon 0.73 m/s    Left Ventricular Outflow Tract Mean Velocity 0.63 cm/s    Left Ventricular Outflow Tract Mean Gradient 1.63 mmHg    RVDD 2.96 cm    RVOT peak VTI 7.1 cm    TAPSE 1.64 cm    LA size 4.93 cm    Left Atrium Minor Axis 5.85 cm    Left Atrium Major Axis 6.08 cm    RA Major Axis 5.15 cm    AV regurgitation pressure 1/2 time 878.654538343703007 ms    AR Max Simon 1.88 m/s    AV mean gradient 5 mmHg    AV peak gradient 8 mmHg    Ao peak simon 1.37 m/s    Ao VTI 25.10 cm    LVOT peak VTI 13.50 cm    AV valve area 1.83 cm²    AV Velocity Ratio 0.53     AV index (prosthetic) 0.54     GINGER by Velocity Ratio 1.81 cm²    Mr max simon 3.61 m/s    MV stenosis pressure 1/2 time  34.53 ms    MV valve area p 1/2 method 6.37 cm2    TV mean gradient 32 mmHg    Triscuspid Valve Regurgitation Peak Gradient 32 mmHg    PV mean gradient 1 mmHg    RVOT peak tank 0.49 m/s    Ao root annulus 3.49 cm    STJ 3.66 cm    Ascending aorta 3.62 cm    IVC diameter 2.65 cm    Mean e' 0.08 m/s    ZLVIDS -5.06     ZLVIDD -10.82     LA Volume Index 42.4 mL/m2    LA volume 109.94 cm3    LA WIDTH 4.4 cm    RA Width 3.3 cm    TV resting pulmonary artery pressure 47 mmHg    RV TB RVSP 18 mmHg    Est. RA pres 15 mmHg    Narrative      Left Ventricle: The left ventricle is moderately dilated. Normal wall   thickness. Severe global hypokinesis present. There is severely reduced   systolic function with a visually estimated ejection fraction of 25 - 30%.   Grade II diastolic dysfunction.    Right Ventricle: Normal right ventricular cavity size. Wall thickness   is normal. Right ventricle wall motion  is normal. Systolic function is   mildly reduced.    Left Atrium: Left atrium is moderately dilated.    Right Atrium: Right atrium is mildly dilated.    Aortic Valve: There is mild aortic regurgitation.    Mitral Valve: There is mild regurgitation.    Tricuspid Valve: There is mild regurgitation.    Pulmonary Artery: The estimated pulmonary artery systolic pressure is   47 mmHg.    IVC/SVC: Elevated venous pressure at 15 mmHg.       Assessment and Plan:     Brief HPI:     * Acute on chronic combined systolic and diastolic congestive heart failure  BNP elevated  Echo with 25-30% EF  Cont OMT IV diuresis, coreg, lisinopril  Recommend R/LHC once compensated  Will plan on switching to entresto in future  Strict I/Os  Low Na diet    2/7/24  R/LHC planned for tomorrow  All risks, benefits and treatment alternatives explained all questions answered pt agreeable to proceed   NPO mn    2.8.2024  Appears to be euvolemic today.    No prior ischemic workup.    Going for right and left heart catheterization today for severe  cardiomyopathy.      Stroke  H/o SAH followed by CVT    CAD (coronary artery disease)  Cont asa, statin, BB, plavix  R/LHC once compensated    2/7/24  R/LHC planned for tomorrow    Hypertension  Titrate medications  Will plan on switching to entresto possibly OP    Elevated troponin  Troponin flat, likely demand  Echo with low EF  Recommend R/LHC once compensated  Cont ASA, statin, BB    2/7/24  R/LHC tomorrow  Npo mn    Carotid stenosis, asymptomatic, bilateral  Followed by CVT  Cont home regimen        VTE Risk Mitigation (From admission, onward)           Ordered     heparin (porcine) injection 5,000 Units  Every 8 hours         02/06/24 0638     IP VTE HIGH RISK PATIENT  Once         02/06/24 0638     Place sequential compression device  Until discontinued         02/06/24 0638                    Genie Claros MD  Cardiology  [facility] ([facility])

## 2024-02-09 LAB
ANION GAP SERPL CALC-SCNC: 15 MMOL/L (ref 8–16)
BASOPHILS # BLD AUTO: 0.03 K/UL (ref 0–0.2)
BASOPHILS NFR BLD: 1 % (ref 0–1.9)
BUN SERPL-MCNC: 12 MG/DL (ref 8–23)
CALCIUM SERPL-MCNC: 9.2 MG/DL (ref 8.7–10.5)
CHLORIDE SERPL-SCNC: 100 MMOL/L (ref 95–110)
CO2 SERPL-SCNC: 27 MMOL/L (ref 23–29)
CREAT SERPL-MCNC: 1 MG/DL (ref 0.5–1.4)
DIFFERENTIAL METHOD BLD: ABNORMAL
EOSINOPHIL # BLD AUTO: 0.1 K/UL (ref 0–0.5)
EOSINOPHIL NFR BLD: 4.5 % (ref 0–8)
ERYTHROCYTE [DISTWIDTH] IN BLOOD BY AUTOMATED COUNT: 17.8 % (ref 11.5–14.5)
EST. GFR  (NO RACE VARIABLE): >60 ML/MIN/1.73 M^2
GLUCOSE SERPL-MCNC: 107 MG/DL (ref 70–110)
HCT VFR BLD AUTO: 49.1 % (ref 40–54)
HGB BLD-MCNC: 16.5 G/DL (ref 14–18)
IMM GRANULOCYTES # BLD AUTO: 0.01 K/UL (ref 0–0.04)
IMM GRANULOCYTES NFR BLD AUTO: 0.3 % (ref 0–0.5)
LYMPHOCYTES # BLD AUTO: 0.8 K/UL (ref 1–4.8)
LYMPHOCYTES NFR BLD: 26.9 % (ref 18–48)
MCH RBC QN AUTO: 29.5 PG (ref 27–31)
MCHC RBC AUTO-ENTMCNC: 33.6 G/DL (ref 32–36)
MCV RBC AUTO: 88 FL (ref 82–98)
MONOCYTES # BLD AUTO: 0.5 K/UL (ref 0.3–1)
MONOCYTES NFR BLD: 15.6 % (ref 4–15)
NEUTROPHILS # BLD AUTO: 1.6 K/UL (ref 1.8–7.7)
NEUTROPHILS NFR BLD: 51.7 % (ref 38–73)
NRBC BLD-RTO: 0 /100 WBC
PLATELET # BLD AUTO: 180 K/UL (ref 150–450)
PMV BLD AUTO: 11 FL (ref 9.2–12.9)
POCT GLUCOSE: 121 MG/DL (ref 70–110)
POCT GLUCOSE: 123 MG/DL (ref 70–110)
POCT GLUCOSE: 158 MG/DL (ref 70–110)
POCT GLUCOSE: 168 MG/DL (ref 70–110)
POTASSIUM SERPL-SCNC: 3.3 MMOL/L (ref 3.5–5.1)
RBC # BLD AUTO: 5.6 M/UL (ref 4.6–6.2)
SODIUM SERPL-SCNC: 142 MMOL/L (ref 136–145)
WBC # BLD AUTO: 3.08 K/UL (ref 3.9–12.7)

## 2024-02-09 PROCEDURE — 85025 COMPLETE CBC W/AUTO DIFF WBC: CPT | Performed by: STUDENT IN AN ORGANIZED HEALTH CARE EDUCATION/TRAINING PROGRAM

## 2024-02-09 PROCEDURE — 25000003 PHARM REV CODE 250: Performed by: INTERNAL MEDICINE

## 2024-02-09 PROCEDURE — 21400001 HC TELEMETRY ROOM

## 2024-02-09 PROCEDURE — 80048 BASIC METABOLIC PNL TOTAL CA: CPT | Performed by: STUDENT IN AN ORGANIZED HEALTH CARE EDUCATION/TRAINING PROGRAM

## 2024-02-09 PROCEDURE — 25000003 PHARM REV CODE 250: Performed by: NURSE PRACTITIONER

## 2024-02-09 PROCEDURE — 36415 COLL VENOUS BLD VENIPUNCTURE: CPT | Performed by: STUDENT IN AN ORGANIZED HEALTH CARE EDUCATION/TRAINING PROGRAM

## 2024-02-09 PROCEDURE — 94761 N-INVAS EAR/PLS OXIMETRY MLT: CPT

## 2024-02-09 PROCEDURE — 99233 SBSQ HOSP IP/OBS HIGH 50: CPT | Mod: ,,, | Performed by: INTERNAL MEDICINE

## 2024-02-09 PROCEDURE — 63600175 PHARM REV CODE 636 W HCPCS: Performed by: NURSE PRACTITIONER

## 2024-02-09 RX ORDER — TALC
6 POWDER (GRAM) TOPICAL NIGHTLY PRN
Status: DISCONTINUED | OUTPATIENT
Start: 2024-02-09 | End: 2024-02-10 | Stop reason: HOSPADM

## 2024-02-09 RX ADMIN — SENNOSIDES AND DOCUSATE SODIUM 1 TABLET: 8.6; 5 TABLET ORAL at 08:02

## 2024-02-09 RX ADMIN — HEPARIN SODIUM 5000 UNITS: 5000 INJECTION INTRAVENOUS; SUBCUTANEOUS at 03:02

## 2024-02-09 RX ADMIN — Medication 6 MG: at 09:02

## 2024-02-09 RX ADMIN — FUROSEMIDE 40 MG: 10 INJECTION, SOLUTION INTRAMUSCULAR; INTRAVENOUS at 08:02

## 2024-02-09 RX ADMIN — ACETAMINOPHEN 650 MG: 325 TABLET ORAL at 08:02

## 2024-02-09 RX ADMIN — HEPARIN SODIUM 5000 UNITS: 5000 INJECTION INTRAVENOUS; SUBCUTANEOUS at 05:02

## 2024-02-09 RX ADMIN — CLOPIDOGREL BISULFATE 75 MG: 75 TABLET ORAL at 08:02

## 2024-02-09 RX ADMIN — LEVOTHYROXINE SODIUM 25 MCG: 0.03 TABLET ORAL at 05:02

## 2024-02-09 RX ADMIN — METOLAZONE 5 MG: 5 TABLET ORAL at 08:02

## 2024-02-09 RX ADMIN — CARVEDILOL 12.5 MG: 12.5 TABLET, FILM COATED ORAL at 08:02

## 2024-02-09 RX ADMIN — AMLODIPINE BESYLATE 2.5 MG: 2.5 TABLET ORAL at 08:02

## 2024-02-09 RX ADMIN — SACUBITRIL AND VALSARTAN 1 TABLET: 49; 51 TABLET, FILM COATED ORAL at 08:02

## 2024-02-09 RX ADMIN — HEPARIN SODIUM 5000 UNITS: 5000 INJECTION INTRAVENOUS; SUBCUTANEOUS at 09:02

## 2024-02-09 RX ADMIN — ASPIRIN 81 MG CHEWABLE TABLET 81 MG: 81 TABLET CHEWABLE at 08:02

## 2024-02-09 NOTE — PROGRESS NOTES
[facility] ([facility])  Cardiology  Progress Note    Patient Name: Ron Matthew  MRN: [id number]  Admission Date: 2/6/2024  Hospital Length of Stay: 3 days  Code Status: Full Code   Attending Physician: Heidi Hernandez,*   Primary Care Physician: Mirela, Cheri  Expected Discharge Date:   Principal Problem:Acute on chronic combined systolic and diastolic congestive heart failure    Subjective:   HPI:   The patient is a 69 yo male with CAD, CHF-EF 45%, DM, HTN, HLD, Gout, JAMES, Prostate cancer, Stroke with right sided hemiparesis in 2020, Carotid dissection and SAH 1/2023 who presented to ED with SOB, BLE edema, and orthopnea x 5 days. Spouse reports he has not slept for several days due SOB. He did complain of chest pain yesterday but it resolved.   In the ED, BP mildly elevated, Afebrile, Oxygenation stable. +tachypnea. WBC 3.8, BNP 1238, Troponin 0.109. EKG-NSR with PACs, prolonged QT, no ischemia. CXR- Cardiomegaly. Pulmonary congestion  Cardiology consulted to assist with management.Pt seen and examined today in ED, c/o SOB, denies any CP at this time. Reports SAH last year on plavix. Labs reviewed, Crt 0.9, troponin flat, BNP elevated 1238 Echo with EF 25-30%, PA 47  Hospital Course:   2/7/24 Pt seen and examined today, feels ok reports his SOB is improving, denies any CP at this time. LE edema improving. Labs reviewed, chart reviewed  2.8.2024 complained of left-sided chest pain earlier today, however not retrosternal.     2/9/24 pt seen and examined today s/p [facility] nonobstructive disease EF noted to be 25% on angiogram. Discussed LifeVest today with pt, pt agreeable. Labs reviewed, chart reviewed        Review of Systems   Constitutional: Negative.   HENT: Negative.     Eyes: Negative.    Cardiovascular: Negative.    Respiratory: Negative.     Skin: Negative.    Musculoskeletal: Negative.    Gastrointestinal: Negative.    Genitourinary: Negative.    Neurological: Negative.     Psychiatric/Behavioral: Negative.       Objective:     Vital Signs (Most Recent):  Temp: 96.9 °F (36.1 °C) (02/09/24 0800)  Pulse: 74 (02/09/24 0800)  Resp: 18 (02/09/24 0800)  BP: 136/88 (02/09/24 0800)  SpO2: 95 % (02/09/24 0800) Vital Signs (24h Range):  Temp:  [96.9 °F (36.1 °C)-98.3 °F (36.8 °C)] 96.9 °F (36.1 °C)  Pulse:  [74-92] 74  Resp:  [12-25] 18  SpO2:  [93 %-98 %] 95 %  BP: (125-152)/() 136/88     Weight: 120.4 kg (265 lb 6.9 oz)  Body mass index is 35.02 kg/m².     SpO2: 95 %         Intake/Output Summary (Last 24 hours) at 2/9/2024 1038  Last data filed at 2/9/2024 0511  Gross per 24 hour   Intake 120 ml   Output 2600 ml   Net -2480 ml       Lines/Drains/Airways       Peripheral Intravenous Line  Duration                  Peripheral IV - Single Lumen 02/06/24 2340 20 G Posterior;Right Hand 2 days                       Physical Exam  Vitals and nursing note reviewed.   Constitutional:       Appearance: Normal appearance.   HENT:      Head: Normocephalic and atraumatic.   Eyes:      General:         Right eye: No discharge.         Left eye: No discharge.      Pupils: Pupils are equal, round, and reactive to light.   Cardiovascular:      Rate and Rhythm: Normal rate and regular rhythm.      Heart sounds: S1 normal and S2 normal. No murmur heard.     No friction rub.   Pulmonary:      Effort: Pulmonary effort is normal. No respiratory distress.      Breath sounds: Normal breath sounds. No rales.   Abdominal:      Palpations: Abdomen is soft.      Tenderness: There is no abdominal tenderness.   Musculoskeletal:      Cervical back: Neck supple.      Right lower leg: Edema present.      Left lower leg: Edema present.      Comments: improving   Skin:     General: Skin is warm and dry.   Neurological:      General: No focal deficit present.      Mental Status: He is alert and oriented to person, place, and time.   Psychiatric:         Mood and Affect: Mood normal.         Behavior: Behavior normal.     "     Thought Content: Thought content normal.            Significant Labs: BMP:   Recent Labs   Lab 02/09/24  0609         K 3.3*      CO2 27   BUN 12   CREATININE 1.0   CALCIUM 9.2   , CMP   Recent Labs   Lab 02/09/24  0609      K 3.3*      CO2 27      BUN 12   CREATININE 1.0   CALCIUM 9.2   ANIONGAP 15   , CBC   Recent Labs   Lab 02/09/24  0609   WBC 3.08*   HGB 16.5   HCT 49.1      , INR No results for input(s): "INR", "PROTIME" in the last 48 hours., Lipid Panel No results for input(s): "CHOL", "HDL", "LDLCALC", "TRIG", "CHOLHDL" in the last 48 hours., Troponin No results for input(s): "TROPONINI" in the last 48 hours., and All pertinent lab results from the last 24 hours have been reviewed.    Significant Imaging: Cardiac Cath: reviewed, Echocardiogram: Transthoracic echo (TTE) complete (Cupid Only):   Results for orders placed or performed during the hospital encounter of 02/06/24   Echo   Result Value Ref Range    BSA 2.69 m2    Greene's Biplane MOD Ejection Fraction 31 %    LVOT stroke volume 45.85 cm3    LVIDd 6.07 (A) 3.5 - 6.0 cm    LV Systolic Volume 150.23 mL    LV Systolic Volume Index 58.0 mL/m2    LVIDs 5.55 (A) 2.1 - 4.0 cm    LV Diastolic Volume 184.97 mL    LV Diastolic Volume Index 71.42 mL/m2    IVS 1.52 (A) 0.6 - 1.1 cm    LVOT diameter 2.08 cm    LVOT area 3.4 cm2    FS 9 (A) 28 - 44 %    Left Ventricle Relative Wall Thickness 0.50 cm    Posterior Wall 1.52 (A) 0.6 - 1.1 cm    LV mass 443.56 g    LV Mass Index 171 g/m2    MV Peak E Simon 0.77 m/s    TDI LATERAL 0.09 m/s    TDI SEPTAL 0.07 m/s    E/E' ratio 9.63 m/s    MV Peak A Simon 0.31 m/s    TR Max Simon 2.82 m/s    E/A ratio 2.48     IVRT 60.89 msec    E wave deceleration time 119.06 msec    LV SEPTAL E/E' RATIO 11.00 m/s    LV LATERAL E/E' RATIO 8.56 m/s    LVOT peak simon 0.73 m/s    Left Ventricular Outflow Tract Mean Velocity 0.63 cm/s    Left Ventricular Outflow Tract Mean Gradient 1.63 mmHg    " RVDD 2.96 cm    RVOT peak VTI 7.1 cm    TAPSE 1.64 cm    LA size 4.93 cm    Left Atrium Minor Axis 5.85 cm    Left Atrium Major Axis 6.08 cm    RA Major Axis 5.15 cm    AV regurgitation pressure 1/2 time [id number] ms    AR Max Simon 1.88 m/s    AV mean gradient 5 mmHg    AV peak gradient 8 mmHg    Ao peak simon 1.37 m/s    Ao VTI 25.10 cm    LVOT peak VTI 13.50 cm    AV valve area 1.83 cm²    AV Velocity Ratio 0.53     AV index (prosthetic) 0.54     GINGER by Velocity Ratio 1.81 cm²    Mr max simon 3.61 m/s    MV stenosis pressure 1/2 time 34.53 ms    MV valve area p 1/2 method 6.37 cm2    TV mean gradient 32 mmHg    Triscuspid Valve Regurgitation Peak Gradient 32 mmHg    PV mean gradient 1 mmHg    RVOT peak simon 0.49 m/s    Ao root annulus 3.49 cm    STJ 3.66 cm    Ascending aorta 3.62 cm    IVC diameter 2.65 cm    Mean e' 0.08 m/s    ZLVIDS -5.06     ZLVIDD -10.82     LA Volume Index 42.4 mL/m2    LA volume 109.94 cm3    LA WIDTH 4.4 cm    RA Width 3.3 cm    TV resting pulmonary artery pressure 47 mmHg    RV TB RVSP 18 mmHg    Est. RA pres 15 mmHg    Narrative      Left Ventricle: The left ventricle is moderately dilated. Normal wall   thickness. Severe global hypokinesis present. There is severely reduced   systolic function with a visually estimated ejection fraction of 25 - 30%.   Grade II diastolic dysfunction.    Right Ventricle: Normal right ventricular cavity size. Wall thickness   is normal. Right ventricle wall motion  is normal. Systolic function is   mildly reduced.    Left Atrium: Left atrium is moderately dilated.    Right Atrium: Right atrium is mildly dilated.    Aortic Valve: There is mild aortic regurgitation.    Mitral Valve: There is mild regurgitation.    Tricuspid Valve: There is mild regurgitation.    Pulmonary Artery: The estimated pulmonary artery systolic pressure is   47 mmHg.    IVC/SVC: Elevated venous pressure at 15 mmHg.     , EKG: reviewed, Stress Test: reviewed, and X-Ray: CXR:  X-Ray Chest 1 View (CXR): No results found for this visit on 02/06/24.  Assessment and Plan:       * Acute on chronic combined systolic and diastolic congestive heart failure  BNP elevated  Echo with 25-30% EF  Cont OMT IV diuresis, coreg, lisinopril  Recommend R/LHC once compensated  Will plan on switching to entresto in future  Strict I/Os  Low Na diet    2/7/24  R/LHC planned for tomorrow  All risks, benefits and treatment alternatives explained all questions answered pt agreeable to proceed   NPO mn    2.8.2024  Appears to be euvolemic today.    No prior ischemic workup.    Going for right and left heart catheterization today for severe cardiomyopathy.    2/9/24  R/LHC yesterday nonobstructive, EF 25%, moderate pulm HTN  Cont OMT BB, entresto, lasix  Strict I/Os  LifeVest ordered  F/u in clinic      Stroke  H/o SAH followed by CVT    CAD (coronary artery disease)  Cont asa, statin, BB, plavix  R/LHC once compensated    2/7/24  R/LHC planned for tomorrow    2/9/24  Asa, statin, BB, plavix  LHC nonobs EF 25%, mod pulm HTN    Hypertension  Titrate medications      Elevated troponin  Troponin flat, likely demand  Echo with low EF  Recommend R/LHC once compensated  Cont ASA, statin, BB    2/7/24  R/LHC tomorrow  Npo mn    2/8/24  S/p LHC nonobstrctive, EF 25%    Carotid stenosis, asymptomatic, bilateral  Followed by CVT  Cont home regimen        VTE Risk Mitigation (From admission, onward)           Ordered     heparin (porcine) injection 5,000 Units  Every 8 hours         02/06/24 0638     IP VTE HIGH RISK PATIENT  Once         02/06/24 0638     Place sequential compression device  Until discontinued         02/06/24 0638                    Zhane Ta, NP  Cardiology  [facility] Tele[facility] ([facility])

## 2024-02-09 NOTE — PLAN OF CARE
Life Vest approved.  Plan for discharge and life vest fitting tomorrow.       02/09/24 1100   Post-Acute Status   Post-Acute Authorization HME   E Status Set-up Complete/Auth obtained   Discharge Plan   Discharge Plan A Home with family

## 2024-02-09 NOTE — PLAN OF CARE
Referral for Life Vest faxed to Brad; notified Tip.  [facility] discharge worksheet and clinicals for life vest e-mailed to  Brianda Coppola.    Spoke with patient and wife.  Wife states she is calling his PCP to expedite the approval.       02/09/24 1100   Post-Acute Status   Post-Acute Authorization [facility] Status Referrals Sent   Discharge Plan   Discharge Plan A Home with family

## 2024-02-09 NOTE — PROGRESS NOTES
[facility] - Telemetry ([facility])  [facility] Medicine  Progress Note    Patient Name: Ron Matthew  MRN: [id number]  Patient Class: IP- Inpatient   Admission Date: 2/6/2024  Length of Stay: 3 days  Attending Physician: Heidi Hernandez,*  Primary Care Provider: Administration, Cheri        Subjective:     Principal Problem:Acute on chronic combined systolic and diastolic congestive heart failure        HPI:  The patient is a 69 yo male with CAD, CHF-EF 45%, DM, HTN, HLD, Gout, JAMES, Prostate cancer, Stroke with right sided hemiparesis in 2020, Carotid dissection and SAH 1/2023 who presented to ED with SOB, BLE edema, and orthopnea x 5 days. Spouse reports he has not slept for several days due SOB. He did complain of chest pain yesterday but it resolved.     In the ED, BP mildly elevated, Afebrile, Oxygenation stable. +tachypnea. WBC 3.8, BNP 1238, Troponin 0.109. EKG-NSR with PACs, prolonged QT, no ischemia. CXR- Cardiomegaly. Pulmonary congestion.      Overview/Hospital Course:  patient is a 69 yo male with CAD, CHF, DM, HTN, HLD, Gout, JAMES, Prostate cancer, Stroke with right sided hemiparesis in 2020, Carotid dissection and SAH 1/2023 who presented to ED with SOB, BLE edema, and orthopnea x 5 days.      Admitted under Hospital Medicine for acute on chronic combined systolic and diastolic heart failure.    Echo as of today with ejection fraction 25-30% EF.  Plan to continue IV diuretics, Coreg, lisinopril, cardiology plans for possible right/left heart catheterization on 02/08/2024,     s/p [facility] nonobstructive disease EF noted to be 25% on angiogram as of 2/9/24    Interval History:     No acute events overnight   Alert and oriented x3   Hemodynamically stable   Still with lower extremity swelling, we will continue IV diuretics   Cardiology recommended LifeVest,  worked on arrangements   Likely LifeVest fitting tomorrow, anticipate discharge in next 24-48 hours       Review of  Systems      Constitutional:  Positive for fatigue. Negative for appetite change, chills, diaphoresis and fever.   HENT:  Negative for congestion, nosebleeds, sore throat and trouble swallowing.    Eyes:  Negative for pain, discharge and visual disturbance.   Respiratory:   Negative for apnea, cough, chest tightness, wheezing and stridor.    Cardiovascular:  Positive for leg swelling. Negative for palpitations.   Gastrointestinal:  Negative for abdominal distention, abdominal pain, blood in stool, constipation, diarrhea, nausea and vomiting.   Endocrine: Negative for cold intolerance and heat intolerance.   Genitourinary:  Negative for difficulty urinating, dysuria, flank pain, frequency and urgency.   Musculoskeletal:  Negative for arthralgias, back pain, joint swelling, myalgias, neck pain and neck stiffness.   Skin:  Negative for rash and wound.   Allergic/Immunologic: Negative for food allergies and immunocompromised state.   Neurological:  Positive for weakness. Negative for dizziness, seizures, syncope, facial asymmetry, light-headedness and headaches.   Hematological:  Negative for adenopathy.   Psychiatric/Behavioral:  Negative for agitation, behavioral problems and confusion. The patient is not nervous/anxious      Objective:     Vital Signs (Most Recent):  Temp: 98.1 °F (36.7 °C) (02/09/24 1125)  Pulse: 79 (02/09/24 1125)  Resp: 18 (02/09/24 1125)  BP: 108/69 (02/09/24 1125)  SpO2: (!) 94 % (02/09/24 1125) Vital Signs (24h Range):  Temp:  [96.9 °F (36.1 °C)-98.3 °F (36.8 °C)] 98.1 °F (36.7 °C)  Pulse:  [74-92] 79  Resp:  [12-25] 18  SpO2:  [93 %-98 %] 94 %  BP: (108-152)/() 108/69     Weight: 120.4 kg (265 lb 6.9 oz)  Body mass index is 35.02 kg/m².    Intake/Output Summary (Last 24 hours) at 2/9/2024 1246  Last data filed at 2/9/2024 0511  Gross per 24 hour   Intake 120 ml   Output 2300 ml   Net -2180 ml         Physical Exam        Constitutional:       General: He is not in acute distress.      Appearance: He is well-developed. He is not diaphoretic.   HENT:      Head: Normocephalic and atraumatic.      Nose: Nose normal.   Eyes:      General: No scleral icterus.     Conjunctiva/sclera: Conjunctivae normal.   Cardiovascular:      Rate and Rhythm: Normal rate and regular rhythm.      Heart sounds: Normal heart sounds. No murmur heard.     No friction rub. No gallop.   Pulmonary:      Effort: . No respiratory distress.      Breath sounds: No stridor. Rales present. No wheezing.   Chest:      Chest wall: No tenderness.   Abdominal:      General: Bowel sounds are normal. There is no distension.      Palpations: Abdomen is soft.      Tenderness: There is no abdominal tenderness. There is no guarding or rebound.   Musculoskeletal:         General: No tenderness or deformity. Normal range of motion.      Cervical back: Normal range of motion and neck supple.      Right lower leg: Edema (+2) present.      Left lower leg: Edema (+2) present.   Skin:     General: Skin is warm and dry.      Coloration: Skin is not pale.      Findings: No erythema or rash.   Neurological:      Mental Status: He is alert and oriented to person, place, and time.      Cranial Nerves: No cranial nerve deficit.      Motor: No abnormal muscle tone.      Coordination: Coordination normal.      Deep Tendon Reflexes: Reflexes are normal and symmetric.   Psychiatric:         Behavior: Behavior normal.         Thought Content: Thought content normal.      Significant Labs: All pertinent labs within the past 24 hours have been reviewed.  CBC:   Recent Labs   Lab 02/09/24  0609   WBC 3.08*   HGB 16.5   HCT 49.1        CMP:   Recent Labs   Lab 02/09/24  0609      K 3.3*      CO2 27      BUN 12   CREATININE 1.0   CALCIUM 9.2   ANIONGAP 15       Significant Imaging:     Imaging Results              X-Ray Chest AP Portable (Final result)  Result time 02/06/24 08:02:42      Final result by Shahid Louise MD (02/06/24  08:02:42)                   Impression:      As above.      Electronically signed by: Shahid Louise  Date:    02/06/2024  Time:    08:02               Narrative:    EXAMINATION:  XR CHEST AP PORTABLE    CLINICAL HISTORY:  Chest Pain;.    TECHNIQUE:  Single frontal portable view of the chest was performed.    COMPARISON:  None    FINDINGS:  Enlarged cardiomediastinal silhouette presumed related to cardiomegaly.  Pericardial effusion not excluded.  Pulmonary vascular congestion with mild interstitial edema.  Ill-defined opacification left lung base obscured by the heart border.  Left pleural effusion and left retrocardiac airspace disease not excluded.  No pneumothorax.                                       Assessment/Plan:      * Acute on chronic combined systolic and diastolic congestive heart failure  Patient is identified as having Combined Systolic and Diastolic heart failure that is Acute on chronic. CHF is currently uncontrolled due to Continued edema of extremities and JVD, Rales/crackles on pulmonary exam, and Pulmonary edema/pleural effusion on CXR. Latest ECHO performed and demonstrates- No results found for this or any previous visit.  . Continue Beta Blocker, ACE/ARB, and Furosemide and monitor clinical status closely. Monitor on telemetry. Patient is on CHF pathway.  Monitor strict Is&Os and daily weights.  Place on fluid restriction of 1.5 L. Cardiology has been consulted. Continue to stress to patient importance of self efficacy and  on diet for CHF. Last BNP reviewed- and noted below   Recent Labs   Lab 02/06/24  0136   BNP 1,238*     IV Lasix  Echo   Cardiology consult     Stroke  Hx ischemic stroke with right sided weakness and visual deficits 202- mostly resolved   SAH 1/2023  Cont ASA, Plavix, Statin     CAD (coronary artery disease)  Patient with known CAD s/p  NONE , which is uncontrolled Will continue  BB, ASA, Plavix, and Statin and monitor for S/Sx of angina/ACS. Continue to monitor on  "telemetry.     Hypertension  Chronic, uncontrolled. Latest blood pressure and vitals reviewed-     Temp:  [97.6 °F (36.4 °C)]   Pulse:  [80-91]   Resp:  [17-26]   BP: (135-163)/()   SpO2:  [95 %-100 %] .   Home meds for hypertension were reviewed and noted below.   Hypertension Medications               amLODIPine (NORVASC) 2.5 MG tablet Take 1 tablet by mouth once daily.    carvediloL (COREG) 6.25 MG tablet Take 1 tablet by mouth 2 (two) times daily.    lisinopriL (PRINIVIL,ZESTRIL) 40 MG tablet Take 1 tablet by mouth once daily.            While in the hospital, will manage blood pressure as follows; Continue home antihypertensive regimen    Will utilize p.r.n. blood pressure medication only if patient's blood pressure greater than 140/90 and he develops symptoms such as worsening chest pain or shortness of breath.    Type 2 diabetes mellitus, without long-term current use of insulin  Patient's FSGs are uncontrolled due to hyperglycemia on current medication regimen.  Last A1c reviewed-   Lab Results   Component Value Date    HGBA1C 6.8 (H) 02/02/2023     Most recent fingerstick glucose reviewed- No results for input(s): "POCTGLUCOSE" in the last 24 hours.  Current correctional scale  Low  Maintain anti-hyperglycemic dose as follows-   Antihyperglycemics (From admission, onward)      None          Hold Oral hypoglycemics while patient is in the hospital.       Elevated troponin  Serial troponin   Cont ASA, BB, Plavix, Statin, ACE  Echo   Consult cardiology      Carotid stenosis, asymptomatic, bilateral  Followed closely by vascular surgery  Internal carotid artery dissection: right ICA dissected and is 100% occluded nothing to do surgically to correct this. No major deficits since the angiogram. We will follow his left carotid disease with yearly duplex.         VTE Risk Mitigation (From admission, onward)           Ordered     heparin (porcine) injection 5,000 Units  Every 8 hours         02/06/24 0638     " IP VTE HIGH RISK PATIENT  Once         02/06/24 0638     Place sequential compression device  Until discontinued         02/06/24 0638                    Discharge Planning   ANNE-MARIE:      Code Status: Full Code   Is the patient medically ready for discharge?:     Reason for patient still in hospital (select all that apply): Patient trending condition, Consult recommendations, and Pending disposition  Discharge Plan A: Home with family                  Miksosa Davis Hernandez MD  Department of Hospital Medicine   [facility] [facility] ([facility])

## 2024-02-09 NOTE — ASSESSMENT & PLAN NOTE
Cont asa, statin, BB, plavix  R/LHC once compensated    2/7/24  R/LHC planned for tomorrow    2/9/24  Asa, statin, BB, plavix  LHC nonobs EF 25%, mod pulm HTN

## 2024-02-09 NOTE — PLAN OF CARE
A204/A204 ROGE Matthew is a 68 y.o.male admitted on 2/6/2024 for Acute on chronic combined systolic and diastolic congestive heart failure   Code Status: Full Code MRN: [id number]   Review of patient's allergies indicates:   Allergen Reactions    Statins-hmg-coa reductase inhibitors Other (See Comments)     Past Medical History:   Diagnosis Date    CAD (coronary artery disease)     Carotid artery dissection     CHF (congestive heart failure)     Gout, unspecified     HLD (hyperlipidemia)     HTN (hypertension)     Hyperparathyroidism, unspecified     JAMES (obstructive sleep apnea)     Prostate cancer     SAH (subarachnoid hemorrhage) 01/2023    Stroke 2020    Type 2 diabetes mellitus without complications       PRN meds    acetaminophen, 650 mg, Q4H PRN  labetaloL, 10 mg, Q4H PRN  ondansetron, 8 mg, Q8H PRN  ondansetron, 4 mg, Q8H PRN  sodium chloride 0.9%, 10 mL, PRN      Chart check completed. Will continue plan of care.      Orientation: oriented x 4  [place] Coma Scale Score: 15     Lead Monitored: Lead II Rhythm: normal sinus rhythm Frequency/Ectopy: PVCs  Cardiac/Telemetry Box Number: [id number]  VTE Required Core Measure: Pharmacological prophylaxis initiated/maintained Last Bowel Movement: 02/07/24  Diet Cardiac Standard Tray     Lenny Score: 23  Fall Risk Score: 6  Accucheck [x]   Freq? ACHS     Lines/Drains/Airways       Peripheral Intravenous Line  Duration                  Peripheral IV - Single Lumen 02/06/24 2340 20 G Posterior;Right Hand 2 days

## 2024-02-09 NOTE — PLAN OF CARE
Report called to Rito RN, pt to be transferred to  204 per bed, VS stable /NADN/ L radial and L brachial dsgs CDI/ no bleeding noted / L Radial immobilizer intact / SR up x 2

## 2024-02-09 NOTE — ASSESSMENT & PLAN NOTE
BNP elevated  Echo with 25-30% EF  Cont OMT IV diuresis, coreg, lisinopril  Recommend R/LHC once compensated  Will plan on switching to entresto in future  Strict I/Os  Low Na diet    2/7/24  R/LHC planned for tomorrow  All risks, benefits and treatment alternatives explained all questions answered pt agreeable to proceed   NPO mn    2.8.2024  Appears to be euvolemic today.    No prior ischemic workup.    Going for right and left heart catheterization today for severe cardiomyopathy.    2/9/24  R/LHC yesterday nonobstructive, EF 25%, moderate pulm HTN  Cont OMT BB, entresto, lasix  Strict I/Os  LifeVest ordered  F/u in clinic

## 2024-02-09 NOTE — SUBJECTIVE & OBJECTIVE
Review of Systems   Constitutional: Negative.   HENT: Negative.     Eyes: Negative.    Cardiovascular: Negative.    Respiratory: Negative.     Skin: Negative.    Musculoskeletal: Negative.    Gastrointestinal: Negative.    Genitourinary: Negative.    Neurological: Negative.    Psychiatric/Behavioral: Negative.       Objective:     Vital Signs (Most Recent):  Temp: 96.9 °F (36.1 °C) (02/09/24 0800)  Pulse: 74 (02/09/24 0800)  Resp: 18 (02/09/24 0800)  BP: 136/88 (02/09/24 0800)  SpO2: 95 % (02/09/24 0800) Vital Signs (24h Range):  Temp:  [96.9 °F (36.1 °C)-98.3 °F (36.8 °C)] 96.9 °F (36.1 °C)  Pulse:  [74-92] 74  Resp:  [12-25] 18  SpO2:  [93 %-98 %] 95 %  BP: (125-152)/() 136/88     Weight: 120.4 kg (265 lb 6.9 oz)  Body mass index is 35.02 kg/m².     SpO2: 95 %         Intake/Output Summary (Last 24 hours) at 2/9/2024 1038  Last data filed at 2/9/2024 0511  Gross per 24 hour   Intake 120 ml   Output 2600 ml   Net -2480 ml       Lines/Drains/Airways       Peripheral Intravenous Line  Duration                  Peripheral IV - Single Lumen 02/06/24 2340 20 G Posterior;Right Hand 2 days                       Physical Exam  Vitals and nursing note reviewed.   Constitutional:       Appearance: Normal appearance.   HENT:      Head: Normocephalic and atraumatic.   Eyes:      General:         Right eye: No discharge.         Left eye: No discharge.      Pupils: Pupils are equal, round, and reactive to light.   Cardiovascular:      Rate and Rhythm: Normal rate and regular rhythm.      Heart sounds: S1 normal and S2 normal. No murmur heard.     No friction rub.   Pulmonary:      Effort: Pulmonary effort is normal. No respiratory distress.      Breath sounds: Normal breath sounds. No rales.   Abdominal:      Palpations: Abdomen is soft.      Tenderness: There is no abdominal tenderness.   Musculoskeletal:      Cervical back: Neck supple.      Right lower leg: Edema present.      Left lower leg: Edema present.       "Comments: improving   Skin:     General: Skin is warm and dry.   Neurological:      General: No focal deficit present.      Mental Status: He is alert and oriented to person, place, and time.   Psychiatric:         Mood and Affect: Mood normal.         Behavior: Behavior normal.         Thought Content: Thought content normal.            Significant Labs: BMP:   Recent Labs   Lab 02/09/24  0609         K 3.3*      CO2 27   BUN 12   CREATININE 1.0   CALCIUM 9.2   , CMP   Recent Labs   Lab 02/09/24  0609      K 3.3*      CO2 27      BUN 12   CREATININE 1.0   CALCIUM 9.2   ANIONGAP 15   , CBC   Recent Labs   Lab 02/09/24  0609   WBC 3.08*   HGB 16.5   HCT 49.1      , INR No results for input(s): "INR", "PROTIME" in the last 48 hours., Lipid Panel No results for input(s): "CHOL", "HDL", "LDLCALC", "TRIG", "CHOLHDL" in the last 48 hours., Troponin No results for input(s): "TROPONINI" in the last 48 hours., and All pertinent lab results from the last 24 hours have been reviewed.    Significant Imaging: Cardiac Cath: reviewed, Echocardiogram: Transthoracic echo (TTE) complete (Cupid Only):   Results for orders placed or performed during the hospital encounter of 02/06/24   Echo   Result Value Ref Range    BSA 2.69 m2    Greene's Biplane MOD Ejection Fraction 31 %    LVOT stroke volume 45.85 cm3    LVIDd 6.07 (A) 3.5 - 6.0 cm    LV Systolic Volume 150.23 mL    LV Systolic Volume Index 58.0 mL/m2    LVIDs 5.55 (A) 2.1 - 4.0 cm    LV Diastolic Volume 184.97 mL    LV Diastolic Volume Index 71.42 mL/m2    IVS 1.52 (A) 0.6 - 1.1 cm    LVOT diameter 2.08 cm    LVOT area 3.4 cm2    FS 9 (A) 28 - 44 %    Left Ventricle Relative Wall Thickness 0.50 cm    Posterior Wall 1.52 (A) 0.6 - 1.1 cm    LV mass 443.56 g    LV Mass Index 171 g/m2    MV Peak E Simon 0.77 m/s    TDI LATERAL 0.09 m/s    TDI SEPTAL 0.07 m/s    E/E' ratio 9.63 m/s    MV Peak A Simon 0.31 m/s    TR Max Simon 2.82 m/s    E/A " ratio 2.48     IVRT 60.89 msec    E wave deceleration time 119.06 msec    LV SEPTAL E/E' RATIO 11.00 m/s    LV LATERAL E/E' RATIO 8.56 m/s    LVOT peak simon 0.73 m/s    Left Ventricular Outflow Tract Mean Velocity 0.63 cm/s    Left Ventricular Outflow Tract Mean Gradient 1.63 mmHg    RVDD 2.96 cm    RVOT peak VTI 7.1 cm    TAPSE 1.64 cm    LA size 4.93 cm    Left Atrium Minor Axis 5.85 cm    Left Atrium Major Axis 6.08 cm    RA Major Axis 5.15 cm    AV regurgitation pressure 1/2 time [id number] ms    AR Max Simon 1.88 m/s    AV mean gradient 5 mmHg    AV peak gradient 8 mmHg    Ao peak simon 1.37 m/s    Ao VTI 25.10 cm    LVOT peak VTI 13.50 cm    AV valve area 1.83 cm²    AV Velocity Ratio 0.53     AV index (prosthetic) 0.54     GINGER by Velocity Ratio 1.81 cm²    Mr max simon 3.61 m/s    MV stenosis pressure 1/2 time 34.53 ms    MV valve area p 1/2 method 6.37 cm2    TV mean gradient 32 mmHg    Triscuspid Valve Regurgitation Peak Gradient 32 mmHg    PV mean gradient 1 mmHg    RVOT peak simon 0.49 m/s    Ao root annulus 3.49 cm    STJ 3.66 cm    Ascending aorta 3.62 cm    IVC diameter 2.65 cm    Mean e' 0.08 m/s    ZLVIDS -5.06     ZLVIDD -10.82     LA Volume Index 42.4 mL/m2    LA volume 109.94 cm3    LA WIDTH 4.4 cm    RA Width 3.3 cm    TV resting pulmonary artery pressure 47 mmHg    RV TB RVSP 18 mmHg    Est. RA pres 15 mmHg    Narrative      Left Ventricle: The left ventricle is moderately dilated. Normal wall   thickness. Severe global hypokinesis present. There is severely reduced   systolic function with a visually estimated ejection fraction of 25 - 30%.   Grade II diastolic dysfunction.    Right Ventricle: Normal right ventricular cavity size. Wall thickness   is normal. Right ventricle wall motion  is normal. Systolic function is   mildly reduced.    Left Atrium: Left atrium is moderately dilated.    Right Atrium: Right atrium is mildly dilated.    Aortic Valve: There is mild aortic regurgitation.     Mitral Valve: There is mild regurgitation.    Tricuspid Valve: There is mild regurgitation.    Pulmonary Artery: The estimated pulmonary artery systolic pressure is   47 mmHg.    IVC/SVC: Elevated venous pressure at 15 mmHg.     , EKG: reviewed, Stress Test: reviewed, and X-Ray: CXR: X-Ray Chest 1 View (CXR): No results found for this visit on 02/06/24.

## 2024-02-09 NOTE — ASSESSMENT & PLAN NOTE
Troponin flat, likely demand  Echo with low EF  Recommend R/LHC once compensated  Cont ASA, statin, BB    2/7/24  R/LHC tomorrow  Npo mn    2/8/24  S/p LHC nonobstrctive, EF 25%

## 2024-02-09 NOTE — PLAN OF CARE
Pt transferred to  per Bed,sr up x2, wife with pt, NADN, handoff to Dayo,BIRD, L Radial and L brachial dsg CDI/ L Radial immobilizer intact/ call light with in reach , bed low and locked

## 2024-02-09 NOTE — SUBJECTIVE & OBJECTIVE
Interval History:     No acute events overnight   Alert and oriented x3   Hemodynamically stable   Still with lower extremity swelling, we will continue IV diuretics   Cardiology recommended LifeVest,  worked on arrangements   Likely LifeVest fitting tomorrow, anticipate discharge in next 24-48 hours       Review of Systems      Constitutional:  Positive for fatigue. Negative for appetite change, chills, diaphoresis and fever.   HENT:  Negative for congestion, nosebleeds, sore throat and trouble swallowing.    Eyes:  Negative for pain, discharge and visual disturbance.   Respiratory:   Negative for apnea, cough, chest tightness, wheezing and stridor.    Cardiovascular:  Positive for leg swelling. Negative for palpitations.   Gastrointestinal:  Negative for abdominal distention, abdominal pain, blood in stool, constipation, diarrhea, nausea and vomiting.   Endocrine: Negative for cold intolerance and heat intolerance.   Genitourinary:  Negative for difficulty urinating, dysuria, flank pain, frequency and urgency.   Musculoskeletal:  Negative for arthralgias, back pain, joint swelling, myalgias, neck pain and neck stiffness.   Skin:  Negative for rash and wound.   Allergic/Immunologic: Negative for food allergies and immunocompromised state.   Neurological:  Positive for weakness. Negative for dizziness, seizures, syncope, facial asymmetry, light-headedness and headaches.   Hematological:  Negative for adenopathy.   Psychiatric/Behavioral:  Negative for agitation, behavioral problems and confusion. The patient is not nervous/anxious      Objective:     Vital Signs (Most Recent):  Temp: 98.1 °F (36.7 °C) (02/09/24 1125)  Pulse: 79 (02/09/24 1125)  Resp: 18 (02/09/24 1125)  BP: 108/69 (02/09/24 1125)  SpO2: (!) 94 % (02/09/24 1125) Vital Signs (24h Range):  Temp:  [96.9 °F (36.1 °C)-98.3 °F (36.8 °C)] 98.1 °F (36.7 °C)  Pulse:  [74-92] 79  Resp:  [12-25] 18  SpO2:  [93 %-98 %] 94 %  BP: (108-152)/()  108/69     Weight: 120.4 kg (265 lb 6.9 oz)  Body mass index is 35.02 kg/m².    Intake/Output Summary (Last 24 hours) at 2/9/2024 1246  Last data filed at 2/9/2024 0511  Gross per 24 hour   Intake 120 ml   Output 2300 ml   Net -2180 ml         Physical Exam        Constitutional:       General: He is not in acute distress.     Appearance: He is well-developed. He is not diaphoretic.   HENT:      Head: Normocephalic and atraumatic.      Nose: Nose normal.   Eyes:      General: No scleral icterus.     Conjunctiva/sclera: Conjunctivae normal.   Cardiovascular:      Rate and Rhythm: Normal rate and regular rhythm.      Heart sounds: Normal heart sounds. No murmur heard.     No friction rub. No gallop.   Pulmonary:      Effort: . No respiratory distress.      Breath sounds: No stridor. Rales present. No wheezing.   Chest:      Chest wall: No tenderness.   Abdominal:      General: Bowel sounds are normal. There is no distension.      Palpations: Abdomen is soft.      Tenderness: There is no abdominal tenderness. There is no guarding or rebound.   Musculoskeletal:         General: No tenderness or deformity. Normal range of motion.      Cervical back: Normal range of motion and neck supple.      Right lower leg: Edema (+2) present.      Left lower leg: Edema (+2) present.   Skin:     General: Skin is warm and dry.      Coloration: Skin is not pale.      Findings: No erythema or rash.   Neurological:      Mental Status: He is alert and oriented to person, place, and time.      Cranial Nerves: No cranial nerve deficit.      Motor: No abnormal muscle tone.      Coordination: Coordination normal.      Deep Tendon Reflexes: Reflexes are normal and symmetric.   Psychiatric:         Behavior: Behavior normal.         Thought Content: Thought content normal.      Significant Labs: All pertinent labs within the past 24 hours have been reviewed.  CBC:   Recent Labs   Lab 02/09/24  0609   WBC 3.08*   HGB 16.5   HCT 49.1         CMP:   Recent Labs   Lab 02/09/24  0609      K 3.3*      CO2 27      BUN 12   CREATININE 1.0   CALCIUM 9.2   ANIONGAP 15       Significant Imaging:     Imaging Results              X-Ray Chest AP Portable (Final result)  Result time 02/06/24 08:02:42      Final result by Shahid Louise MD (02/06/24 08:02:42)                   Impression:      As above.      Electronically signed by: Shahid Louise  Date:    02/06/2024  Time:    08:02               Narrative:    EXAMINATION:  XR CHEST AP PORTABLE    CLINICAL HISTORY:  Chest Pain;.    TECHNIQUE:  Single frontal portable view of the chest was performed.    COMPARISON:  None    FINDINGS:  Enlarged cardiomediastinal silhouette presumed related to cardiomegaly.  Pericardial effusion not excluded.  Pulmonary vascular congestion with mild interstitial edema.  Ill-defined opacification left lung base obscured by the heart border.  Left pleural effusion and left retrocardiac airspace disease not excluded.  No pneumothorax.

## 2024-02-10 VITALS
RESPIRATION RATE: 20 BRPM | OXYGEN SATURATION: 97 % | HEART RATE: 70 BPM | HEIGHT: 73 IN | WEIGHT: 258.63 LBS | SYSTOLIC BLOOD PRESSURE: 102 MMHG | DIASTOLIC BLOOD PRESSURE: 59 MMHG | TEMPERATURE: 98 F | BODY MASS INDEX: 34.28 KG/M2

## 2024-02-10 LAB
ANION GAP SERPL CALC-SCNC: 8 MMOL/L (ref 8–16)
BASOPHILS # BLD AUTO: 0.02 K/UL (ref 0–0.2)
BASOPHILS NFR BLD: 0.5 % (ref 0–1.9)
BUN SERPL-MCNC: 15 MG/DL (ref 8–23)
CALCIUM SERPL-MCNC: 9.2 MG/DL (ref 8.7–10.5)
CHLORIDE SERPL-SCNC: 98 MMOL/L (ref 95–110)
CO2 SERPL-SCNC: 28 MMOL/L (ref 23–29)
CREAT SERPL-MCNC: 1 MG/DL (ref 0.5–1.4)
DIFFERENTIAL METHOD BLD: ABNORMAL
EOSINOPHIL # BLD AUTO: 0.2 K/UL (ref 0–0.5)
EOSINOPHIL NFR BLD: 4 % (ref 0–8)
ERYTHROCYTE [DISTWIDTH] IN BLOOD BY AUTOMATED COUNT: 17.5 % (ref 11.5–14.5)
EST. GFR  (NO RACE VARIABLE): >60 ML/MIN/1.73 M^2
GLUCOSE SERPL-MCNC: 139 MG/DL (ref 70–110)
HCT VFR BLD AUTO: 51.8 % (ref 40–54)
HGB BLD-MCNC: 17.4 G/DL (ref 14–18)
IMM GRANULOCYTES # BLD AUTO: 0.01 K/UL (ref 0–0.04)
IMM GRANULOCYTES NFR BLD AUTO: 0.3 % (ref 0–0.5)
LYMPHOCYTES # BLD AUTO: 1 K/UL (ref 1–4.8)
LYMPHOCYTES NFR BLD: 26.5 % (ref 18–48)
MCH RBC QN AUTO: 29.2 PG (ref 27–31)
MCHC RBC AUTO-ENTMCNC: 33.6 G/DL (ref 32–36)
MCV RBC AUTO: 87 FL (ref 82–98)
MONOCYTES # BLD AUTO: 0.6 K/UL (ref 0.3–1)
MONOCYTES NFR BLD: 14.6 % (ref 4–15)
NEUTROPHILS # BLD AUTO: 2.1 K/UL (ref 1.8–7.7)
NEUTROPHILS NFR BLD: 54.1 % (ref 38–73)
NRBC BLD-RTO: 0 /100 WBC
PLATELET # BLD AUTO: 190 K/UL (ref 150–450)
PMV BLD AUTO: 10.9 FL (ref 9.2–12.9)
POCT GLUCOSE: 143 MG/DL (ref 70–110)
POCT GLUCOSE: 148 MG/DL (ref 70–110)
POCT GLUCOSE: 201 MG/DL (ref 70–110)
POTASSIUM SERPL-SCNC: 3.3 MMOL/L (ref 3.5–5.1)
RBC # BLD AUTO: 5.96 M/UL (ref 4.6–6.2)
SODIUM SERPL-SCNC: 134 MMOL/L (ref 136–145)
WBC # BLD AUTO: 3.78 K/UL (ref 3.9–12.7)

## 2024-02-10 PROCEDURE — 85025 COMPLETE CBC W/AUTO DIFF WBC: CPT | Performed by: STUDENT IN AN ORGANIZED HEALTH CARE EDUCATION/TRAINING PROGRAM

## 2024-02-10 PROCEDURE — 99232 SBSQ HOSP IP/OBS MODERATE 35: CPT | Mod: ,,, | Performed by: INTERNAL MEDICINE

## 2024-02-10 PROCEDURE — 25000003 PHARM REV CODE 250: Performed by: STUDENT IN AN ORGANIZED HEALTH CARE EDUCATION/TRAINING PROGRAM

## 2024-02-10 PROCEDURE — 36415 COLL VENOUS BLD VENIPUNCTURE: CPT | Performed by: STUDENT IN AN ORGANIZED HEALTH CARE EDUCATION/TRAINING PROGRAM

## 2024-02-10 PROCEDURE — 63600175 PHARM REV CODE 636 W HCPCS: Performed by: NURSE PRACTITIONER

## 2024-02-10 PROCEDURE — 25000003 PHARM REV CODE 250: Performed by: NURSE PRACTITIONER

## 2024-02-10 PROCEDURE — 80048 BASIC METABOLIC PNL TOTAL CA: CPT | Performed by: STUDENT IN AN ORGANIZED HEALTH CARE EDUCATION/TRAINING PROGRAM

## 2024-02-10 PROCEDURE — 25000003 PHARM REV CODE 250: Performed by: INTERNAL MEDICINE

## 2024-02-10 RX ORDER — CARVEDILOL 12.5 MG/1
12.5 TABLET ORAL 2 TIMES DAILY
Qty: 60 TABLET | Refills: 0 | Status: ON HOLD | OUTPATIENT
Start: 2024-02-10 | End: 2024-02-13 | Stop reason: HOSPADM

## 2024-02-10 RX ORDER — AMLODIPINE BESYLATE 2.5 MG/1
2.5 TABLET ORAL DAILY
Qty: 30 TABLET | Refills: 0 | Status: ON HOLD | OUTPATIENT
Start: 2024-02-10 | End: 2024-02-13 | Stop reason: HOSPADM

## 2024-02-10 RX ORDER — CLOPIDOGREL BISULFATE 75 MG/1
75 TABLET ORAL DAILY
Qty: 30 TABLET | Refills: 0 | Status: SHIPPED | OUTPATIENT
Start: 2024-02-10 | End: 2024-02-10

## 2024-02-10 RX ORDER — FUROSEMIDE 40 MG/1
40 TABLET ORAL DAILY
Qty: 30 TABLET | Refills: 0 | Status: SHIPPED | OUTPATIENT
Start: 2024-02-10 | End: 2024-02-10

## 2024-02-10 RX ORDER — CARVEDILOL 12.5 MG/1
12.5 TABLET ORAL 2 TIMES DAILY
Qty: 60 TABLET | Refills: 0 | Status: SHIPPED | OUTPATIENT
Start: 2024-02-10 | End: 2024-02-10

## 2024-02-10 RX ORDER — FUROSEMIDE 40 MG/1
40 TABLET ORAL DAILY
Qty: 30 TABLET | Refills: 0 | Status: ON HOLD | OUTPATIENT
Start: 2024-02-10 | End: 2024-02-13

## 2024-02-10 RX ORDER — POTASSIUM CHLORIDE 20 MEQ/1
40 TABLET, EXTENDED RELEASE ORAL
Status: COMPLETED | OUTPATIENT
Start: 2024-02-10 | End: 2024-02-10

## 2024-02-10 RX ORDER — AMLODIPINE BESYLATE 2.5 MG/1
2.5 TABLET ORAL DAILY
Qty: 30 TABLET | Refills: 0 | Status: SHIPPED | OUTPATIENT
Start: 2024-02-10 | End: 2024-02-10

## 2024-02-10 RX ORDER — CLOPIDOGREL BISULFATE 75 MG/1
75 TABLET ORAL DAILY
Qty: 30 TABLET | Refills: 0 | Status: SHIPPED | OUTPATIENT
Start: 2024-02-10 | End: 2024-03-28

## 2024-02-10 RX ADMIN — METOLAZONE 5 MG: 5 TABLET ORAL at 09:02

## 2024-02-10 RX ADMIN — SENNOSIDES AND DOCUSATE SODIUM 1 TABLET: 8.6; 5 TABLET ORAL at 09:02

## 2024-02-10 RX ADMIN — AMLODIPINE BESYLATE 2.5 MG: 2.5 TABLET ORAL at 09:02

## 2024-02-10 RX ADMIN — SACUBITRIL AND VALSARTAN 1 TABLET: 49; 51 TABLET, FILM COATED ORAL at 09:02

## 2024-02-10 RX ADMIN — ASPIRIN 81 MG CHEWABLE TABLET 81 MG: 81 TABLET CHEWABLE at 09:02

## 2024-02-10 RX ADMIN — LEVOTHYROXINE SODIUM 25 MCG: 0.03 TABLET ORAL at 06:02

## 2024-02-10 RX ADMIN — HEPARIN SODIUM 5000 UNITS: 5000 INJECTION INTRAVENOUS; SUBCUTANEOUS at 06:02

## 2024-02-10 RX ADMIN — CARVEDILOL 12.5 MG: 12.5 TABLET, FILM COATED ORAL at 09:02

## 2024-02-10 RX ADMIN — FUROSEMIDE 40 MG: 10 INJECTION, SOLUTION INTRAMUSCULAR; INTRAVENOUS at 09:02

## 2024-02-10 RX ADMIN — POTASSIUM CHLORIDE 40 MEQ: 1500 TABLET, EXTENDED RELEASE ORAL at 10:02

## 2024-02-10 RX ADMIN — CLOPIDOGREL BISULFATE 75 MG: 75 TABLET ORAL at 09:02

## 2024-02-10 RX ADMIN — POTASSIUM CHLORIDE 40 MEQ: 1500 TABLET, EXTENDED RELEASE ORAL at 09:02

## 2024-02-10 RX ADMIN — HEPARIN SODIUM 5000 UNITS: 5000 INJECTION INTRAVENOUS; SUBCUTANEOUS at 02:02

## 2024-02-10 NOTE — PROGRESS NOTES
Discharge education and instructions reviewed with patient. Questions answered as of now, LDA's and Telemetry monitor removed per provider order. Patient remained free of falls during shift. Discharge medications delivered to bedside per pharmacy. Transport requested, patient discharged with personal belongings via wheelchair.

## 2024-02-10 NOTE — PLAN OF CARE
Confirmed with [facility] liaison that plan is for patient to be fitted for lifevest before noon today.

## 2024-02-10 NOTE — SUBJECTIVE & OBJECTIVE
Review of Systems   Cardiovascular:  Negative for chest pain, dyspnea on exertion, palpitations and syncope.   Genitourinary: Negative.    Neurological: Negative.      Objective:     Vital Signs (Most Recent):  Temp: 98.1 °F (36.7 °C) (02/10/24 1042)  Pulse: 67 (02/10/24 1042)  Resp: 18 (02/10/24 1042)  BP: (!) 114/56 (02/10/24 1042)  SpO2: 96 % (02/10/24 1042) Vital Signs (24h Range):  Temp:  [97.8 °F (36.6 °C)-99.4 °F (37.4 °C)] 98.1 °F (36.7 °C)  Pulse:  [60-95] 67  Resp:  [18] 18  SpO2:  [92 %-96 %] 96 %  BP: (102-131)/(56-75) 114/56     Weight: 117.3 kg (258 lb 9.6 oz)  Body mass index is 34.12 kg/m².     SpO2: 96 %         Intake/Output Summary (Last 24 hours) at 2/10/2024 1253  Last data filed at 2/10/2024 0433  Gross per 24 hour   Intake --   Output 1450 ml   Net -1450 ml       Lines/Drains/Airways       Peripheral Intravenous Line  Duration                  Peripheral IV - Single Lumen 02/06/24 2340 20 G Posterior;Right Hand 3 days                       Physical Exam       Significant Labs: All pertinent lab results from the last 24 hours have been reviewed. and   Recent Lab Results  (Last 5 results in the past 24 hours)        02/10/24  1044   02/10/24  0536   02/10/24  0502   02/09/24  1917   02/09/24  1548        Anion Gap     8           Baso #     0.02           Basophil %     0.5           BUN     15           Calcium     9.2           Chloride     98           CO2     28           Creatinine     1.0           Differential Method     Automated           eGFR     >60           Eos #     0.2           Eos %     4.0           Glucose     139           Gran # (ANC)     2.1           Gran %     54.1           Hematocrit     51.8           Hemoglobin     17.4           Immature Grans (Abs)     0.01  Comment: Mild elevation in immature granulocytes is non specific and   can be seen in a variety of conditions including stress response,   acute inflammation, trauma and pregnancy. Correlation with other    laboratory and clinical findings is essential.             Immature Granulocytes     0.3           Lymph #     1.0           Lymph %     26.5           MCH     29.2           MCHC     33.6           MCV     87           Mono #     0.6           Mono %     14.6           MPV     10.9           nRBC     0           Platelet Count     190           POCT Glucose 201   143     158   168       Potassium     3.3           RBC     5.96           RDW     17.5           Sodium     134           WBC     3.78

## 2024-02-10 NOTE — PROGRESS NOTES
[facility] - Telemetry ([facility])  Cardiology  Progress Note    Patient Name: Ron Matthew  MRN: [id number]  Admission Date: 2/6/2024  Hospital Length of Stay: 4 days  Code Status: Full Code   Attending Physician: Heidi Hernandez,*   Primary Care Physician: Cheri Dietz  Expected Discharge Date: 2/10/2024  Principal Problem:Acute on chronic combined systolic and diastolic congestive heart failure    Subjective:     Hospital Course:   2/7/24 Pt seen and examined today, feels ok reports his SOB is improving, denies any CP at this time. LE edema improving. Labs reviewed, chart reviewed  2.8.2024 complained of left-sided chest pain earlier today, however not retrosternal.     2/9/24 pt seen and examined today s/p LHC nonobstructive disease EF noted to be 25% on angiogram. Discussed LifeVest today with pt, pt agreeable. Labs reviewed, chart reviewed    2.10.2024 doing well.  Tolerated Entresto.  Blood pressure better controlled.    Access site is normal.            Review of Systems   Cardiovascular:  Negative for chest pain, dyspnea on exertion, palpitations and syncope.   Genitourinary: Negative.    Neurological: Negative.      Objective:     Vital Signs (Most Recent):  Temp: 98.1 °F (36.7 °C) (02/10/24 1042)  Pulse: 67 (02/10/24 1042)  Resp: 18 (02/10/24 1042)  BP: (!) 114/56 (02/10/24 1042)  SpO2: 96 % (02/10/24 1042) Vital Signs (24h Range):  Temp:  [97.8 °F (36.6 °C)-99.4 °F (37.4 °C)] 98.1 °F (36.7 °C)  Pulse:  [60-95] 67  Resp:  [18] 18  SpO2:  [92 %-96 %] 96 %  BP: (102-131)/(56-75) 114/56     Weight: 117.3 kg (258 lb 9.6 oz)  Body mass index is 34.12 kg/m².     SpO2: 96 %         Intake/Output Summary (Last 24 hours) at 2/10/2024 1253  Last data filed at 2/10/2024 0433  Gross per 24 hour   Intake --   Output 1450 ml   Net -1450 ml       Lines/Drains/Airways       Peripheral Intravenous Line  Duration                  Peripheral IV - Single Lumen 02/06/24 2340 20 G Posterior;Right Hand 3  days                       Physical Exam       Significant Labs: All pertinent lab results from the last 24 hours have been reviewed. and   Recent Lab Results  (Last 5 results in the past 24 hours)        02/10/24  1044   02/10/24  0536   02/10/24  0502   02/09/24  1917   02/09/24  1548        Anion Gap     8           Baso #     0.02           Basophil %     0.5           BUN     15           Calcium     9.2           Chloride     98           CO2     28           Creatinine     1.0           Differential Method     Automated           eGFR     >60           Eos #     0.2           Eos %     4.0           Glucose     139           Gran # (ANC)     2.1           Gran %     54.1           Hematocrit     51.8           Hemoglobin     17.4           Immature Grans (Abs)     0.01  Comment: Mild elevation in immature granulocytes is non specific and   can be seen in a variety of conditions including stress response,   acute inflammation, trauma and pregnancy. Correlation with other   laboratory and clinical findings is essential.             Immature Granulocytes     0.3           Lymph #     1.0           Lymph %     26.5           MCH     29.2           MCHC     33.6           MCV     87           Mono #     0.6           Mono %     14.6           MPV     10.9           nRBC     0           Platelet Count     190           POCT Glucose 201   143     158   168       Potassium     3.3           RBC     5.96           RDW     17.5           Sodium     134           WBC     3.78                                Assessment and Plan:     Brief HPI:     * Acute on chronic combined systolic and diastolic congestive heart failure  BNP elevated  Echo with 25-30% EF  Cont OMT IV diuresis, coreg, lisinopril  Recommend R/LHC once compensated  Will plan on switching to entresto in future  Strict I/Os  Low Na diet    2/7/24  R/LHC planned for tomorrow  All risks, benefits and treatment alternatives explained all questions answered pt  agreeable to proceed   NPO mn    2.8.2024  Appears to be euvolemic today.    No prior ischemic workup.    Going for right and left heart catheterization today for severe cardiomyopathy.    2/9/24  R/LHC yesterday nonobstructive, EF 25%, moderate pulm HTN  Cont OMT BB, entresto, lasix  Strict I/Os  LifeVest ordered  F/u in clinic      2.10.2024  Entresto.  Lasix 40 mg po daily.  And carvedilol on discharge.    Follow-up with me in clinic.    LifeVest approved.  Pending placement.    Okay to leave after his LifeVest is fitted.        Stroke  H/o SAH followed by CVT    CAD (coronary artery disease)  Cont asa, statin, BB, plavix  R/LHC once compensated    2/7/24  R/LHC planned for tomorrow    2/9/24  Asa, statin, BB, plavix  LHC nonobs EF 25%, mod pulm HTN    Hypertension  Titrate medications      Elevated troponin  Troponin flat, likely demand  Echo with low EF  Recommend R/LHC once compensated  Cont ASA, statin, BB    2/7/24  R/LHC tomorrow  Npo mn    2/8/24  S/p LHC nonobstrctive, EF 25%    Carotid stenosis, asymptomatic, bilateral  Followed by CVT  Cont home regimen        VTE Risk Mitigation (From admission, onward)           Ordered     heparin (porcine) injection 5,000 Units  Every 8 hours         02/06/24 0638     IP VTE HIGH RISK PATIENT  Once         02/06/24 0638     Place sequential compression device  Until discontinued         02/06/24 0638                    Genie Claros MD  Cardiology  [facility] - Telemetry ([facility])

## 2024-02-10 NOTE — ASSESSMENT & PLAN NOTE
BNP elevated  Echo with 25-30% EF  Cont OMT IV diuresis, coreg, lisinopril  Recommend R/LHC once compensated  Will plan on switching to entresto in future  Strict I/Os  Low Na diet    2/7/24  R/LHC planned for tomorrow  All risks, benefits and treatment alternatives explained all questions answered pt agreeable to proceed   NPO mn    2.8.2024  Appears to be euvolemic today.    No prior ischemic workup.    Going for right and left heart catheterization today for severe cardiomyopathy.    2/9/24  R/LHC yesterday nonobstructive, EF 25%, moderate pulm HTN  Cont OMT BB, entresto, lasix  Strict I/Os  LifeVest ordered  F/u in clinic      2.10.2024  Entresto.  Lasix 40 mg po daily.  And carvedilol on discharge.    Follow-up with me in clinic.    LifeVest approved.  Pending placement.    Okay to leave after his LifeVest is fitted.

## 2024-02-10 NOTE — PLAN OF CARE
A204/A204 ROGE Matthew is a 68 y.o.male admitted on 2/6/2024 for Acute on chronic combined systolic and diastolic congestive heart failure   Code Status: Full Code MRN: [id number]   Review of patient's allergies indicates:   Allergen Reactions    Statins-hmg-coa reductase inhibitors Other (See Comments)     Past Medical History:   Diagnosis Date    CAD (coronary artery disease)     Carotid artery dissection     CHF (congestive heart failure)     Gout, unspecified     HLD (hyperlipidemia)     HTN (hypertension)     Hyperparathyroidism, unspecified     JAMES (obstructive sleep apnea)     Prostate cancer     SAH (subarachnoid hemorrhage) 01/2023    Stroke 2020    Type 2 diabetes mellitus without complications       PRN meds    acetaminophen, 650 mg, Q4H PRN  labetaloL, 10 mg, Q4H PRN  melatonin, 6 mg, Nightly PRN  ondansetron, 8 mg, Q8H PRN  ondansetron, 4 mg, Q8H PRN  sodium chloride 0.9%, 10 mL, PRN      Chart check completed. Will continue plan of care.      Orientation: oriented x 4  Radha Coma Scale Score: 15     Lead Monitored: Lead II Rhythm: normal sinus rhythm Frequency/Ectopy: PVCs  Cardiac/Telemetry Box Number: [id number]  VTE Required Core Measure: Pharmacological prophylaxis initiated/maintained Last Bowel Movement: 02/07/24  Diet Cardiac Standard Tray     Lenny Score: 21  Fall Risk Score: 13  Accucheck []   Freq?      Lines/Drains/Airways       Peripheral Intravenous Line  Duration                  Peripheral IV - Single Lumen 02/06/24 2340 20 G Posterior;Right Hand 3 days                       Problem: Adult Inpatient Plan of Care  Goal: Plan of Care Review  Outcome: Met  Goal: Patient-Specific Goal (Individualized)  Outcome: Met  Goal: Absence of Hospital-Acquired Illness or Injury  Outcome: Met  Goal: Optimal Comfort and Wellbeing  Outcome: Met  Goal: Readiness for Transition of Care  Outcome: Met     Problem: Bariatric Environmental Safety  Goal: Safety Maintained with Care  Outcome: Met      Problem: Diabetes Comorbidity  Goal: Blood Glucose Level Within Targeted Range  Outcome: Met     Problem: Fall Injury Risk  Goal: Absence of Fall and Fall-Related Injury  Outcome: Met     Problem: Impaired Wound Healing  Goal: Optimal Wound Healing  Outcome: Met

## 2024-02-10 NOTE — DISCHARGE INSTRUCTIONS
Compliance with medications, follow-up visits with PCP/Cardiology within 1-2 weeks upon discharge, cardiac diet.

## 2024-02-10 NOTE — PLAN OF CARE
[facility] - Telemetry (Hospital)  Discharge Final Note    Primary Care Provider: Cheri Dietz    Expected Discharge Date: 2/10/2024    Final Discharge Note (most recent)       Final Note - 02/10/24 1339          Final Note    Assessment Type Final Discharge Note     Anticipated Discharge Disposition Home or Self Care        Post-Acute Status    HME Status Set-up Complete/Auth obtained   LifeVest teaching complete.    Discharge Delays None known at this time                   No needs at time of chart review. LifeVest teaching completed at bedside. KM,MSW    Important Message from Medicare             Contact Info       [facility]   Relationship: PCP - General    [address] [facility] [address]   Phone: [phone]       Next Steps: Follow up in 1 week(s)    Genie Claros MD   Specialty: Interventional Cardiology, Cardiology    [address]  [place] [address]   Phone: [phone]       Next Steps: Follow up in 1 week(s)

## 2024-02-11 ENCOUNTER — HOSPITAL ENCOUNTER (INPATIENT)
Facility: HOSPITAL | Age: 69
LOS: 1 days | Discharge: HOME HEALTH | DRG: 312 | End: 2024-02-13
Attending: STUDENT IN AN ORGANIZED HEALTH CARE EDUCATION/TRAINING PROGRAM | Admitting: INTERNAL MEDICINE
Payer: MEDICARE

## 2024-02-11 DIAGNOSIS — R55 NEAR SYNCOPE: ICD-10-CM

## 2024-02-11 DIAGNOSIS — R55 SYNCOPE: ICD-10-CM

## 2024-02-11 DIAGNOSIS — I50.42 CHRONIC COMBINED SYSTOLIC AND DIASTOLIC CONGESTIVE HEART FAILURE: ICD-10-CM

## 2024-02-11 DIAGNOSIS — N17.9 AKI (ACUTE KIDNEY INJURY): Primary | ICD-10-CM

## 2024-02-11 DIAGNOSIS — R41.82 AMS (ALTERED MENTAL STATUS): ICD-10-CM

## 2024-02-11 PROBLEM — I95.9 HYPOTENSION: Status: ACTIVE | Noted: 2024-02-11

## 2024-02-11 LAB
ALBUMIN SERPL BCP-MCNC: 3.3 G/DL (ref 3.5–5.2)
ALLENS TEST: ABNORMAL
ALP SERPL-CCNC: 72 U/L (ref 55–135)
ALT SERPL W/O P-5'-P-CCNC: 29 U/L (ref 10–44)
ANION GAP SERPL CALC-SCNC: 18 MMOL/L (ref 8–16)
AST SERPL-CCNC: 28 U/L (ref 10–40)
BACTERIA #/AREA URNS HPF: ABNORMAL /HPF
BASOPHILS # BLD AUTO: 0.02 K/UL (ref 0–0.2)
BASOPHILS NFR BLD: 0.5 % (ref 0–1.9)
BILIRUB SERPL-MCNC: 2 MG/DL (ref 0.1–1)
BILIRUB UR QL STRIP: NEGATIVE
BNP SERPL-MCNC: 131 PG/ML (ref 0–99)
BUN SERPL-MCNC: 20 MG/DL (ref 8–23)
CALCIUM SERPL-MCNC: 9.4 MG/DL (ref 8.7–10.5)
CHLORIDE SERPL-SCNC: 96 MMOL/L (ref 95–110)
CLARITY UR: CLEAR
CO2 SERPL-SCNC: 24 MMOL/L (ref 23–29)
COLOR UR: YELLOW
CREAT SERPL-MCNC: 1.6 MG/DL (ref 0.5–1.4)
DELSYS: ABNORMAL
DIFFERENTIAL METHOD BLD: ABNORMAL
EOSINOPHIL # BLD AUTO: 0.1 K/UL (ref 0–0.5)
EOSINOPHIL NFR BLD: 3.1 % (ref 0–8)
ERYTHROCYTE [DISTWIDTH] IN BLOOD BY AUTOMATED COUNT: 18.1 % (ref 11.5–14.5)
EST. GFR  (NO RACE VARIABLE): 47 ML/MIN/1.73 M^2
ESTIMATED AVG GLUCOSE: 140 MG/DL (ref 68–131)
FIO2: 100
FLOW: 6
GLUCOSE SERPL-MCNC: 162 MG/DL (ref 70–110)
GLUCOSE UR QL STRIP: NEGATIVE
HBA1C MFR BLD: 6.5 % (ref 4–5.6)
HCO3 UR-SCNC: 32.8 MMOL/L (ref 24–28)
HCT VFR BLD AUTO: 57.3 % (ref 40–54)
HGB BLD-MCNC: 19.3 G/DL (ref 14–18)
HGB UR QL STRIP: NEGATIVE
HYALINE CASTS #/AREA URNS LPF: 34 /LPF
IMM GRANULOCYTES # BLD AUTO: 0.02 K/UL (ref 0–0.04)
IMM GRANULOCYTES NFR BLD AUTO: 0.5 % (ref 0–0.5)
KETONES UR QL STRIP: NEGATIVE
LACTATE SERPL-SCNC: 2.2 MMOL/L (ref 0.5–2.2)
LEUKOCYTE ESTERASE UR QL STRIP: NEGATIVE
LIPASE SERPL-CCNC: 36 U/L (ref 4–60)
LYMPHOCYTES # BLD AUTO: 1.2 K/UL (ref 1–4.8)
LYMPHOCYTES NFR BLD: 29.7 % (ref 18–48)
MAGNESIUM SERPL-MCNC: 1.8 MG/DL (ref 1.6–2.6)
MCH RBC QN AUTO: 29.6 PG (ref 27–31)
MCHC RBC AUTO-ENTMCNC: 33.7 G/DL (ref 32–36)
MCV RBC AUTO: 88 FL (ref 82–98)
MICROSCOPIC COMMENT: ABNORMAL
MODE: ABNORMAL
MONOCYTES # BLD AUTO: 0.5 K/UL (ref 0.3–1)
MONOCYTES NFR BLD: 12 % (ref 4–15)
NEUTROPHILS # BLD AUTO: 2.3 K/UL (ref 1.8–7.7)
NEUTROPHILS NFR BLD: 54.2 % (ref 38–73)
NITRITE UR QL STRIP: NEGATIVE
NRBC BLD-RTO: 0 /100 WBC
OHS QRS DURATION: 112 MS
OHS QTC CALCULATION: 481 MS
PCO2 BLDA: 53.4 MMHG (ref 35–45)
PH SMN: 7.4 [PH] (ref 7.35–7.45)
PH UR STRIP: 6 [PH] (ref 5–8)
PLATELET # BLD AUTO: 226 K/UL (ref 150–450)
PMV BLD AUTO: 11 FL (ref 9.2–12.9)
PO2 BLDA: 303 MMHG (ref 80–100)
POC BE: 8 MMOL/L
POC SATURATED O2: 100 % (ref 95–100)
POCT GLUCOSE: 140 MG/DL (ref 70–110)
POCT GLUCOSE: 157 MG/DL (ref 70–110)
POCT GLUCOSE: 184 MG/DL (ref 70–110)
POTASSIUM SERPL-SCNC: 4 MMOL/L (ref 3.5–5.1)
PROT SERPL-MCNC: 7.6 G/DL (ref 6–8.4)
PROT UR QL STRIP: ABNORMAL
RBC # BLD AUTO: 6.52 M/UL (ref 4.6–6.2)
RBC #/AREA URNS HPF: 0 /HPF (ref 0–4)
SAMPLE: ABNORMAL
SITE: ABNORMAL
SODIUM SERPL-SCNC: 138 MMOL/L (ref 136–145)
SP GR UR STRIP: 1.02 (ref 1–1.03)
TROPONIN I SERPL DL<=0.01 NG/ML-MCNC: 0.09 NG/ML (ref 0–0.03)
URN SPEC COLLECT METH UR: ABNORMAL
UROBILINOGEN UR STRIP-ACNC: NEGATIVE EU/DL
WBC # BLD AUTO: 4.18 K/UL (ref 3.9–12.7)
WBC #/AREA URNS HPF: 3 /HPF (ref 0–5)
WBC CLUMPS URNS QL MICRO: ABNORMAL

## 2024-02-11 PROCEDURE — 99223 1ST HOSP IP/OBS HIGH 75: CPT | Mod: 25,,, | Performed by: INTERNAL MEDICINE

## 2024-02-11 PROCEDURE — 63600175 PHARM REV CODE 636 W HCPCS: Performed by: NURSE PRACTITIONER

## 2024-02-11 PROCEDURE — 87040 BLOOD CULTURE FOR BACTERIA: CPT | Mod: 59 | Performed by: STUDENT IN AN ORGANIZED HEALTH CARE EDUCATION/TRAINING PROGRAM

## 2024-02-11 PROCEDURE — 80053 COMPREHEN METABOLIC PANEL: CPT | Performed by: STUDENT IN AN ORGANIZED HEALTH CARE EDUCATION/TRAINING PROGRAM

## 2024-02-11 PROCEDURE — 99285 EMERGENCY DEPT VISIT HI MDM: CPT | Mod: 25

## 2024-02-11 PROCEDURE — 83605 ASSAY OF LACTIC ACID: CPT | Performed by: STUDENT IN AN ORGANIZED HEALTH CARE EDUCATION/TRAINING PROGRAM

## 2024-02-11 PROCEDURE — 96360 HYDRATION IV INFUSION INIT: CPT

## 2024-02-11 PROCEDURE — 93010 ELECTROCARDIOGRAM REPORT: CPT | Mod: ,,, | Performed by: INTERNAL MEDICINE

## 2024-02-11 PROCEDURE — G0378 HOSPITAL OBSERVATION PER HR: HCPCS

## 2024-02-11 PROCEDURE — 84484 ASSAY OF TROPONIN QUANT: CPT | Performed by: STUDENT IN AN ORGANIZED HEALTH CARE EDUCATION/TRAINING PROGRAM

## 2024-02-11 PROCEDURE — 83880 ASSAY OF NATRIURETIC PEPTIDE: CPT | Performed by: STUDENT IN AN ORGANIZED HEALTH CARE EDUCATION/TRAINING PROGRAM

## 2024-02-11 PROCEDURE — 93005 ELECTROCARDIOGRAM TRACING: CPT

## 2024-02-11 PROCEDURE — 25000003 PHARM REV CODE 250: Performed by: NURSE PRACTITIONER

## 2024-02-11 PROCEDURE — 96372 THER/PROPH/DIAG INJ SC/IM: CPT | Performed by: NURSE PRACTITIONER

## 2024-02-11 PROCEDURE — 82962 GLUCOSE BLOOD TEST: CPT

## 2024-02-11 PROCEDURE — 82803 BLOOD GASES ANY COMBINATION: CPT

## 2024-02-11 PROCEDURE — 99900035 HC TECH TIME PER 15 MIN (STAT)

## 2024-02-11 PROCEDURE — 36415 COLL VENOUS BLD VENIPUNCTURE: CPT | Performed by: NURSE PRACTITIONER

## 2024-02-11 PROCEDURE — 85025 COMPLETE CBC W/AUTO DIFF WBC: CPT | Performed by: STUDENT IN AN ORGANIZED HEALTH CARE EDUCATION/TRAINING PROGRAM

## 2024-02-11 PROCEDURE — 94761 N-INVAS EAR/PLS OXIMETRY MLT: CPT | Mod: XB

## 2024-02-11 PROCEDURE — 96361 HYDRATE IV INFUSION ADD-ON: CPT

## 2024-02-11 PROCEDURE — 83735 ASSAY OF MAGNESIUM: CPT | Performed by: STUDENT IN AN ORGANIZED HEALTH CARE EDUCATION/TRAINING PROGRAM

## 2024-02-11 PROCEDURE — 81000 URINALYSIS NONAUTO W/SCOPE: CPT | Performed by: STUDENT IN AN ORGANIZED HEALTH CARE EDUCATION/TRAINING PROGRAM

## 2024-02-11 PROCEDURE — 83036 HEMOGLOBIN GLYCOSYLATED A1C: CPT | Performed by: NURSE PRACTITIONER

## 2024-02-11 PROCEDURE — 83690 ASSAY OF LIPASE: CPT | Performed by: STUDENT IN AN ORGANIZED HEALTH CARE EDUCATION/TRAINING PROGRAM

## 2024-02-11 PROCEDURE — 36600 WITHDRAWAL OF ARTERIAL BLOOD: CPT

## 2024-02-11 PROCEDURE — 25000003 PHARM REV CODE 250: Performed by: STUDENT IN AN ORGANIZED HEALTH CARE EDUCATION/TRAINING PROGRAM

## 2024-02-11 RX ORDER — SODIUM CHLORIDE 0.9 % (FLUSH) 0.9 %
10 SYRINGE (ML) INJECTION
Status: DISCONTINUED | OUTPATIENT
Start: 2024-02-11 | End: 2024-02-13 | Stop reason: HOSPADM

## 2024-02-11 RX ORDER — GLUCAGON 1 MG
1 KIT INJECTION
Status: DISCONTINUED | OUTPATIENT
Start: 2024-02-11 | End: 2024-02-13 | Stop reason: HOSPADM

## 2024-02-11 RX ORDER — IBUPROFEN 200 MG
24 TABLET ORAL
Status: DISCONTINUED | OUTPATIENT
Start: 2024-02-11 | End: 2024-02-13 | Stop reason: HOSPADM

## 2024-02-11 RX ORDER — CLOPIDOGREL BISULFATE 75 MG/1
75 TABLET ORAL DAILY
Status: DISCONTINUED | OUTPATIENT
Start: 2024-02-11 | End: 2024-02-13 | Stop reason: HOSPADM

## 2024-02-11 RX ORDER — LEVOTHYROXINE SODIUM 25 UG/1
25 TABLET ORAL
Status: DISCONTINUED | OUTPATIENT
Start: 2024-02-11 | End: 2024-02-13 | Stop reason: HOSPADM

## 2024-02-11 RX ORDER — INSULIN ASPART 100 [IU]/ML
0-5 INJECTION, SOLUTION INTRAVENOUS; SUBCUTANEOUS
Status: DISCONTINUED | OUTPATIENT
Start: 2024-02-11 | End: 2024-02-13 | Stop reason: HOSPADM

## 2024-02-11 RX ORDER — AMOXICILLIN 250 MG
1 CAPSULE ORAL 2 TIMES DAILY
Status: DISCONTINUED | OUTPATIENT
Start: 2024-02-11 | End: 2024-02-13 | Stop reason: HOSPADM

## 2024-02-11 RX ORDER — IBUPROFEN 200 MG
16 TABLET ORAL
Status: DISCONTINUED | OUTPATIENT
Start: 2024-02-11 | End: 2024-02-13 | Stop reason: HOSPADM

## 2024-02-11 RX ORDER — ASPIRIN 81 MG/1
81 TABLET ORAL DAILY
Status: DISCONTINUED | OUTPATIENT
Start: 2024-02-11 | End: 2024-02-13 | Stop reason: HOSPADM

## 2024-02-11 RX ORDER — ONDANSETRON HYDROCHLORIDE 2 MG/ML
4 INJECTION, SOLUTION INTRAVENOUS EVERY 8 HOURS PRN
Status: DISCONTINUED | OUTPATIENT
Start: 2024-02-11 | End: 2024-02-13 | Stop reason: HOSPADM

## 2024-02-11 RX ORDER — ENOXAPARIN SODIUM 100 MG/ML
40 INJECTION SUBCUTANEOUS EVERY 24 HOURS
Status: DISCONTINUED | OUTPATIENT
Start: 2024-02-11 | End: 2024-02-13 | Stop reason: HOSPADM

## 2024-02-11 RX ADMIN — DOCUSATE SODIUM AND SENNOSIDES 1 TABLET: 8.6; 5 TABLET, FILM COATED ORAL at 08:02

## 2024-02-11 RX ADMIN — CLOPIDOGREL BISULFATE 75 MG: 75 TABLET ORAL at 08:02

## 2024-02-11 RX ADMIN — ENOXAPARIN SODIUM 40 MG: 40 INJECTION SUBCUTANEOUS at 05:02

## 2024-02-11 RX ADMIN — SODIUM CHLORIDE 250 ML: 9 INJECTION, SOLUTION INTRAVENOUS at 03:02

## 2024-02-11 RX ADMIN — SODIUM CHLORIDE 250 ML: 0.9 INJECTION, SOLUTION INTRAVENOUS at 05:02

## 2024-02-11 RX ADMIN — ASPIRIN 81 MG: 81 TABLET, COATED ORAL at 08:02

## 2024-02-11 RX ADMIN — LEVOTHYROXINE SODIUM 25 MCG: 0.03 TABLET ORAL at 06:02

## 2024-02-11 NOTE — CONSULTS
[facility] - Emergency Dept.  Cardiology  Consult Note    Patient Name: Ron Matthew  MRN: [id number]  Admission Date: 2/11/2024  Hospital Length of Stay: 0 days  Code Status: Full Code   Attending Provider: Rodriguez Sotelo MD   Consulting Provider: Genie Claros MD  Primary Care Physician: Cheri Dietz  Principal Problem:Near syncope    Patient information was obtained from patient, past medical records, and ER records.     Inpatient consult to Cardiology  Consult performed by: Genie Claros MD  Consult ordered by: Elba Carbone NP  Reason for consult: Syncope        Subjective:     Chief Complaint:  Syncope     HPI:   69 yo male with CAD, CHF-EF 25%, DM, HTN, HLD, Gout, JAMES, Prostate cancer, Stroke with right sided hemiparesis in 2020, Carotid dissection and SAH 1/2023 who presented to ED with near syncope. The patient was just discharged from hospital yesterday for CHF exacerbation. He was discharged on Lasix 40mg daily and Entresto (new Rx). The pt's wife states he took his night time Coreg at approx 2130 and went to bed. Ge got up to use the bathroom. After he had a BM, he was diaphoretic and unresponsive. When EMS arrived, pt was diaphoretic and lethargic with a GCS of 9. Pt also experienced 1 episode of vomiting PTA. Pt's symptoms have improved and he is back to his normal mental status.      In the ED, BP 78/56. Oxygenation normal. Labs revealed Hct 57, mild MAHAD with sCr 1.6. BNP has improved from 1238>131. Troponin 0.090. EKG showed NSR with 1st degree Av block with TWI. CXR- clear          Upon my interview.  Is awake and oriented.    He passed out after he stood up in the bathroom.    BNP seen to have corrected from 1200 to 130.  He appears euvolemic   Shows MAHAD on his blood work.    Of note he was borderline hypertensive during his hospital visit, and was switched from lisinopril to lower equivalent dose of Entresto after 36 hours washout.        Past Medical History:    Diagnosis Date    CAD (coronary artery disease)     Carotid artery dissection     CHF (congestive heart failure)     Gout, unspecified     HLD (hyperlipidemia)     HTN (hypertension)     Hyperparathyroidism, unspecified     JAMES (obstructive sleep apnea)     Prostate cancer     SAH (subarachnoid hemorrhage) 01/2023    Stroke 2020    Type 2 diabetes mellitus without complications        Past Surgical History:   Procedure Laterality Date    ANGIOGRAM, CAROTID      ANGIOGRAM, VERTEBRAL      CATHETERIZATION OF BOTH LEFT AND RIGHT HEART N/A 2/8/2024    Procedure: CATHETERIZATION, HEART, BOTH LEFT AND RIGHT;  Surgeon: Genie Claros MD;  Location: [facility] CATH LAB;  Service: Cardiology;  Laterality: N/A;    PARATHYROIDECTOMY         Review of patient's allergies indicates:   Allergen Reactions    Statins-hmg-coa reductase inhibitors Other (See Comments)       Current Facility-Administered Medications on File Prior to Encounter   Medication    [DISCONTINUED] acetaminophen tablet 650 mg    [DISCONTINUED] amLODIPine tablet 2.5 mg    [DISCONTINUED] aspirin chewable tablet 81 mg    [DISCONTINUED] carvediloL tablet 12.5 mg    [DISCONTINUED] clopidogreL tablet 75 mg    [DISCONTINUED] furosemide injection 40 mg    [DISCONTINUED] heparin (porcine) injection 5,000 Units    [DISCONTINUED] labetaloL injection 10 mg    [DISCONTINUED] lactated ringers bolus 250 mL    [DISCONTINUED] lactated ringers bolus 250 mL    [DISCONTINUED] levothyroxine tablet 25 mcg    [DISCONTINUED] melatonin tablet 6 mg    [DISCONTINUED] metOLazone tablet 5 mg    [DISCONTINUED] ondansetron disintegrating tablet 8 mg    [DISCONTINUED] ondansetron injection 4 mg    [DISCONTINUED] sacubitriL-valsartan 49-51 mg per tablet 1 tablet    [DISCONTINUED] senna-docusate 8.6-50 mg per tablet 1 tablet    [DISCONTINUED] sodium chloride 0.9% flush 10 mL     Current Outpatient Medications on File Prior to Encounter   Medication Sig    allopurinoL (ZYLOPRIM) 100 MG tablet  Take 100 mg by mouth daily as needed.    amLODIPine (NORVASC) 2.5 MG tablet Take 1 tablet (2.5 mg total) by mouth once daily.    aspirin 81 mg Cap Take 81 mg by mouth once daily.    carvediloL (COREG) 12.5 MG tablet Take 1 tablet (12.5 mg total) by mouth 2 (two) times daily.    cholecalciferol, vitamin D3, (VITAMIN D3) 50 mcg (2,000 unit) Cap capsule Take 1 capsule by mouth once daily.    clopidogreL (PLAVIX) 75 mg tablet Take 1 tablet (75 mg total) by mouth once daily.    DOCOSAHEXAENOIC ACID ORAL Take 1 capsule by mouth once daily.    empagliflozin (JARDIANCE) 25 mg tablet Take 1 tablet by mouth once daily.    furosemide (LASIX) 40 MG tablet Take 1 tablet (40 mg total) by mouth once daily.    levothyroxine (SYNTHROID) 25 MCG tablet Take 1 tablet by mouth every morning.    magnesium oxide (MAG-OX) 250 mg magnesium Tab Take 1 tablet by mouth every morning.    metFORMIN (GLUCOPHAGE) 1000 MG tablet Take 1,000 mg by mouth 2 (two) times daily with meals.    sacubitriL-valsartan (ENTRESTO) 49-51 mg per tablet Take 1 tablet by mouth 2 (two) times daily.     Family History       Problem Relation (Age of Onset)    Diabetes Mother    Hypertension Mother, Father          Tobacco Use    Smoking status: Never    Smokeless tobacco: Not on file   Substance and Sexual Activity    Alcohol use: Never    Drug use: Never    Sexual activity: Not on file     Review of Systems   Cardiovascular:  Negative for chest pain, dyspnea on exertion, palpitations and syncope.   Genitourinary: Negative.    Neurological: Negative.      Objective:     Vital Signs (Most Recent):  Temp: 97.8 °F (36.6 °C) (02/11/24 1000)  Pulse: 70 (02/11/24 1030)  Resp: 20 (02/11/24 1030)  BP: 98/61 (02/11/24 1030)  SpO2: 96 % (02/11/24 1030) Vital Signs (24h Range):  Temp:  [97.7 °F (36.5 °C)-98.4 °F (36.9 °C)] 97.8 °F (36.6 °C)  Pulse:  [60-80] 70  Resp:  [18-27] 20  SpO2:  [94 %-99 %] 96 %  BP: ()/(53-75) 98/61     Weight: 125.9 kg (277 lb 9 oz)  Body mass  index is 36.62 kg/m².    SpO2: 96 %         Intake/Output Summary (Last 24 hours) at 2/11/2024 1059  Last data filed at 2/11/2024 0356  Gross per 24 hour   Intake 250 ml   Output --   Net 250 ml       Lines/Drains/Airways       Peripheral Intravenous Line  Duration                  Peripheral IV - Double Lumen 02/11/24 0309 16 G Anterior;Distal;Right Forearm <1 day         Peripheral IV - Single Lumen 02/11/24 0233 18 G Anterior;Right Forearm <1 day                     Physical Exam  Vitals reviewed.   Constitutional:       Appearance: He is well-developed.   Neck:      Vascular: No carotid bruit.   Cardiovascular:      Rate and Rhythm: Normal rate and regular rhythm.      Pulses: Intact distal pulses.      Heart sounds: Normal heart sounds. No murmur heard.  Pulmonary:      Breath sounds: Normal breath sounds.   Neurological:      Mental Status: He is oriented to person, place, and time.          Significant Labs: Troponin   Recent Labs   Lab 02/11/24  0258   TROPONINI 0.090*   , All pertinent lab results from the last 24 hours have been reviewed., and   Recent Lab Results  (Last 5 results in the past 24 hours)        02/11/24  0950   02/11/24  0733   02/11/24  0258   02/11/24  0246   02/11/24  0235        Albumin     3.3           ALP     72           Allens Test       Pass         ALT     29           Anion Gap     18           Appearance, UA Clear               AST     28           Bacteria, UA Rare               Baso #         0.02       Basophil %         0.5       Bilirubin (UA) Negative               BILIRUBIN TOTAL     2.0  Comment: For infants and newborns, interpretation of results should be based  on gestational age, weight and in agreement with clinical  observations.    Premature Infant recommended reference ranges:  Up to 24 hours.............<8.0 mg/dL  Up to 48 hours............<12.0 mg/dL  3-5 days..................<15.0 mg/dL  6-29 days.................<15.0 mg/dL             BNP          131  Comment: Values of less than 100 pg/ml are consistent with non-CHF populations.       Site       RR         BUN     20           Calcium     9.4           Chloride     96           CO2     24           Color, UA Yellow               Creatinine     1.6           DelSys       NRB         Differential Method         Automated       eGFR     47           Eos #         0.1       Eos %         3.1       FiO2       100         Flow       6         Glucose     162           Glucose, UA Negative               Gran # (ANC)         2.3       Gran %         54.2       Hematocrit         57.3       Hemoglobin         19.3       Hyaline Casts, UA 34               Immature Grans (Abs)         0.02  Comment: Mild elevation in immature granulocytes is non specific and   can be seen in a variety of conditions including stress response,   acute inflammation, trauma and pregnancy. Correlation with other   laboratory and clinical findings is essential.         Immature Granulocytes         0.5       Ketones, UA Negative               Lactic Acid Level         2.2  Comment: Falsely low lactic acid results can be found in samples   containing >=13.0 mg/dL total bilirubin and/or >=3.5 mg/dL   direct bilirubin.         Leukocyte Esterase, UA Negative               Lipase     36           Lymph #         1.2       Lymph %         29.7       Magnesium      1.8           MCH         29.6       MCHC         33.7       MCV         88       Microscopic Comment SEE COMMENT  Comment: Other formed elements not mentioned in the report are not   present in the microscopic examination.                  Mode       SPONT         Mono #         0.5       Mono %         12.0       MPV         11.0       NITRITE UA Negative               nRBC         0       Blood, UA Negative               pH, UA 6.0               Platelet Count         226       POC BE       8         POC HCO3       32.8         POC PCO2       53.4         POC PH       7.396          POC PO2       303         POC SATURATED O2       100         POCT Glucose   140             Potassium     4.0           PROTEIN TOTAL     7.6           Protein, UA 1+  Comment: Recommend a 24 hour urine protein or a urine   protein/creatinine ratio if globulin induced proteinuria is  clinically suspected.                 RBC         6.52       RBC, UA 0               RDW         18.1       Sample       ARTERIAL         Sodium     138           Specific Gravity, UA 1.020               Specimen UA Urine, Catheterized               Troponin I     0.090  Comment: The reference interval for Troponin I represents the 99th percentile   cutoff   for our facility and is consistent with 3rd generation assay   performance.             UROBILINOGEN UA Negative               WBC Clumps, UA Occasional               WBC, UA 3               WBC         4.18                              Significant Imaging: Results for orders placed during the hospital encounter of 02/06/24    Cardiac catheterization    Conclusion    The ejection fraction was calculated to be 25%.    The pre-procedure left ventricular end diastolic pressure was 29.    The post-procedure left ventricular end diastolic pressure was 33.    The estimated blood loss was none.    There was non-obstructive coronary artery disease..    The filling pressures on the right and left were moderately elevated. Pulmonary hypertension was moderate.    Non ischemic dilated cardiomyopathy    The procedure log was documented by Documenter: Nahomi Reddy RN and verified by Genie Claros MD.    Date: 2/8/2024  Time: 5:21 PM    Assessment and Plan:     * Near syncope  Seen plan below    MAHAD (acute kidney injury)  Holding Entresto and Lasix    Hypotension  Hold BP meds    Chronic combined systolic and diastolic congestive heart failure  No signs of fluid overload.    Possibly hypovolemic may be over diuresed on his last visit.    Hold off on Lasix for now.    Hold Entresto given  his MAHAD.    Currently off beta-blockers as blood pressure currently is 90's/60.    When more stable we will change his carvedilol to Toprol 25 mg XL.    And will decrease his Entresto to lower the b.i.d..  Wife is not sure if he had a blue ink discharge from the device  We will get it checked.            VTE Risk Mitigation (From admission, onward)           Ordered     enoxaparin injection 40 mg  Daily         02/11/24 0458     IP VTE HIGH RISK PATIENT  Once         02/11/24 0458     Place sequential compression device  Until discontinued         02/11/24 0458                    Thank you for your consult. I will follow-up with patient. Please contact us if you have any additional questions.    Genie Claros MD  Cardiology   [facility] - Emergency Dept.

## 2024-02-11 NOTE — H&P
[facility] - Emergency Dept.  [facility] Medicine  History & Physical    Patient Name: Ron Matthew  MRN: [id number]  Patient Class: OP- Observation  Admission Date: 2/11/2024  Attending Physician: Servando Stevens MD   Primary Care Provider: Administration, Cheri         Patient information was obtained from patient and ER records.     Subjective:     Principal Problem:Near syncope    Chief Complaint:   Chief Complaint   Patient presents with    Respiratory Distress     Pt to ER via EMS for evaluation of respiratory distress and lethargy; pt was discharged from [facility] yesterday after being admitted for aprox 1 week; pts wife pt was found unresponsive sitting in restroom; EMS reports that on arrival pt was diaphoretic and lethargic with GCS of 9; pt currently AAOx3 with GCS of 15; ; pt arrived on 10L O2 via NRB        HPI:  The patient is a 67 yo male with CAD, CHF-EF 25%, DM, HTN, HLD, Gout, JAMES, Prostate cancer, Stroke with right sided hemiparesis in 2020, Carotid dissection and SAH 1/2023 who presented to ED with near syncope. The patient was just discharged from hospital yesterday for CHF exacerbation. He was discharged on Lasix 40mg daily and Entresto (new Rx). The pt's wife states he took his night time Coreg at approx 2130 and went to bed. Ge got up to use the bathroom. After he had a BM, he was diaphoretic and unresponsive. When EMS arrived, pt was diaphoretic and lethargic with a GCS of 9. Pt also experienced 1 episode of vomiting PTA. Pt's symptoms have improved and he is back to his normal mental status.     In the ED, BP 78/56. Oxygenation normal. Labs revealed Hct 57, mild MAHAD with sCr 1.6. BNP has improved from 1238>131. Troponin 0.090. EKG showed NSR with 1st degree Av block with TWI. CXR- clear     Past Medical History:   Diagnosis Date    CAD (coronary artery disease)     Carotid artery dissection     CHF (congestive heart failure)     Gout, unspecified     HLD (hyperlipidemia)     HTN  (hypertension)     Hyperparathyroidism, unspecified     JAMES (obstructive sleep apnea)     Prostate cancer     SAH (subarachnoid hemorrhage) 01/2023    Stroke 2020    Type 2 diabetes mellitus without complications        Past Surgical History:   Procedure Laterality Date    ANGIOGRAM, CAROTID      ANGIOGRAM, VERTEBRAL      CATHETERIZATION OF BOTH LEFT AND RIGHT HEART N/A 2/8/2024    Procedure: CATHETERIZATION, HEART, BOTH LEFT AND RIGHT;  Surgeon: Genie Claros MD;  Location: [facility] CATH LAB;  Service: Cardiology;  Laterality: N/A;    PARATHYROIDECTOMY         Review of patient's allergies indicates:   Allergen Reactions    Statins-hmg-coa reductase inhibitors Other (See Comments)       Current Facility-Administered Medications on File Prior to Encounter   Medication    [COMPLETED] potassium chloride SA CR tablet 40 mEq    [DISCONTINUED] acetaminophen tablet 650 mg    [DISCONTINUED] amLODIPine tablet 2.5 mg    [DISCONTINUED] aspirin chewable tablet 81 mg    [DISCONTINUED] carvediloL tablet 12.5 mg    [DISCONTINUED] clopidogreL tablet 75 mg    [DISCONTINUED] furosemide injection 40 mg    [DISCONTINUED] heparin (porcine) injection 5,000 Units    [DISCONTINUED] labetaloL injection 10 mg    [DISCONTINUED] lactated ringers bolus 250 mL    [DISCONTINUED] lactated ringers bolus 250 mL    [DISCONTINUED] levothyroxine tablet 25 mcg    [DISCONTINUED] melatonin tablet 6 mg    [DISCONTINUED] metOLazone tablet 5 mg    [DISCONTINUED] ondansetron disintegrating tablet 8 mg    [DISCONTINUED] ondansetron injection 4 mg    [DISCONTINUED] sacubitriL-valsartan 49-51 mg per tablet 1 tablet    [DISCONTINUED] senna-docusate 8.6-50 mg per tablet 1 tablet    [DISCONTINUED] sodium chloride 0.9% flush 10 mL     Current Outpatient Medications on File Prior to Encounter   Medication Sig    allopurinoL (ZYLOPRIM) 100 MG tablet Take 100 mg by mouth daily as needed.    amLODIPine (NORVASC) 2.5 MG tablet Take 1 tablet (2.5 mg total) by mouth once  daily.    aspirin 81 mg Cap Take 81 mg by mouth once daily.    carvediloL (COREG) 12.5 MG tablet Take 1 tablet (12.5 mg total) by mouth 2 (two) times daily.    cholecalciferol, vitamin D3, (VITAMIN D3) 50 mcg (2,000 unit) Cap capsule Take 1 capsule by mouth once daily.    clopidogreL (PLAVIX) 75 mg tablet Take 1 tablet (75 mg total) by mouth once daily.    DOCOSAHEXAENOIC ACID ORAL Take 1 capsule by mouth once daily.    empagliflozin (JARDIANCE) 25 mg tablet Take 1 tablet by mouth once daily.    furosemide (LASIX) 40 MG tablet Take 1 tablet (40 mg total) by mouth once daily.    levothyroxine (SYNTHROID) 25 MCG tablet Take 1 tablet by mouth every morning.    magnesium oxide (MAG-OX) 250 mg magnesium Tab Take 1 tablet by mouth every morning.    metFORMIN (GLUCOPHAGE) 1000 MG tablet Take 1,000 mg by mouth 2 (two) times daily with meals.    sacubitriL-valsartan (ENTRESTO) 49-51 mg per tablet Take 1 tablet by mouth 2 (two) times daily.     Family History       Problem Relation (Age of Onset)    Diabetes Mother    Hypertension Mother, Father          Tobacco Use    Smoking status: Never    Smokeless tobacco: Not on file   Substance and Sexual Activity    Alcohol use: Never    Drug use: Never    Sexual activity: Not on file     Review of Systems   Constitutional:  Positive for activity change. Diaphoresis Negative for appetite change, chills, fatigue and fever.   HENT:  Negative for congestion, nosebleeds, sore throat and trouble swallowing.    Eyes:  Negative for pain, discharge and visual disturbance.   Respiratory:  Negative for apnea, cough, chest tightness, shortness of breath, wheezing and stridor.    Cardiovascular:  Negative for chest pain, palpitations and leg swelling.   Gastrointestinal:  Negative for abdominal distention, abdominal pain, blood in stool, constipation, diarrhea, nausea and vomiting.   Endocrine: Negative for cold intolerance and heat intolerance.   Genitourinary:  Negative for difficulty  urinating, dysuria, flank pain, frequency and urgency.   Musculoskeletal:  Negative for arthralgias, back pain, joint swelling, myalgias, neck pain and neck stiffness.   Skin:  Negative for rash and wound.   Allergic/Immunologic: Negative for food allergies and immunocompromised state.   Neurological:  Positive for dizziness and syncope (near syncope). Negative for seizures, facial asymmetry, weakness, light-headedness and headaches.   Hematological:  Negative for adenopathy.   Psychiatric/Behavioral:  Negative for agitation, behavioral problems and confusion. The patient is not nervous/anxious.      Objective:     Vital Signs (Most Recent):  Temp: 98.2 °F (36.8 °C) (02/11/24 0300)  Pulse: 69 (02/11/24 0500)  Resp: 19 (02/11/24 0500)  BP: 108/73 (02/11/24 0500)  SpO2: 98 % (02/11/24 0500) Vital Signs (24h Range):  Temp:  [97.8 °F (36.6 °C)-98.2 °F (36.8 °C)] 98.2 °F (36.8 °C)  Pulse:  [60-80] 69  Resp:  [18-26] 19  SpO2:  [94 %-99 %] 98 %  BP: ()/(53-75) 108/73     Weight: 125.9 kg (277 lb 9 oz)  Body mass index is 36.62 kg/m².     Physical Exam  Vitals and nursing note reviewed.   Constitutional:       General: He is not in acute distress.     Appearance: He is well-developed. He is not diaphoretic.   HENT:      Head: Normocephalic and atraumatic.      Nose: Nose normal.   Eyes:      General: No scleral icterus.     Conjunctiva/sclera: Conjunctivae normal.   Cardiovascular:      Rate and Rhythm: Normal rate and regular rhythm.      Heart sounds: Normal heart sounds. No murmur heard.     No friction rub. No gallop.      Comments: Life vest in place   Pulmonary:      Effort: Pulmonary effort is normal. No respiratory distress.      Breath sounds: Normal breath sounds. No stridor. No wheezing or rales.   Chest:      Chest wall: No tenderness.   Abdominal:      General: Bowel sounds are normal. There is no distension.      Palpations: Abdomen is soft.      Tenderness: There is no abdominal tenderness. There is no  guarding or rebound.   Musculoskeletal:         General: No tenderness or deformity. Normal range of motion.      Cervical back: Normal range of motion and neck supple.   Skin:     General: Skin is warm and dry.      Coloration: Skin is not pale.      Findings: No erythema or rash.   Neurological:      Mental Status: He is alert and oriented to person, place, and time.      Cranial Nerves: No cranial nerve deficit.      Motor: No abnormal muscle tone.      Coordination: Coordination normal.      Deep Tendon Reflexes: Reflexes are normal and symmetric.      Comments: Sleeping, arouses easily    Psychiatric:         Behavior: Behavior normal.         Thought Content: Thought content normal.                Significant Labs: All pertinent labs within the past 24 hours have been reviewed.    Significant Imaging: I have reviewed all pertinent imaging results/findings within the past 24 hours.  Assessment/Plan:     * Near syncope  Likely 2/2 over-diuresis/antihypertensives  Hold anti hypertensive medications (Coreg, Lasix, Entresto, and Amlodipine) for now   Pt received 250ml IVFs in ED   Will give another 250ml over 4 hours   Serial troponin   Consult Cardiology   Monitor        Hypotension  Se plan above       MAHAD (acute kidney injury)  Patient with acute kidney injury/acute renal failure likely due to pre-renal azotemia due to IVVD MAHAD is currently worsening. Baseline creatinine  1.0  - Labs reviewed- Renal function/electrolytes with Estimated Creatinine Clearance: 61.4 mL/min (A) (based on SCr of 1.6 mg/dL (H)). according to latest data. Monitor urine output and serial BMP and adjust therapy as needed. Avoid nephrotoxins and renally dose meds for GFR listed above.    Type 2 diabetes mellitus, without long-term current use of insulin  Patient's FSGs are controlled on current medication regimen.  Last A1c reviewed-   Lab Results   Component Value Date    HGBA1C 6.8 (H) 02/02/2023     Most recent fingerstick glucose  reviewed-   Recent Labs   Lab 02/10/24  0536 02/10/24  1044 02/10/24  1520   POCTGLUCOSE 143* 201* 148*     Current correctional scale  Low  Maintain anti-hyperglycemic dose as follows-   Antihyperglycemics (From admission, onward)      Start     Stop Route Frequency Ordered    02/11/24 0603  insulin aspart U-100 pen 0-5 Units         -- SubQ Before meals & nightly PRN 02/11/24 0504          Hold Oral hypoglycemics while patient is in the hospital.       Chronic combined systolic and diastolic congestive heart failure  Patient is identified as having Combined Systolic and Diastolic heart failure that is Chronic. CHF is currently controlled. Latest ECHO performed and demonstrates- Results for orders placed during the hospital encounter of 02/06/24    Echo    Interpretation Summary    Left Ventricle: The left ventricle is moderately dilated. Normal wall thickness. Severe global hypokinesis present. There is severely reduced systolic function with a visually estimated ejection fraction of 25 - 30%. Grade II diastolic dysfunction.    Right Ventricle: Normal right ventricular cavity size. Wall thickness is normal. Right ventricle wall motion  is normal. Systolic function is mildly reduced.    Left Atrium: Left atrium is moderately dilated.    Right Atrium: Right atrium is mildly dilated.    Aortic Valve: There is mild aortic regurgitation.    Mitral Valve: There is mild regurgitation.    Tricuspid Valve: There is mild regurgitation.    Pulmonary Artery: The estimated pulmonary artery systolic pressure is 47 mmHg.    IVC/SVC: Elevated venous pressure at 15 mmHg.  .Monitor on telemetry. Patient is on CHF pathway.  Monitor strict Is&Os and daily weights.  Place on fluid restriction of 1.5 L. Cardiology has been consulted. Continue to stress to patient importance of self efficacy and  on diet for CHF. Last BNP reviewed- and noted below   Recent Labs   Lab 02/11/24  0235   *         VTE Risk Mitigation (From  admission, onward)           Ordered     enoxaparin injection 40 mg  Daily         02/11/24 0458     IP VTE HIGH RISK PATIENT  Once         02/11/24 0458     Place sequential compression device  Until discontinued         02/11/24 0458                         On 02/11/2024, patient should be placed in hospital observation services under my care in collaboration with Dr. Stevens.           Elba Carbone NP  Department of Hospital Medicine  [facility] - Emergency Dept.

## 2024-02-11 NOTE — SUBJECTIVE & OBJECTIVE
Past Medical History:   Diagnosis Date    CAD (coronary artery disease)     Carotid artery dissection     CHF (congestive heart failure)     Gout, unspecified     HLD (hyperlipidemia)     HTN (hypertension)     Hyperparathyroidism, unspecified     JAMES (obstructive sleep apnea)     Prostate cancer     SAH (subarachnoid hemorrhage) 01/2023    Stroke 2020    Type 2 diabetes mellitus without complications        Past Surgical History:   Procedure Laterality Date    ANGIOGRAM, CAROTID      ANGIOGRAM, VERTEBRAL      CATHETERIZATION OF BOTH LEFT AND RIGHT HEART N/A 2/8/2024    Procedure: CATHETERIZATION, HEART, BOTH LEFT AND RIGHT;  Surgeon: Genie Claros MD;  Location: [facility] CATH LAB;  Service: Cardiology;  Laterality: N/A;    PARATHYROIDECTOMY         Review of patient's allergies indicates:   Allergen Reactions    Statins-hmg-coa reductase inhibitors Other (See Comments)       Current Facility-Administered Medications on File Prior to Encounter   Medication    [DISCONTINUED] acetaminophen tablet 650 mg    [DISCONTINUED] amLODIPine tablet 2.5 mg    [DISCONTINUED] aspirin chewable tablet 81 mg    [DISCONTINUED] carvediloL tablet 12.5 mg    [DISCONTINUED] clopidogreL tablet 75 mg    [DISCONTINUED] furosemide injection 40 mg    [DISCONTINUED] heparin (porcine) injection 5,000 Units    [DISCONTINUED] labetaloL injection 10 mg    [DISCONTINUED] lactated ringers bolus 250 mL    [DISCONTINUED] lactated ringers bolus 250 mL    [DISCONTINUED] levothyroxine tablet 25 mcg    [DISCONTINUED] melatonin tablet 6 mg    [DISCONTINUED] metOLazone tablet 5 mg    [DISCONTINUED] ondansetron disintegrating tablet 8 mg    [DISCONTINUED] ondansetron injection 4 mg    [DISCONTINUED] sacubitriL-valsartan 49-51 mg per tablet 1 tablet    [DISCONTINUED] senna-docusate 8.6-50 mg per tablet 1 tablet    [DISCONTINUED] sodium chloride 0.9% flush 10 mL     Current Outpatient Medications on File Prior to Encounter   Medication Sig    allopurinoL  (ZYLOPRIM) 100 MG tablet Take 100 mg by mouth daily as needed.    amLODIPine (NORVASC) 2.5 MG tablet Take 1 tablet (2.5 mg total) by mouth once daily.    aspirin 81 mg Cap Take 81 mg by mouth once daily.    carvediloL (COREG) 12.5 MG tablet Take 1 tablet (12.5 mg total) by mouth 2 (two) times daily.    cholecalciferol, vitamin D3, (VITAMIN D3) 50 mcg (2,000 unit) Cap capsule Take 1 capsule by mouth once daily.    clopidogreL (PLAVIX) 75 mg tablet Take 1 tablet (75 mg total) by mouth once daily.    DOCOSAHEXAENOIC ACID ORAL Take 1 capsule by mouth once daily.    empagliflozin (JARDIANCE) 25 mg tablet Take 1 tablet by mouth once daily.    furosemide (LASIX) 40 MG tablet Take 1 tablet (40 mg total) by mouth once daily.    levothyroxine (SYNTHROID) 25 MCG tablet Take 1 tablet by mouth every morning.    magnesium oxide (MAG-OX) 250 mg magnesium Tab Take 1 tablet by mouth every morning.    metFORMIN (GLUCOPHAGE) 1000 MG tablet Take 1,000 mg by mouth 2 (two) times daily with meals.    sacubitriL-valsartan (ENTRESTO) 49-51 mg per tablet Take 1 tablet by mouth 2 (two) times daily.     Family History       Problem Relation (Age of Onset)    Diabetes Mother    Hypertension Mother, Father          Tobacco Use    Smoking status: Never    Smokeless tobacco: Not on file   Substance and Sexual Activity    Alcohol use: Never    Drug use: Never    Sexual activity: Not on file     Review of Systems   Cardiovascular:  Negative for chest pain, dyspnea on exertion, palpitations and syncope.   Genitourinary: Negative.    Neurological: Negative.      Objective:     Vital Signs (Most Recent):  Temp: 97.8 °F (36.6 °C) (02/11/24 1000)  Pulse: 70 (02/11/24 1030)  Resp: 20 (02/11/24 1030)  BP: 98/61 (02/11/24 1030)  SpO2: 96 % (02/11/24 1030) Vital Signs (24h Range):  Temp:  [97.7 °F (36.5 °C)-98.4 °F (36.9 °C)] 97.8 °F (36.6 °C)  Pulse:  [60-80] 70  Resp:  [18-27] 20  SpO2:  [94 %-99 %] 96 %  BP: ()/(53-75) 98/61     Weight: 125.9 kg  (277 lb 9 oz)  Body mass index is 36.62 kg/m².    SpO2: 96 %         Intake/Output Summary (Last 24 hours) at 2/11/2024 1059  Last data filed at 2/11/2024 0356  Gross per 24 hour   Intake 250 ml   Output --   Net 250 ml       Lines/Drains/Airways       Peripheral Intravenous Line  Duration                  Peripheral IV - Double Lumen 02/11/24 0309 16 G Anterior;Distal;Right Forearm <1 day         Peripheral IV - Single Lumen 02/11/24 0233 18 G Anterior;Right Forearm <1 day                     Physical Exam  Vitals reviewed.   Constitutional:       Appearance: He is well-developed.   Neck:      Vascular: No carotid bruit.   Cardiovascular:      Rate and Rhythm: Normal rate and regular rhythm.      Pulses: Intact distal pulses.      Heart sounds: Normal heart sounds. No murmur heard.  Pulmonary:      Breath sounds: Normal breath sounds.   Neurological:      Mental Status: He is oriented to person, place, and time.          Significant Labs: Troponin   Recent Labs   Lab 02/11/24  0258   TROPONINI 0.090*   , All pertinent lab results from the last 24 hours have been reviewed., and   Recent Lab Results  (Last 5 results in the past 24 hours)        02/11/24  0950   02/11/24  0733   02/11/24  0258   02/11/24  0246   02/11/24  0235        Albumin     3.3           ALP     72           Allens Test       Pass         ALT     29           Anion Gap     18           Appearance, UA Clear               AST     28           Bacteria, UA Rare               Baso #         0.02       Basophil %         0.5       Bilirubin (UA) Negative               BILIRUBIN TOTAL     2.0  Comment: For infants and newborns, interpretation of results should be based  on gestational age, weight and in agreement with clinical  observations.    Premature Infant recommended reference ranges:  Up to 24 hours.............<8.0 mg/dL  Up to 48 hours............<12.0 mg/dL  3-5 days..................<15.0 mg/dL  6-29 days.................<15.0 mg/dL              BNP         131  Comment: Values of less than 100 pg/ml are consistent with non-CHF populations.       Site       RR         BUN     20           Calcium     9.4           Chloride     96           CO2     24           Color, UA Yellow               Creatinine     1.6           DelSys       NRB         Differential Method         Automated       eGFR     47           Eos #         0.1       Eos %         3.1       FiO2       100         Flow       6         Glucose     162           Glucose, UA Negative               Gran # (ANC)         2.3       Gran %         54.2       Hematocrit         57.3       Hemoglobin         19.3       Hyaline Casts, UA 34               Immature Grans (Abs)         0.02  Comment: Mild elevation in immature granulocytes is non specific and   can be seen in a variety of conditions including stress response,   acute inflammation, trauma and pregnancy. Correlation with other   laboratory and clinical findings is essential.         Immature Granulocytes         0.5       Ketones, UA Negative               Lactic Acid Level         2.2  Comment: Falsely low lactic acid results can be found in samples   containing >=13.0 mg/dL total bilirubin and/or >=3.5 mg/dL   direct bilirubin.         Leukocyte Esterase, UA Negative               Lipase     36           Lymph #         1.2       Lymph %         29.7       Magnesium      1.8           MCH         29.6       MCHC         33.7       MCV         88       Microscopic Comment SEE COMMENT  Comment: Other formed elements not mentioned in the report are not   present in the microscopic examination.                  Mode       SPONT         Mono #         0.5       Mono %         12.0       MPV         11.0       NITRITE UA Negative               nRBC         0       Blood, UA Negative               pH, UA 6.0               Platelet Count         226       POC BE       8         POC HCO3       32.8         POC PCO2       53.4         POC PH        7.396         POC PO2       303         POC SATURATED O2       100         POCT Glucose   140             Potassium     4.0           PROTEIN TOTAL     7.6           Protein, UA 1+  Comment: Recommend a 24 hour urine protein or a urine   protein/creatinine ratio if globulin induced proteinuria is  clinically suspected.                 RBC         6.52       RBC, UA 0               RDW         18.1       Sample       ARTERIAL         Sodium     138           Specific Gravity, UA 1.020               Specimen UA Urine, Catheterized               Troponin I     0.090  Comment: The reference interval for Troponin I represents the 99th percentile   cutoff   for our facility and is consistent with 3rd generation assay   performance.             UROBILINOGEN UA Negative               WBC Clumps, UA Occasional               WBC, UA 3               WBC         4.18                              Significant Imaging: Results for orders placed during the hospital encounter of 02/06/24    Cardiac catheterization    Conclusion    The ejection fraction was calculated to be 25%.    The pre-procedure left ventricular end diastolic pressure was 29.    The post-procedure left ventricular end diastolic pressure was 33.    The estimated blood loss was none.    There was non-obstructive coronary artery disease..    The filling pressures on the right and left were moderately elevated. Pulmonary hypertension was moderate.    Non ischemic dilated cardiomyopathy    The procedure log was documented by Documenter: Nahomi Reddy RN and verified by Genie Claros MD.    Date: 2/8/2024  Time: 5:21 PM

## 2024-02-11 NOTE — ASSESSMENT & PLAN NOTE
Patient's FSGs are controlled on current medication regimen.  Last A1c reviewed-   Lab Results   Component Value Date    HGBA1C 6.8 (H) 02/02/2023     Most recent fingerstick glucose reviewed-   Recent Labs   Lab 02/10/24  0536 02/10/24  1044 02/10/24  1520   POCTGLUCOSE 143* 201* 148*     Current correctional scale  Low  Maintain anti-hyperglycemic dose as follows-   Antihyperglycemics (From admission, onward)      Start     Stop Route Frequency Ordered    02/11/24 0603  insulin aspart U-100 pen 0-5 Units         -- SubQ Before meals & nightly PRN 02/11/24 0504          Hold Oral hypoglycemics while patient is in the hospital.

## 2024-02-11 NOTE — ED PROVIDER NOTES
ED Provider Note - 2/11/2024    History     Chief Complaint   Patient presents with    Respiratory Distress     Pt to ER via EMS for evaluation of respiratory distress and lethargy; pt was discharged from [facility] yesterday after being admitted for aprox 1 week; pts wife pt was found unresponsive sitting in restroom; EMS reports that on arrival pt was diaphoretic and lethargic with GCS of 9; pt currently AAOx3 with GCS of 15; ; pt arrived on 10L O2 via NRB       HPI     Ron Matthew is a 68 y.o. year old male with past medical and surgical history as seen below, presenting with chief complaint of respiratory distress. Per EMS, pt was discharged from [facility] yesterday after being admitted for 1 week. Tonight, pt's wife found him unresponsive in the restroom. When EMS arrived, pt was diaphoretic and lethargic with a GCS of 9. Pt also experienced 1 episode of vomiting PTA. Pt's symptoms have improved and he is back to his normal mental status. Pt states he has been consistent with his BP medication.    Associated symptoms include vomiting, hypotension, lethargy, and diaphoresis.    Patient denies Chest pain, SOB, and Abdominal pain.    Past Medical History:   Diagnosis Date    CAD (coronary artery disease)     Carotid artery dissection     CHF (congestive heart failure)     Gout, unspecified     HLD (hyperlipidemia)     HTN (hypertension)     Hyperparathyroidism, unspecified     JAMES (obstructive sleep apnea)     Prostate cancer     SAH (subarachnoid hemorrhage) 01/2023    Stroke 2020    Type 2 diabetes mellitus without complications      Past Surgical History:   Procedure Laterality Date    ANGIOGRAM, CAROTID      ANGIOGRAM, VERTEBRAL      CATHETERIZATION OF BOTH LEFT AND RIGHT HEART N/A 2/8/2024    Procedure: CATHETERIZATION, HEART, BOTH LEFT AND RIGHT;  Surgeon: Genie Claros MD;  Location: [facility] CATH LAB;  Service: Cardiology;  Laterality: N/A;    PARATHYROIDECTOMY           Family History   Problem  Relation Age of Onset    Diabetes Mother     Hypertension Mother     Hypertension Father      Social History     Tobacco Use    Smoking status: Never   Substance Use Topics    Alcohol use: Never    Drug use: Never     Social Determinants of Health with Concerns     Tobacco Use: Unknown (2/9/2024)    Patient History     Smoking Tobacco Use: Never     Smokeless Tobacco Use: Unknown     Passive Exposure: Not on file   Alcohol Use: Not on file   Financial Resource Strain: Not on file   Food Insecurity: Not on file   Transportation Needs: Not on file   Physical Activity: Not on file   Stress: Not on file   Social Connections: Not on file   Housing Stability: Not on file   Depression: Not on file      Review of patient's allergies indicates:   Allergen Reactions    Statins-hmg-coa reductase inhibitors Other (See Comments)       Review of Systems     A full Review of Systems (ROS) was performed and was negative unless otherwise stated in the HPI.      Physical Exam     Vitals:    02/11/24 0420 02/11/24 0435 02/11/24 0445 02/11/24 0500   BP: (!) 89/57   108/73   Pulse: 66 66 65 69   Resp: (!) 22 (!) 21 (!) 21 19   Temp:       TempSrc:       SpO2: 97% 96% 96% 98%   Weight:            Physical Exam    Nursing note and vitals reviewed.  Constitutional: He appears well-developed and well-nourished. He is not diaphoretic. No distress.   HENT:   Head: Normocephalic and atraumatic.   Right Ear: External ear normal.   Left Ear: External ear normal.   Nose: Nose normal.   Mouth/Throat: Oropharynx is clear and moist.   Eyes: Conjunctivae and EOM are normal. Pupils are equal, round, and reactive to light.   Neck: Neck supple.   Normal range of motion.  Cardiovascular:  Normal rate, regular rhythm and normal heart sounds.           No murmur heard.  Pulmonary/Chest: Breath sounds normal. No stridor. No respiratory distress. He has no wheezes. He has no rhonchi. He has no rales.   Abdominal: Abdomen is soft. Bowel sounds are normal.  There is no abdominal tenderness.   Musculoskeletal:         General: No edema. Normal range of motion.      Cervical back: Normal range of motion and neck supple. Normal.      Thoracic back: Normal.      Lumbar back: Normal.     Neurological: He is alert and oriented to person, place, and time. He has normal strength. No cranial nerve deficit or sensory deficit.   Skin: Skin is warm and dry. No rash noted. No pallor.   Psychiatric: He has a normal mood and affect. Thought content normal.   Normal mental status         Lab Results- Independently reviewed by myself      Labs Reviewed   CBC W/ AUTO DIFFERENTIAL - Abnormal; Notable for the following components:       Result Value    RBC 6.52 (*)     Hemoglobin 19.3 (*)     Hematocrit 57.3 (*)     RDW 18.1 (*)     All other components within normal limits   B-TYPE NATRIURETIC PEPTIDE - Abnormal; Notable for the following components:     (*)     All other components within normal limits   COMPREHENSIVE METABOLIC PANEL - Abnormal; Notable for the following components:    Glucose 162 (*)     Creatinine 1.6 (*)     Albumin 3.3 (*)     Total Bilirubin 2.0 (*)     eGFR 47 (*)     Anion Gap 18 (*)     All other components within normal limits   TROPONIN I - Abnormal; Notable for the following components:    Troponin I 0.090 (*)     All other components within normal limits   ISTAT PROCEDURE - Abnormal; Notable for the following components:    POC PCO2 53.4 (*)     POC PO2 303 (*)     POC HCO3 32.8 (*)     POC BE 8 (*)     All other components within normal limits   CULTURE, BLOOD   CULTURE, BLOOD   LACTIC ACID, PLASMA   MAGNESIUM   LIPASE   LACTIC ACID, PLASMA   URINALYSIS, REFLEX TO URINE CULTURE   HEMOGLOBIN A1C   POCT GLUCOSE MONITORING CONTINUOUS           Imaging     Imaging Results              X-Ray Chest AP Portable (Final result)  Result time 02/11/24 07:26:30      Final result by Cruz Plummer III, MD (02/11/24 07:26:30)                   Impression:       Stable cardiomegaly.  No acute pulmonary findings.      Electronically signed by: Cruz Plummer MD  Date:    02/11/2024  Time:    07:26               Narrative:    EXAMINATION:  XR CHEST AP PORTABLE    CLINICAL HISTORY:  Sepsis;    FINDINGS:  Comparison is made with the most recent prior chest x-ray.  Stable cardiomegaly.  Chest wall electrodes, leads and battery devices are noted..  The lungs appear clear of active disease. No acute appearing infiltrate, pleural effusion or pneumothorax identified.                                    X-Rays:   Independently Interpreted Readings:   Chest X-Ray: No infiltrates. Cardiomegaly present.      EKG Readings: (Independently Interpreted)   Sinus rhythm  1st degree AV block  Right axis  Rate 74  No STEMI           ED Course         Critical Care    Date/Time: 2/11/2024 4:02 AM    Performed by: Jim Gillespie MD  Authorized by: Jim Gillespie MD  Direct patient critical care time: 12 minutes  Additional history critical care time: 7 minutes  Ordering / reviewing critical care time: 7 minutes  Documentation critical care time: 8 minutes  Total critical care time (exclusive of procedural time) : 34 minutes  Critical care time was exclusive of separately billable procedures and treating other patients and teaching time.  Critical care was necessary to treat or prevent imminent or life-threatening deterioration of the following conditions: Hypotension.  Critical care was time spent personally by me on the following activities: blood draw for specimens, discussions with consultants, interpretation of cardiac output measurements, evaluation of patient's response to treatment, examination of patient, obtaining history from patient or surrogate, ordering and review of laboratory studies, ordering and performing treatments and interventions, ordering and review of radiographic studies, pulse oximetry, re-evaluation of patient's condition and review of old charts.                Orders Placed This Encounter    Blood culture x two cultures. Draw prior to antibiotics.    X-Ray Chest AP Portable    CBC auto differential    Lactic acid, plasma #1    Lactic acid, plasma #2    Urinalysis, Reflex to Urine Culture Urine, Clean Catch    Brain natriuretic peptide    Comprehensive metabolic panel    Magnesium    Lipase    Troponin I    Basic metabolic panel    Hemoglobin A1c if not done in past 3 months    Diet diabetic Fluid - 1500mL    ED Preference List Used to Initiate Sepsis Orders    Vital signs    Strict intake and output    Vital signs    Cardiac Monitoring - Adult    Fluid restriction    Height and weight On admission. Standing Weight Method required.    Daily weights On presentation to floor. Standing Weight Method required.    Strict intake and output 1.5 Liters maximum per 24 hours.    Strict intake and output 1.5 Liters maximum per 24 hours.    Notify Physician    Notify Physician - Potential Need of Opioid Reversal    Place sequential compression device    If any glucose result is less than 50 or greater than 400:    If 2nd result is less than 50 or greater than 400:    If glucose greater than 400 mg/dL treat per correction scale.  If glucose remains elevated above 400 mg/dL at next scheduled check, notify provider    Do not admin Aspart correction between scheduled prandial Aspart    Recheck Blood Glucose:    Full code    Inpatient consult to Cardiology    POCT ARTERIAL BLOOD GAS Blood Gas    Pulse Oximetry Q4H    EKG 12-lead    Saline lock IV    Possible Hospitalization    Place in Observation    Fall precautions    ISTAT PROCEDURE    POCT glucose    sodium chloride 0.9% bolus 250 mL 250 mL    sodium chloride 0.9% bolus 250 mL 250 mL    aspirin EC tablet 81 mg    clopidogreL tablet 75 mg    levothyroxine tablet 25 mcg    sodium chloride 0.9% flush 10 mL    enoxaparin injection 40 mg    ondansetron injection 4 mg    senna-docusate 8.6-50 mg per tablet 1 tablet    glucose  chewable tablet 16 g    glucose chewable tablet 24 g    glucagon (human recombinant) injection 1 mg    insulin aspart U-100 pen 0-5 Units    dextrose 10% bolus 125 mL 125 mL    dextrose 10% bolus 250 mL 250 mL    IP VTE HIGH RISK PATIENT    Bed rest    Ambulate With Assistance          ED Course as of 02/11/24 0505   Sun Feb 11, 2024   0222 Cardiac catheterization     Conclusion  ·  The ejection fraction was calculated to be 25%.  ·  The pre-procedure left ventricular end diastolic pressure was 29.  ·  The post-procedure left ventricular end diastolic pressure was 33.  ·  The estimated blood loss was none.  ·  There was non-obstructive coronary artery disease..  ·  The filling pressures on the right and left were moderately elevated. Pulmonary hypertension was moderate.  ·  Non ischemic dilated cardiomyopathy     The procedure log was documented by Documenter: Nahomi Reddy RN and verified by Genie Claros MD.     Date: 2/8/2024  Time: 5:21 PM   [KB]   0254 POC PCO2(!): 53.4  Similar to prior, doubt CO2 narcosis as cause of symptoms [KB]   0343 Creatinine(!): 1.6  Acute increase from recent values near 1.0 [KB]   0343 Troponin I(!): 0.090  Slight increase from prior values [KB]   0349 BILIRUBIN TOTAL(!): 2.0  Similar to prior [KB]      ED Course User Index  [KB] Jim Gillespie MD              Medical Decision Making       The patient's list of active medical problems, social history, medications, and allergies as documented per RN staff has been reviewed.     Comorbidities taken into consideration for development of diagnosis and treatment plan include CAD, CHF, DM, HTN, HLD, and subarachnoid hemorrhage, stroke, JAMES, hyperparathyroidism, and carotid artery dissection.      Medical Decision Making  68-year-old male presents for evaluation after episode of diaphoresis, decreased mental status, and hypotension at time of EMS arrival.  Still hypotensive at arrival to the emergency department but improved with  small aliquots of 250 cc IVF.  Differential includes but not limited to ACS, PE, CHF, sepsis, antihypertensive medication effect, dehydration, MAHAD, amongst many others.  Discussed with Hospital Medicine to continue patient's evaluation and management.    Amount and/or Complexity of Data Reviewed  External Data Reviewed: labs, radiology and ECG.  Labs: ordered. Decision-making details documented in ED Course.  Radiology: ordered and independent interpretation performed. Decision-making details documented in ED Course.  ECG/medicine tests: ordered and independent interpretation performed. Decision-making details documented in ED Course.    Risk  Decision regarding hospitalization.                    4:45 AM: Discussed case with Elba Carbone NP ([facility]). Elba Carbone NP agrees with current care and management of pt and accepts admission.   Admitting Service: Hospital Medicine  Admitting Physician: Dr. Stevens  Admit to: Obs med/tele           Clinical Impression         Disposition   ED Disposition Condition    Observation             Diagnosis    ICD-10-CM ICD-9-CM   1. MAHAD (acute kidney injury)  N17.9 584.9   2. AMS (altered mental status)  R41.82 780.97   3. Syncope  R55 780.2   4. Near syncope  R55 780.2   5. Chronic combined systolic and diastolic congestive heart failure  I50.42 428.42     428.0           Jim Gillespie MD        02/11/2024          DISCLAIMER: This note was prepared with [employer] voice recognition transcription software. Garbled syntax, mangled pronouns, and other bizarre constructions may be attributed to that software system.      Jim Gillespie MD  02/12/24 4909

## 2024-02-11 NOTE — HPI
69 yo male with CAD, CHF-EF 25%, DM, HTN, HLD, Gout, JAMES, Prostate cancer, Stroke with right sided hemiparesis in 2020, Carotid dissection and SAH 1/2023 who presented to ED with near syncope. The patient was just discharged from hospital yesterday for CHF exacerbation. He was discharged on Lasix 40mg daily and Entresto (new Rx). The pt's wife states he took his night time Coreg at approx 2130 and went to bed. Ge got up to use the bathroom. After he had a BM, he was diaphoretic and unresponsive. When EMS arrived, pt was diaphoretic and lethargic with a GCS of 9. Pt also experienced 1 episode of vomiting PTA. Pt's symptoms have improved and he is back to his normal mental status.      In the ED, BP 78/56. Oxygenation normal. Labs revealed Hct 57, mild MAHAD with sCr 1.6. BNP has improved from 1238>131. Troponin 0.090. EKG showed NSR with 1st degree Av block with TWI. CXR- clear          Upon my interview.  Is awake and oriented.    He passed out after he stood up in the bathroom.    BNP seen to have corrected from 1200 to 130.  He appears euvolemic   Shows MAHAD on his blood work.    Of note he was borderline hypertensive during his hospital visit, and was switched from lisinopril to lower equivalent dose of Entresto after 36 hours washout.

## 2024-02-11 NOTE — CARE UPDATE
Evaluated patient at bedside  Wife present    Recounts symptoms leading up to this admission  Diaphoresis, confusion and fatigue  Hypotensive on admission  Responded to fluids  Patient eating salty peanuts    No acute distress  No respiratory distress, on room air  Soft nontender abdomen on palpation  AO3  No lower extremity edema   Wearing lifevest  Obese     Counseled on fluid and salt restrictions  Hypotension improved  Repeat bmp in am  Hold lasix, entresto, coreg and amlodipine  Cardiology recommending switching coreg to metoprolol  Resume meds as indicated  Cardiology following    Physical/occupational therapy pending

## 2024-02-11 NOTE — ASSESSMENT & PLAN NOTE
Patient with acute kidney injury/acute renal failure likely due to pre-renal azotemia due to IVVD MAHAD is currently worsening. Baseline creatinine  1.0  - Labs reviewed- Renal function/electrolytes with Estimated Creatinine Clearance: 61.4 mL/min (A) (based on SCr of 1.6 mg/dL (H)). according to latest data. Monitor urine output and serial BMP and adjust therapy as needed. Avoid nephrotoxins and renally dose meds for GFR listed above.

## 2024-02-11 NOTE — HOSPITAL COURSE
Cardiology consulted to assist with management. PT/OT rec low intensity therapy on discharge. Orthostatics +. Diuretics, entresto discontinued with improvement in BP. Systolics remained in low 100s, pt had improvement in fatigue/dizziness. Low dose toprol added after discussion with cardiology. Pt seen and examined on day of discharge and appears stable for discharge home today with HH. Discussed with cardiology, will continue toprol 12.5, lasix as needed and close outpatient followup with cardiology for further medication management/adjustment. Discharge instruction and return precautions discussed at length with wife and patient, all questions answered to their satisfaction. Followup with PCP and cardiology within 1 week.

## 2024-02-11 NOTE — ASSESSMENT & PLAN NOTE
Patient is identified as having Combined Systolic and Diastolic heart failure that is Chronic. CHF is currently controlled. Latest ECHO performed and demonstrates- Results for orders placed during the hospital encounter of 02/06/24    Echo    Interpretation Summary    Left Ventricle: The left ventricle is moderately dilated. Normal wall thickness. Severe global hypokinesis present. There is severely reduced systolic function with a visually estimated ejection fraction of 25 - 30%. Grade II diastolic dysfunction.    Right Ventricle: Normal right ventricular cavity size. Wall thickness is normal. Right ventricle wall motion  is normal. Systolic function is mildly reduced.    Left Atrium: Left atrium is moderately dilated.    Right Atrium: Right atrium is mildly dilated.    Aortic Valve: There is mild aortic regurgitation.    Mitral Valve: There is mild regurgitation.    Tricuspid Valve: There is mild regurgitation.    Pulmonary Artery: The estimated pulmonary artery systolic pressure is 47 mmHg.    IVC/SVC: Elevated venous pressure at 15 mmHg.  .Monitor on telemetry. Patient is on CHF pathway.  Monitor strict Is&Os and daily weights.  Place on fluid restriction of 1.5 L. Cardiology has been consulted. Continue to stress to patient importance of self efficacy and  on diet for CHF. Last BNP reviewed- and noted below   Recent Labs   Lab 02/11/24  0235   *

## 2024-02-11 NOTE — HPI
The patient is a 67 yo male with CAD, CHF-EF 25%, DM, HTN, HLD, Gout, JAMES, Prostate cancer, Stroke with right sided hemiparesis in 2020, Carotid dissection and SAH 1/2023 who presented to ED with near syncope. The patient was just discharged from hospital yesterday for CHF exacerbation. He was discharged on Lasix 40mg daily and Entresto (new Rx). The pt's wife states he took his night time Coreg at approx 2130 and went to bed. Ge got up to use the bathroom. After he had a BM, he was diaphoretic and unresponsive. When EMS arrived, pt was diaphoretic and lethargic with a GCS of 9. Pt also experienced 1 episode of vomiting PTA. Pt's symptoms have improved and he is back to his normal mental status.     In the ED, BP 78/56. Lab revealed Hct 57, mild MAHAD with sCr 1.6. BNP has improved from 1238>131. Troponin 0.090. EKG showed NSR with 1st degree Av block with TWI. CXR- clear

## 2024-02-11 NOTE — PHARMACY MED REC
"Admission Medication History     The home medication history was taken by Mary Johnston.    You may go to "Admission" then "Reconcile Home Medications" tabs to review and/or act upon these items.     The home medication list has been updated by the Pharmacy department.   Please read ALL comments highlighted in yellow.   Please address this information as you see fit.    Feel free to contact us if you have any questions or require assistance.      Medications listed below were obtained from: Patient/family and Analytic software- Giles,family member at bedside  (Not in a hospital admission)      Mary Payneson  [id number]      Current Outpatient Medications on File Prior to Encounter   Medication Sig Dispense Refill Last Dose    allopurinoL (ZYLOPRIM) 100 MG tablet Take 100 mg by mouth daily as needed.   Past Week    amLODIPine (NORVASC) 2.5 MG tablet Take 1 tablet (2.5 mg total) by mouth once daily. 30 tablet 0 Past Week    aspirin 81 mg Cap Take 81 mg by mouth once daily. 30 capsule 0 Past Week    carvediloL (COREG) 12.5 MG tablet Take 1 tablet (12.5 mg total) by mouth 2 (two) times daily. 60 tablet 0 2/10/2024    cholecalciferol, vitamin D3, (VITAMIN D3) 50 mcg (2,000 unit) Cap capsule Take 1 capsule by mouth once daily.   Past Week    clopidogreL (PLAVIX) 75 mg tablet Take 1 tablet (75 mg total) by mouth once daily. 30 tablet 0 Past Week    DOCOSAHEXAENOIC ACID ORAL Take 1 capsule by mouth once daily.   Past Week    empagliflozin (JARDIANCE) 25 mg tablet Take 1 tablet by mouth once daily.   Past Week    furosemide (LASIX) 40 MG tablet Take 1 tablet (40 mg total) by mouth once daily. 30 tablet 0 Past Week    levothyroxine (SYNTHROID) 25 MCG tablet Take 1 tablet by mouth every morning.   Past Week    magnesium oxide (MAG-OX) 250 mg magnesium Tab Take 1 tablet by mouth every morning.   Past Week    metFORMIN (GLUCOPHAGE) 1000 MG tablet Take 1,000 mg by mouth 2 (two) times daily with meals.   Past Week    " sacubitriL-valsartan (ENTRESTO) 49-51 mg per tablet Take 1 tablet by mouth 2 (two) times daily. 60 tablet 0 Past Week                           .

## 2024-02-11 NOTE — ASSESSMENT & PLAN NOTE
Likely 2/2 over-diuresis/antihypertensives  Hold anti hypertensive medications (Coreg, Lasix, Entresto, and Amlodipine) for now   Pt received 250ml IVFs in ED   Will give another 250ml over 4 hours   Serial troponin   Consult Cardiology   Monitor

## 2024-02-11 NOTE — PLAN OF CARE
[facility] - Telemetry (Hospital)  Discharge Assessment    Primary Care Provider: [facility], [facility]     Discharge Assessment (most recent)       BRIEF DISCHARGE ASSESSMENT - 02/11/24 9558          Discharge Planning    Assessment Type Discharge Planning Brief Assessment     Resource/Environmental Concerns none     Support Systems Spouse/significant other     Equipment Currently Used at Home CPAP;walker, rolling   lifevest    Current Living Arrangements home     Patient/Family Anticipates Transition to home with family     Patient/Family Anticipated Services at Transition none     DME Needed Upon Discharge  none     Discharge Plan A Home with family                     Discharged home 2/10.  Per wife, patient lost consciousness last night, brought to hospital by EMS.

## 2024-02-11 NOTE — SUBJECTIVE & OBJECTIVE
Past Medical History:   Diagnosis Date    CAD (coronary artery disease)     Carotid artery dissection     CHF (congestive heart failure)     Gout, unspecified     HLD (hyperlipidemia)     HTN (hypertension)     Hyperparathyroidism, unspecified     JAMES (obstructive sleep apnea)     Prostate cancer     SAH (subarachnoid hemorrhage) 01/2023    Stroke 2020    Type 2 diabetes mellitus without complications        Past Surgical History:   Procedure Laterality Date    ANGIOGRAM, CAROTID      ANGIOGRAM, VERTEBRAL      CATHETERIZATION OF BOTH LEFT AND RIGHT HEART N/A 2/8/2024    Procedure: CATHETERIZATION, HEART, BOTH LEFT AND RIGHT;  Surgeon: Genie Claros MD;  Location: [facility] CATH LAB;  Service: Cardiology;  Laterality: N/A;    PARATHYROIDECTOMY         Review of patient's allergies indicates:   Allergen Reactions    Statins-hmg-coa reductase inhibitors Other (See Comments)       Current Facility-Administered Medications on File Prior to Encounter   Medication    [COMPLETED] potassium chloride SA CR tablet 40 mEq    [DISCONTINUED] acetaminophen tablet 650 mg    [DISCONTINUED] amLODIPine tablet 2.5 mg    [DISCONTINUED] aspirin chewable tablet 81 mg    [DISCONTINUED] carvediloL tablet 12.5 mg    [DISCONTINUED] clopidogreL tablet 75 mg    [DISCONTINUED] furosemide injection 40 mg    [DISCONTINUED] heparin (porcine) injection 5,000 Units    [DISCONTINUED] labetaloL injection 10 mg    [DISCONTINUED] lactated ringers bolus 250 mL    [DISCONTINUED] lactated ringers bolus 250 mL    [DISCONTINUED] levothyroxine tablet 25 mcg    [DISCONTINUED] melatonin tablet 6 mg    [DISCONTINUED] metOLazone tablet 5 mg    [DISCONTINUED] ondansetron disintegrating tablet 8 mg    [DISCONTINUED] ondansetron injection 4 mg    [DISCONTINUED] sacubitriL-valsartan 49-51 mg per tablet 1 tablet    [DISCONTINUED] senna-docusate 8.6-50 mg per tablet 1 tablet    [DISCONTINUED] sodium chloride 0.9% flush 10 mL     Current Outpatient Medications on File  Prior to Encounter   Medication Sig    allopurinoL (ZYLOPRIM) 100 MG tablet Take 100 mg by mouth daily as needed.    amLODIPine (NORVASC) 2.5 MG tablet Take 1 tablet (2.5 mg total) by mouth once daily.    aspirin 81 mg Cap Take 81 mg by mouth once daily.    carvediloL (COREG) 12.5 MG tablet Take 1 tablet (12.5 mg total) by mouth 2 (two) times daily.    cholecalciferol, vitamin D3, (VITAMIN D3) 50 mcg (2,000 unit) Cap capsule Take 1 capsule by mouth once daily.    clopidogreL (PLAVIX) 75 mg tablet Take 1 tablet (75 mg total) by mouth once daily.    DOCOSAHEXAENOIC ACID ORAL Take 1 capsule by mouth once daily.    empagliflozin (JARDIANCE) 25 mg tablet Take 1 tablet by mouth once daily.    furosemide (LASIX) 40 MG tablet Take 1 tablet (40 mg total) by mouth once daily.    levothyroxine (SYNTHROID) 25 MCG tablet Take 1 tablet by mouth every morning.    magnesium oxide (MAG-OX) 250 mg magnesium Tab Take 1 tablet by mouth every morning.    metFORMIN (GLUCOPHAGE) 1000 MG tablet Take 1,000 mg by mouth 2 (two) times daily with meals.    sacubitriL-valsartan (ENTRESTO) 49-51 mg per tablet Take 1 tablet by mouth 2 (two) times daily.     Family History       Problem Relation (Age of Onset)    Diabetes Mother    Hypertension Mother, Father          Tobacco Use    Smoking status: Never    Smokeless tobacco: Not on file   Substance and Sexual Activity    Alcohol use: Never    Drug use: Never    Sexual activity: Not on file     Review of Systems   Constitutional:  Positive for activity change. Negative for appetite change, chills, diaphoresis, fatigue and fever.   HENT:  Negative for congestion, nosebleeds, sore throat and trouble swallowing.    Eyes:  Negative for pain, discharge and visual disturbance.   Respiratory:  Negative for apnea, cough, chest tightness, shortness of breath, wheezing and stridor.    Cardiovascular:  Negative for chest pain, palpitations and leg swelling.   Gastrointestinal:  Negative for abdominal  distention, abdominal pain, blood in stool, constipation, diarrhea, nausea and vomiting.   Endocrine: Negative for cold intolerance and heat intolerance.   Genitourinary:  Negative for difficulty urinating, dysuria, flank pain, frequency and urgency.   Musculoskeletal:  Negative for arthralgias, back pain, joint swelling, myalgias, neck pain and neck stiffness.   Skin:  Negative for rash and wound.   Allergic/Immunologic: Negative for food allergies and immunocompromised state.   Neurological:  Positive for dizziness and syncope (near syncope). Negative for seizures, facial asymmetry, weakness, light-headedness and headaches.   Hematological:  Negative for adenopathy.   Psychiatric/Behavioral:  Negative for agitation, behavioral problems and confusion. The patient is not nervous/anxious.      Objective:     Vital Signs (Most Recent):  Temp: 98.2 °F (36.8 °C) (02/11/24 0300)  Pulse: 69 (02/11/24 0500)  Resp: 19 (02/11/24 0500)  BP: 108/73 (02/11/24 0500)  SpO2: 98 % (02/11/24 0500) Vital Signs (24h Range):  Temp:  [97.8 °F (36.6 °C)-98.2 °F (36.8 °C)] 98.2 °F (36.8 °C)  Pulse:  [60-80] 69  Resp:  [18-26] 19  SpO2:  [94 %-99 %] 98 %  BP: ()/(53-75) 108/73     Weight: 125.9 kg (277 lb 9 oz)  Body mass index is 36.62 kg/m².     Physical Exam  Vitals and nursing note reviewed.   Constitutional:       General: He is not in acute distress.     Appearance: He is well-developed. He is not diaphoretic.   HENT:      Head: Normocephalic and atraumatic.      Nose: Nose normal.   Eyes:      General: No scleral icterus.     Conjunctiva/sclera: Conjunctivae normal.   Cardiovascular:      Rate and Rhythm: Normal rate and regular rhythm.      Heart sounds: Normal heart sounds. No murmur heard.     No friction rub. No gallop.      Comments: Life vest in place   Pulmonary:      Effort: Pulmonary effort is normal. No respiratory distress.      Breath sounds: Normal breath sounds. No stridor. No wheezing or rales.   Chest:       Chest wall: No tenderness.   Abdominal:      General: Bowel sounds are normal. There is no distension.      Palpations: Abdomen is soft.      Tenderness: There is no abdominal tenderness. There is no guarding or rebound.   Musculoskeletal:         General: No tenderness or deformity. Normal range of motion.      Cervical back: Normal range of motion and neck supple.   Skin:     General: Skin is warm and dry.      Coloration: Skin is not pale.      Findings: No erythema or rash.   Neurological:      Mental Status: He is alert and oriented to person, place, and time.      Cranial Nerves: No cranial nerve deficit.      Motor: No abnormal muscle tone.      Coordination: Coordination normal.      Deep Tendon Reflexes: Reflexes are normal and symmetric.      Comments: Sleeping, arouses easily    Psychiatric:         Behavior: Behavior normal.         Thought Content: Thought content normal.                Significant Labs: All pertinent labs within the past 24 hours have been reviewed.    Significant Imaging: I have reviewed all pertinent imaging results/findings within the past 24 hours.

## 2024-02-11 NOTE — DISCHARGE SUMMARY
[facility] - Emergency Dept.  Hospital Medicine  Discharge Summary      Patient Name: Ron Matthew  MRN: [id number]  NOHEMI: [id number]  Patient Class: OP- Observation  Admission Date: 2/11/2024  Hospital Length of Stay: 0 days  Discharge Date and Time: 2/11/2024  Attending Physician: Rodriguez Sotelo MD   Discharging Provider: Heidi Hernandez MD  Primary Care Provider: Administration, [facility]    Primary Care Team: Networked reference to record PCT     HPI:    The patient is a 67 yo male with CAD, CHF-EF 45%, DM, HTN, HLD, Gout, JAMES, Prostate cancer, Stroke with right sided hemiparesis in 2020, Carotid dissection and SAH 1/2023 who presented to ED with SOB, BLE edema, and orthopnea x 5 days. Spouse reports he has not slept for several days due SOB. He did complain of chest pain yesterday but it resolved.      In the ED, BP mildly elevated, Afebrile, Oxygenation stable. +tachypnea. WBC 3.8, BNP 1238, Troponin 0.109. EKG-NSR with PACs, prolonged QT, no ischemia. CXR- Cardiomegaly. Pulmonary congestion.      * No surgery found *      Hospital Course:   patient is a 67 yo male with CAD, CHF, DM, HTN, HLD, Gout, JAMES, Prostate cancer, Stroke with right sided hemiparesis in 2020, Carotid dissection and SAH 1/2023 who presented to ED with SOB, BLE edema, and orthopnea x 5 days.      Admitted under Hospital Medicine for acute on chronic combined systolic and diastolic heart failure.    Echo as of today with ejection fraction 25-30% EF.  Plan to continue IV diuretics, Coreg, lisinopril, cardiology plans for possible right/left heart catheterization on 02/08/2024,      s/p [facility] nonobstructive disease EF noted to be 25% on angiogram as of 2/9/24    On 02/10/2024, examination done at bedside, appeared alert and oriented x3, denied acute issues overnight.    Blood pressure is slightly trended down after receiving morning medications, responded to IV fluid bolus.  Denied further dizziness, chest pain, shortness OO breath,  palpitations, ambulated without issues.  Blood pressure stabilized.  Patient has been on IV Lasix 40 b.i.d., metolazone during hospital stay.  Discussed with Cardiology, stated okay for discharge on Lasix 40 mg once daily, Entresto, LifeVest, outpatient follow up.     worked on arrangements for LifeVest.    Considering clinical and hemodynamic stability, planning to discharge patient today, emphasized noncompliance with medications, outpatient follow up with PCP/Cardiology, patient/wife at bedside agreed.         Goals of Care Treatment Preferences:  Code Status: Full Code      Consults:   Consults (From admission, onward)          Status Ordering Provider     Inpatient consult to Cardiology  Once        Provider:  Genie Claros MD    Acknowledged SRAVANI SHELTON            No new Assessment & Plan notes have been filed under this hospital service since the last note was generated.  Service: Hospital Medicine    Final Active Diagnoses:    Diagnosis Date Noted POA    PRINCIPAL PROBLEM:  Near syncope [R55] 02/11/2024 Yes    Hypotension [I95.9] 02/11/2024 Yes    MAHAD (acute kidney injury) [N17.9] 02/11/2024 Yes    Chronic combined systolic and diastolic congestive heart failure [I50.42] 02/06/2024 Yes    Type 2 diabetes mellitus, without long-term current use of insulin [E11.9] 02/06/2024 Yes      Problems Resolved During this Admission:       Discharged Condition: good    Disposition:     Follow Up:    Patient Instructions:   No discharge procedures on file.    Significant Diagnostic Studies:     Results for orders placed or performed during the hospital encounter of 02/11/24   CBC auto differential   Result Value Ref Range    WBC 4.18 3.90 - 12.70 K/uL    RBC 6.52 (H) 4.60 - 6.20 M/uL    Hemoglobin 19.3 (H) 14.0 - 18.0 g/dL    Hematocrit 57.3 (H) 40.0 - 54.0 %    MCV 88 82 - 98 fL    MCH 29.6 27.0 - 31.0 pg    MCHC 33.7 32.0 - 36.0 g/dL    RDW 18.1 (H) 11.5 - 14.5 %    Platelets 226 150 - 450 K/uL     MPV 11.0 9.2 - 12.9 fL    Immature Granulocytes 0.5 0.0 - 0.5 %    Gran # (ANC) 2.3 1.8 - 7.7 K/uL    Immature Grans (Abs) 0.02 0.00 - 0.04 K/uL    Lymph # 1.2 1.0 - 4.8 K/uL    Mono # 0.5 0.3 - 1.0 K/uL    Eos # 0.1 0.0 - 0.5 K/uL    Baso # 0.02 0.00 - 0.20 K/uL    nRBC 0 0 /100 WBC    Gran % 54.2 38.0 - 73.0 %    Lymph % 29.7 18.0 - 48.0 %    Mono % 12.0 4.0 - 15.0 %    Eosinophil % 3.1 0.0 - 8.0 %    Basophil % 0.5 0.0 - 1.9 %    Differential Method Automated    Lactic acid, plasma #1   Result Value Ref Range    Lactate (Lactic Acid) 2.2 0.5 - 2.2 mmol/L   Brain natriuretic peptide   Result Value Ref Range     (H) 0 - 99 pg/mL   Comprehensive metabolic panel   Result Value Ref Range    Sodium 138 136 - 145 mmol/L    Potassium 4.0 3.5 - 5.1 mmol/L    Chloride 96 95 - 110 mmol/L    CO2 24 23 - 29 mmol/L    Glucose 162 (H) 70 - 110 mg/dL    BUN 20 8 - 23 mg/dL    Creatinine 1.6 (H) 0.5 - 1.4 mg/dL    Calcium 9.4 8.7 - 10.5 mg/dL    Total Protein 7.6 6.0 - 8.4 g/dL    Albumin 3.3 (L) 3.5 - 5.2 g/dL    Total Bilirubin 2.0 (H) 0.1 - 1.0 mg/dL    Alkaline Phosphatase 72 55 - 135 U/L    AST 28 10 - 40 U/L    ALT 29 10 - 44 U/L    eGFR 47 (A) >60 mL/min/1.73 m^2    Anion Gap 18 (H) 8 - 16 mmol/L   Magnesium   Result Value Ref Range    Magnesium 1.8 1.6 - 2.6 mg/dL   Lipase   Result Value Ref Range    Lipase 36 4 - 60 U/L   Troponin I   Result Value Ref Range    Troponin I 0.090 (H) 0.000 - 0.026 ng/mL   ISTAT PROCEDURE   Result Value Ref Range    POC PH 7.396 7.35 - 7.45    POC PCO2 53.4 (H) 35 - 45 mmHg    POC PO2 303 (H) 80 - 100 mmHg    POC HCO3 32.8 (H) 24 - 28 mmol/L    POC BE 8 (H) -2 to 2 mmol/L    POC SATURATED O2 100 95 - 100 %    Sample ARTERIAL     Site RR     Allens Test Pass     DelSys NRB     Mode SPONT     Flow 6     FiO2 100         Imaging Results              X-Ray Chest AP Portable (Final result)  Result time 02/11/24 07:26:30      Final result by Cruz Plummer III, MD (02/11/24 07:26:30)                    Impression:      Stable cardiomegaly.  No acute pulmonary findings.      Electronically signed by: Cruz Plummer MD  Date:    02/11/2024  Time:    07:26               Narrative:    EXAMINATION:  XR CHEST AP PORTABLE    CLINICAL HISTORY:  Sepsis;    FINDINGS:  Comparison is made with the most recent prior chest x-ray.  Stable cardiomegaly.  Chest wall electrodes, leads and battery devices are noted..  The lungs appear clear of active disease. No acute appearing infiltrate, pleural effusion or pneumothorax identified.                                       Pending Diagnostic Studies:       Procedure Component Value Units Date/Time    Hemoglobin A1c if not done in past 3 months [[id number]] Collected: 02/11/24 0647    Order Status: Sent Lab Status: In process Updated: 02/11/24 0647    Specimen: Blood            Medications:       Medication List        ASK your doctor about these medications      allopurinoL 100 MG tablet  Commonly known as: ZYLOPRIM     amLODIPine 2.5 MG tablet  Commonly known as: NORVASC  Take 1 tablet (2.5 mg total) by mouth once daily.     aspirin 81 mg Cap  Take 81 mg by mouth once daily.     carvediloL 12.5 MG tablet  Commonly known as: COREG  Take 1 tablet (12.5 mg total) by mouth 2 (two) times daily.     cholecalciferol (vitamin D3) 50 mcg (2,000 unit) Cap capsule  Commonly known as: VITAMIN D3     clopidogreL 75 mg tablet  Commonly known as: PLAVIX  Take 1 tablet (75 mg total) by mouth once daily.     DOCOSAHEXAENOIC ACID ORAL     empagliflozin 25 mg tablet  Commonly known as: Jardiance     furosemide 40 MG tablet  Commonly known as: LASIX  Take 1 tablet (40 mg total) by mouth once daily.     levothyroxine 25 MCG tablet  Commonly known as: SYNTHROID     magnesium oxide 250 mg magnesium Tab  Commonly known as: MAG-OX     metFORMIN 1000 MG tablet  Commonly known as: GLUCOPHAGE     sacubitriL-valsartan 49-51 mg per tablet  Commonly known as: ENTRESTO  Take 1 tablet by mouth 2 (two)  times daily.               Indwelling Lines/Drains at time of discharge:   Lines/Drains/Airways       None                   Time spent on the discharge of patient: 87 minutes         Heidi Hernandez MD  Department of Hospital Medicine  [facility] - Emergency Dept.

## 2024-02-11 NOTE — PT/OT/SLP PROGRESS
Occupational Therapy      Patient Name:  Ron Matthew   MRN:  [id number]    Patient not seen today secondary to Therapist assessment (Pt hospitalized for orthostasis had a supine BP of 89/59.). Will follow-up at next opportunity.    2/11/2024

## 2024-02-11 NOTE — ASSESSMENT & PLAN NOTE
No signs of fluid overload.    Possibly hypovolemic may be over diuresed on his last visit.    Hold off on Lasix for now.    Hold Entresto given his MAHAD.    Currently off beta-blockers as blood pressure currently is 90's/60.    When more stable we will change his carvedilol to Toprol 25 mg XL.    And will decrease his Entresto to lower the b.i.d..  Wife is not sure if he had a blue ink discharge from the device  We will get it checked.

## 2024-02-12 LAB
ANION GAP SERPL CALC-SCNC: 13 MMOL/L (ref 8–16)
BASOPHILS # BLD AUTO: 0.03 K/UL (ref 0–0.2)
BASOPHILS NFR BLD: 0.8 % (ref 0–1.9)
BUN SERPL-MCNC: 25 MG/DL (ref 8–23)
CALCIUM SERPL-MCNC: 8.5 MG/DL (ref 8.7–10.5)
CHLORIDE SERPL-SCNC: 98 MMOL/L (ref 95–110)
CO2 SERPL-SCNC: 26 MMOL/L (ref 23–29)
CREAT SERPL-MCNC: 1.3 MG/DL (ref 0.5–1.4)
DIFFERENTIAL METHOD BLD: ABNORMAL
EOSINOPHIL # BLD AUTO: 0.2 K/UL (ref 0–0.5)
EOSINOPHIL NFR BLD: 4.2 % (ref 0–8)
ERYTHROCYTE [DISTWIDTH] IN BLOOD BY AUTOMATED COUNT: 17.3 % (ref 11.5–14.5)
EST. GFR  (NO RACE VARIABLE): 60 ML/MIN/1.73 M^2
GLUCOSE SERPL-MCNC: 128 MG/DL (ref 70–110)
HCT VFR BLD AUTO: 52.2 % (ref 40–54)
HGB BLD-MCNC: 17.4 G/DL (ref 14–18)
IMM GRANULOCYTES # BLD AUTO: 0.01 K/UL (ref 0–0.04)
IMM GRANULOCYTES NFR BLD AUTO: 0.3 % (ref 0–0.5)
LACTATE SERPL-SCNC: 1.1 MMOL/L (ref 0.5–2.2)
LYMPHOCYTES # BLD AUTO: 1.1 K/UL (ref 1–4.8)
LYMPHOCYTES NFR BLD: 29.4 % (ref 18–48)
MCH RBC QN AUTO: 29.2 PG (ref 27–31)
MCHC RBC AUTO-ENTMCNC: 33.3 G/DL (ref 32–36)
MCV RBC AUTO: 88 FL (ref 82–98)
MONOCYTES # BLD AUTO: 0.7 K/UL (ref 0.3–1)
MONOCYTES NFR BLD: 19.3 % (ref 4–15)
NEUTROPHILS # BLD AUTO: 1.7 K/UL (ref 1.8–7.7)
NEUTROPHILS NFR BLD: 46 % (ref 38–73)
NRBC BLD-RTO: 0 /100 WBC
PLATELET # BLD AUTO: 193 K/UL (ref 150–450)
PMV BLD AUTO: 11 FL (ref 9.2–12.9)
POCT GLUCOSE: 115 MG/DL (ref 70–110)
POCT GLUCOSE: 131 MG/DL (ref 70–110)
POCT GLUCOSE: 151 MG/DL (ref 70–110)
POCT GLUCOSE: 183 MG/DL (ref 70–110)
POTASSIUM SERPL-SCNC: 3.6 MMOL/L (ref 3.5–5.1)
RBC # BLD AUTO: 5.95 M/UL (ref 4.6–6.2)
SODIUM SERPL-SCNC: 137 MMOL/L (ref 136–145)
WBC # BLD AUTO: 3.78 K/UL (ref 3.9–12.7)

## 2024-02-12 PROCEDURE — 36415 COLL VENOUS BLD VENIPUNCTURE: CPT | Performed by: NURSE PRACTITIONER

## 2024-02-12 PROCEDURE — 99232 SBSQ HOSP IP/OBS MODERATE 35: CPT | Mod: ,,, | Performed by: PHYSICIAN ASSISTANT

## 2024-02-12 PROCEDURE — 25000003 PHARM REV CODE 250: Performed by: NURSE PRACTITIONER

## 2024-02-12 PROCEDURE — 97166 OT EVAL MOD COMPLEX 45 MIN: CPT

## 2024-02-12 PROCEDURE — 80048 BASIC METABOLIC PNL TOTAL CA: CPT | Performed by: INTERNAL MEDICINE

## 2024-02-12 PROCEDURE — 36415 COLL VENOUS BLD VENIPUNCTURE: CPT | Mod: XB | Performed by: INTERNAL MEDICINE

## 2024-02-12 PROCEDURE — 21400001 HC TELEMETRY ROOM

## 2024-02-12 PROCEDURE — 97530 THERAPEUTIC ACTIVITIES: CPT

## 2024-02-12 PROCEDURE — 85025 COMPLETE CBC W/AUTO DIFF WBC: CPT | Performed by: INTERNAL MEDICINE

## 2024-02-12 PROCEDURE — 83605 ASSAY OF LACTIC ACID: CPT | Performed by: PHYSICIAN ASSISTANT

## 2024-02-12 PROCEDURE — 25000003 PHARM REV CODE 250: Performed by: INTERNAL MEDICINE

## 2024-02-12 PROCEDURE — 97162 PT EVAL MOD COMPLEX 30 MIN: CPT

## 2024-02-12 PROCEDURE — 63600175 PHARM REV CODE 636 W HCPCS: Performed by: NURSE PRACTITIONER

## 2024-02-12 RX ORDER — POTASSIUM CHLORIDE 20 MEQ/1
40 TABLET, EXTENDED RELEASE ORAL ONCE
Status: COMPLETED | OUTPATIENT
Start: 2024-02-12 | End: 2024-02-12

## 2024-02-12 RX ADMIN — ASPIRIN 81 MG: 81 TABLET, COATED ORAL at 08:02

## 2024-02-12 RX ADMIN — POTASSIUM CHLORIDE 40 MEQ: 1500 TABLET, EXTENDED RELEASE ORAL at 02:02

## 2024-02-12 RX ADMIN — ENOXAPARIN SODIUM 40 MG: 40 INJECTION SUBCUTANEOUS at 04:02

## 2024-02-12 RX ADMIN — DOCUSATE SODIUM AND SENNOSIDES 1 TABLET: 8.6; 5 TABLET, FILM COATED ORAL at 08:02

## 2024-02-12 RX ADMIN — CLOPIDOGREL BISULFATE 75 MG: 75 TABLET ORAL at 08:02

## 2024-02-12 RX ADMIN — LEVOTHYROXINE SODIUM 25 MCG: 0.03 TABLET ORAL at 06:02

## 2024-02-12 NOTE — ASSESSMENT & PLAN NOTE
No signs of fluid overload.    Possibly hypovolemic may be over diuresed on his last visit.    Hold off on Lasix for now.    Hold Entresto given his MAHAD.    Currently off beta-blockers as blood pressure currently is 90's/60.    When more stable we will change his carvedilol to Toprol 25 mg XL.    And will decrease his Entresto to lower the b.i.d..  Wife is not sure if he had a blue ink discharge from the device  We will get it checked.      2/12/24  -Stable, compensated  -No SOB  -Creatinine improving  -Recommend low dose Toprol XL 25 mg once able to tolerate  -Lasix prn  -Can re-initiate Entresto as OP once BP/creatinine stable

## 2024-02-12 NOTE — ASSESSMENT & PLAN NOTE
Patient with acute kidney injury/acute renal failure likely due to pre-renal azotemia due to IVVD MAHAD is currently improving. Baseline creatinine  1.0  - Labs reviewed- Renal function/electrolytes with Estimated Creatinine Clearance: 72.9 mL/min (based on SCr of 1.3 mg/dL). according to latest data. Monitor urine output and serial BMP and adjust therapy as needed. Avoid nephrotoxins and renally dose meds for GFR listed above.  Cr improved with gentle hydration and withholding diuretics   Hold home diuretic for now   Monitor BMP

## 2024-02-12 NOTE — HOSPITAL COURSE
2/12/24-Patient seen and examined today, resting in bed with wife present. Feels ok. No SOB/CP. BP still soft, but in low 100's this AM. Creatinine 1.3, trending down.    2/13/24-Patient seen and examined today, resting in bed. No AEON. Did notice some mild dizziness when walking to the bathroom. No near syncope/syncope. Denies CP/SOB. Creatinine 1.3. Systolic BP low 100's, low dose Toprol XL initiated. Discussed with wife will need to closely monitor BP at home and f/u in clinic.

## 2024-02-12 NOTE — ASSESSMENT & PLAN NOTE
Likely 2/2 over-diuresis/antihypertensives  Hold anti hypertensive medications (Coreg, Lasix, Entresto, and Amlodipine) for now   S/p gentle IV hydration on admission   Cardiology consulted   Will resume B blocker per cards recs as BP tolerates   Cont PT/OT

## 2024-02-12 NOTE — PLAN OF CARE
PT EVAL complete. Required CGA for bed mobility, ambulated 15ft x2 MIN A using RW. Recommending low intensity therapy upon d/c.

## 2024-02-12 NOTE — SUBJECTIVE & OBJECTIVE
Review of Systems   Constitutional: Positive for malaise/fatigue.   HENT: Negative.     Eyes: Negative.    Cardiovascular: Negative.    Respiratory: Negative.     Endocrine: Negative.    Hematologic/Lymphatic: Negative.    Musculoskeletal: Negative.    Gastrointestinal: Negative.    Genitourinary: Negative.    Neurological: Negative.    Psychiatric/Behavioral: Negative.     Allergic/Immunologic: Negative.      Objective:     Vital Signs (Most Recent):  Temp: 98 °F (36.7 °C) (02/12/24 0726)  Pulse: 75 (02/12/24 0826)  Resp: 18 (02/12/24 0726)  BP: 107/60 (02/12/24 0726)  SpO2: (!) 94 % (02/12/24 0726) Vital Signs (24h Range):  Temp:  [97.2 °F (36.2 °C)-98.7 °F (37.1 °C)] 98 °F (36.7 °C)  Pulse:  [70-89] 75  Resp:  [17-24] 18  SpO2:  [92 %-99 %] 94 %  BP: ()/(60-77) 107/60     Weight: 124.1 kg (273 lb 9.5 oz)  Body mass index is 38.16 kg/m².     SpO2: (!) 94 %         Intake/Output Summary (Last 24 hours) at 2/12/2024 1029  Last data filed at 2/12/2024 0845  Gross per 24 hour   Intake 240 ml   Output --   Net 240 ml       Lines/Drains/Airways       Peripheral Intravenous Line  Duration                  Peripheral IV - Double Lumen 02/11/24 0309 16 G Anterior;Distal;Right Forearm 1 day         Peripheral IV - Single Lumen 02/11/24 0233 18 G Anterior;Right Forearm 1 day                       Physical Exam  Vitals and nursing note reviewed.   Constitutional:       General: He is not in acute distress.     Appearance: Normal appearance. He is well-developed. He is not diaphoretic.   HENT:      Head: Normocephalic and atraumatic.   Eyes:      General:         Right eye: No discharge.         Left eye: No discharge.      Pupils: Pupils are equal, round, and reactive to light.   Cardiovascular:      Rate and Rhythm: Normal rate and regular rhythm.      Heart sounds: Normal heart sounds, S1 normal and S2 normal. No murmur heard.  Pulmonary:      Effort: Pulmonary effort is normal. No respiratory distress.       Breath sounds: Normal breath sounds. No wheezing or rales.   Abdominal:      General: There is no distension.   Musculoskeletal:      Right lower leg: No edema.      Left lower leg: No edema.   Skin:     General: Skin is warm and dry.      Findings: No erythema.   Neurological:      General: No focal deficit present.      Mental Status: He is alert and oriented to person, place, and time.   Psychiatric:         Mood and Affect: Mood normal.         Behavior: Behavior normal.         Thought Content: Thought content normal.            Significant Labs: CMP   Recent Labs   Lab 02/11/24  0258 02/12/24  0812    137   K 4.0 3.6   CL 96 98   CO2 24 26   * 128*   BUN 20 25*   CREATININE 1.6* 1.3   CALCIUM 9.4 8.5*   PROT 7.6  --    ALBUMIN 3.3*  --    BILITOT 2.0*  --    ALKPHOS 72  --    AST 28  --    ALT 29  --    ANIONGAP 18* 13   , CBC   Recent Labs   Lab 02/11/24  0235 02/12/24 0812   WBC 4.18 3.78*   HGB 19.3* 17.4   HCT 57.3* 52.2    193   , Troponin   Recent Labs   Lab 02/11/24 0258   TROPONINI 0.090*   , and All pertinent lab results from the last 24 hours have been reviewed.    Significant Imaging: Echocardiogram: Transthoracic echo (TTE) complete (Cupid Only):   Results for orders placed or performed during the hospital encounter of 02/06/24   Echo   Result Value Ref Range    BSA 2.69 m2    Greene's Biplane MOD Ejection Fraction 31 %    LVOT stroke volume 45.85 cm3    LVIDd 6.07 (A) 3.5 - 6.0 cm    LV Systolic Volume 150.23 mL    LV Systolic Volume Index 58.0 mL/m2    LVIDs 5.55 (A) 2.1 - 4.0 cm    LV Diastolic Volume 184.97 mL    LV Diastolic Volume Index 71.42 mL/m2    IVS 1.52 (A) 0.6 - 1.1 cm    LVOT diameter 2.08 cm    LVOT area 3.4 cm2    FS 9 (A) 28 - 44 %    Left Ventricle Relative Wall Thickness 0.50 cm    Posterior Wall 1.52 (A) 0.6 - 1.1 cm    LV mass 443.56 g    LV Mass Index 171 g/m2    MV Peak E Simon 0.77 m/s    TDI LATERAL 0.09 m/s    TDI SEPTAL 0.07 m/s    E/E' ratio 9.63  m/s    MV Peak A Simon 0.31 m/s    TR Max Simon 2.82 m/s    E/A ratio 2.48     IVRT 60.89 msec    E wave deceleration time 119.06 msec    LV SEPTAL E/E' RATIO 11.00 m/s    LV LATERAL E/E' RATIO 8.56 m/s    LVOT peak simon 0.73 m/s    Left Ventricular Outflow Tract Mean Velocity 0.63 cm/s    Left Ventricular Outflow Tract Mean Gradient 1.63 mmHg    RVDD 2.96 cm    RVOT peak VTI 7.1 cm    TAPSE 1.64 cm    LA size 4.93 cm    Left Atrium Minor Axis 5.85 cm    Left Atrium Major Axis 6.08 cm    RA Major Axis 5.15 cm    AV regurgitation pressure 1/2 time 878.203103428223974 ms    AR Max Simon 1.88 m/s    AV mean gradient 5 mmHg    AV peak gradient 8 mmHg    Ao peak simon 1.37 m/s    Ao VTI 25.10 cm    LVOT peak VTI 13.50 cm    AV valve area 1.83 cm²    AV Velocity Ratio 0.53     AV index (prosthetic) 0.54     GINGER by Velocity Ratio 1.81 cm²    Mr max simon 3.61 m/s    MV stenosis pressure 1/2 time 34.53 ms    MV valve area p 1/2 method 6.37 cm2    TV mean gradient 32 mmHg    Triscuspid Valve Regurgitation Peak Gradient 32 mmHg    PV mean gradient 1 mmHg    RVOT peak simon 0.49 m/s    Ao root annulus 3.49 cm    STJ 3.66 cm    Ascending aorta 3.62 cm    IVC diameter 2.65 cm    Mean e' 0.08 m/s    ZLVIDS -5.06     ZLVIDD -10.82     LA Volume Index 42.4 mL/m2    LA volume 109.94 cm3    LA WIDTH 4.4 cm    RA Width 3.3 cm    TV resting pulmonary artery pressure 47 mmHg    RV TB RVSP 18 mmHg    Est. RA pres 15 mmHg    Narrative      Left Ventricle: The left ventricle is moderately dilated. Normal wall   thickness. Severe global hypokinesis present. There is severely reduced   systolic function with a visually estimated ejection fraction of 25 - 30%.   Grade II diastolic dysfunction.    Right Ventricle: Normal right ventricular cavity size. Wall thickness   is normal. Right ventricle wall motion  is normal. Systolic function is   mildly reduced.    Left Atrium: Left atrium is moderately dilated.    Right Atrium: Right atrium is mildly dilated.     Aortic Valve: There is mild aortic regurgitation.    Mitral Valve: There is mild regurgitation.    Tricuspid Valve: There is mild regurgitation.    Pulmonary Artery: The estimated pulmonary artery systolic pressure is   47 mmHg.    IVC/SVC: Elevated venous pressure at 15 mmHg.     , EKG: Reviewed, and X-Ray: CXR: X-Ray Chest 1 View (CXR): No results found for this visit on 02/11/24. and X-Ray Chest PA and Lateral (CXR): No results found for this visit on 02/11/24.

## 2024-02-12 NOTE — ASSESSMENT & PLAN NOTE
Patient is identified as having Combined Systolic and Diastolic heart failure that is Chronic. CHF is currently controlled. Latest ECHO performed and demonstrates- Results for orders placed during the hospital encounter of 02/06/24    Echo    Interpretation Summary    Left Ventricle: The left ventricle is moderately dilated. Normal wall thickness. Severe global hypokinesis present. There is severely reduced systolic function with a visually estimated ejection fraction of 25 - 30%. Grade II diastolic dysfunction.    Right Ventricle: Normal right ventricular cavity size. Wall thickness is normal. Right ventricle wall motion  is normal. Systolic function is mildly reduced.    Left Atrium: Left atrium is moderately dilated.    Right Atrium: Right atrium is mildly dilated.    Aortic Valve: There is mild aortic regurgitation.    Mitral Valve: There is mild regurgitation.    Tricuspid Valve: There is mild regurgitation.    Pulmonary Artery: The estimated pulmonary artery systolic pressure is 47 mmHg.    IVC/SVC: Elevated venous pressure at 15 mmHg.  .Monitor on telemetry. Patient is on CHF pathway.  Monitor strict Is&Os and daily weights.  Place on fluid restriction of 1.5 L. Cardiology has been consulted. Continue to stress to patient importance of self efficacy and  on diet for CHF. Last BNP reviewed- and noted below   Recent Labs   Lab 02/11/24  0235   *

## 2024-02-12 NOTE — PT/OT/SLP EVAL
"Occupational Therapy Evaluation and Treatment    Name: Ron Matthew  MRN: [id number]  Admitting Diagnosis: Near syncope  Recent Surgery: * No surgery found *      Recommendations:     Discharge Recommendations: Low Intensity Therapy  Level of Assistance Recommended: 24 hours light assistance  Discharge Equipment Recommendations: none  Barriers to discharge: None    Assessment:     Ron Matthew is a 68 y.o. male with a medical diagnosis of Near syncope. He presents with performance deficits affecting function including weakness, impaired endurance, impaired self care skills, impaired functional mobility, impaired balance, impaired cardiopulmonary response to activity.    Rehab Prognosis: Good; patient would benefit from acute OT services to address these deficits and reach maximum level of function.    Plan:     Patient to be seen 2 x/week to address the above listed problems via self-care/home management, therapeutic activities, therapeutic exercises  Plan of Care Expires: 02/26/24  Plan of Care Reviewed with: patient, spouse    Subjective     Chief Complaint: Reported "I feel weak."  Patient Comments/Goals: none reported  Pain/Comfort:  Pain Rating 1: 0/10    Social History:  Living Environment: Patient lives with their spouse in a 2 story home with  1st floor BR/BA. No steps/stairs to enter.  Prior Level of Function: Prior to admission, patient was modified independent with ADLs and community distance ambulation (using 4WW vs SPC).  Roles and Routines: Patient was driving and not working prior to admission.  Equipment Used at Home: rollator, cane, straight, grab bar, shower chair  DME owned (not currently used): wheelchair  Assistance Upon Discharge: family    Objective:     Communicated with nurse, Kimberly, prior to session. Patient found supine with peripheral IV, telemetry (lifevest) upon OT entry to room.    General Precautions: Standard, fall (monitor BP- hypotensive)   Orthopedic " Precautions: N/A   Braces: N/A    Respiratory Status: Room air    Occupational Performance    Gait belt applied - Yes    Bed Mobility:   Supine to sit from left side of bed with stand by assistance    Functional Mobility/Transfers:  Sit <> Stand Transfer with stand by assistance with no AD  Bed <> Chair Transfer using Step Transfer technique with contact guard assistance with no AD  Functional Mobility: Patient completed x25ft functional mobility with CGA to intermittent min HHA to increase dynamic standing balance and activity tolerance needed for ADL completion.  Provided with v/c for technique with transfers to increase safety and independence with completion.    Activities of Daily Living:  Grooming: set up assistance  Upper Body Dressing: set up assistance    Cognitive/Visual Perceptual:  Cognitive/Psychosocial Skills:    -     Oriented to: Person, Place, Time, Situation  -     Follows Commands/attention: Follows two-step commands    Physical Exam:  Balance:    -     Sitting: modified independence  -     Standing: contact guard assistance  Upper Extremity Range of Motion:     -       Right Upper Extremity: WFL  -       Left Upper Extremity: WFL  Upper Extremity Strength:    -       Right Upper Extremity: Deficits: grossly 4+/5  -       Left Upper Extremity: Deficits: grossly 4+/5   Strength:    -       Right Upper Extremity: WFL  -       Left Upper Extremity: WFL    AMPAC 6 Click ADL:  AMPAC Total Score: 21    Treatment & Education:  Therapist provided facilitation and instruction of proper body mechanics, energy conservation, and fall prevention strategies during tasks listed above  Patient educated on role of OT, POC, and goals for therapy  Patient educated on importance of OOB activities with staff member assistance and sitting OOB majority of the day  Encouraged completion of B UE AROM therex throughout the day to increase functional strength and activity tolerance needed for ADL completion.      BP AS  FOLLOWS:  Supine 98/61 (75)  Sitting 87/63 (69)  Sitting x3 minutes 88/51 (64)  Standing 91/56 (68)  After ambulation 104/55 (71)    Patient left up in chair with all lines intact, call button in reach, chair alarm on, and spouse present.    GOALS:   Multidisciplinary Problems       Occupational Therapy Goals          Problem: Occupational Therapy    Goal Priority Disciplines Outcome Interventions   Occupational Therapy Goal     OT, PT/OT     Description: Goals to be met by: 2/26/24     Patient will increase functional independence with ADLs by performing:    Toileting from toilet with Supervision for hygiene and clothing management.   Toilet transfer to toilet with Supervision.  Increased functional strength in B UE grossly by 1/2 MM grade.                         History:     Past Medical History:   Diagnosis Date    CAD (coronary artery disease)     Carotid artery dissection     CHF (congestive heart failure)     Gout, unspecified     HLD (hyperlipidemia)     HTN (hypertension)     Hyperparathyroidism, unspecified     JAMES (obstructive sleep apnea)     Prostate cancer     SAH (subarachnoid hemorrhage) 01/2023    Stroke 2020    Type 2 diabetes mellitus without complications          Past Surgical History:   Procedure Laterality Date    ANGIOGRAM, CAROTID      ANGIOGRAM, VERTEBRAL      CATHETERIZATION OF BOTH LEFT AND RIGHT HEART N/A 2/8/2024    Procedure: CATHETERIZATION, HEART, BOTH LEFT AND RIGHT;  Surgeon: Genie Claros MD;  Location: [facility] CATH LAB;  Service: Cardiology;  Laterality: N/A;    PARATHYROIDECTOMY         Time Tracking:     OT Date of Treatment: 02/12/24  OT Start Time: 1050  OT Stop Time: 1115  OT Total Time (min): 25 min    Billable Minutes: Evaluation 15 and Therapeutic Activity 10    Magalie Alvarez OT  2/12/2024

## 2024-02-12 NOTE — PROGRESS NOTES
[facility] ([facility])  Cardiology  Progress Note    Patient Name: Ron Matthew  MRN: [id number]  Admission Date: 2/11/2024  Hospital Length of Stay: 0 days  Code Status: Full Code   Attending Physician: Loretta Valadez MD   Primary Care Physician: Cheri Dietz  Expected Discharge Date:   Principal Problem:Near syncope    Subjective:   HPI:  67 yo male with CAD, CHF-EF 25%, DM, HTN, HLD, Gout, JAMES, Prostate cancer, Stroke with right sided hemiparesis in 2020, Carotid dissection and SAH 1/2023 who presented to ED with near syncope. The patient was just discharged from hospital yesterday for CHF exacerbation. He was discharged on Lasix 40mg daily and Entresto (new Rx). The pt's wife states he took his night time Coreg at approx 2130 and went to bed. Ge got up to use the bathroom. After he had a BM, he was diaphoretic and unresponsive. When EMS arrived, pt was diaphoretic and lethargic with a GCS of 9. Pt also experienced 1 episode of vomiting PTA. Pt's symptoms have improved and he is back to his normal mental status.      In the ED, BP 78/56. Oxygenation normal. Labs revealed Hct 57, mild MAHAD with sCr 1.6. BNP has improved from 1238>131. Troponin 0.090. EKG showed NSR with 1st degree Av block with TWI. CXR- clear            Upon my interview.  Is awake and oriented.    He passed out after he stood up in the bathroom.    BNP seen to have corrected from 1200 to 130.  He appears euvolemic   Shows MAHAD on his blood work.    Of note he was borderline hypertensive during his hospital visit, and was switched from lisinopril to lower equivalent dose of Entresto after 36 hours washout.      Hospital Course:   2/12/24-Patient seen and examined today, resting in bed with wife present. Feels ok. No SOB/CP. BP still soft, but in low 100's this AM. Creatinine 1.3, trending down.        Review of Systems   Constitutional: Positive for malaise/fatigue.   HENT: Negative.     Eyes: Negative.     Cardiovascular: Negative.    Respiratory: Negative.     Endocrine: Negative.    Hematologic/Lymphatic: Negative.    Musculoskeletal: Negative.    Gastrointestinal: Negative.    Genitourinary: Negative.    Neurological: Negative.    Psychiatric/Behavioral: Negative.     Allergic/Immunologic: Negative.      Objective:     Vital Signs (Most Recent):  Temp: 98 °F (36.7 °C) (02/12/24 0726)  Pulse: 75 (02/12/24 0826)  Resp: 18 (02/12/24 0726)  BP: 107/60 (02/12/24 0726)  SpO2: (!) 94 % (02/12/24 0726) Vital Signs (24h Range):  Temp:  [97.2 °F (36.2 °C)-98.7 °F (37.1 °C)] 98 °F (36.7 °C)  Pulse:  [70-89] 75  Resp:  [17-24] 18  SpO2:  [92 %-99 %] 94 %  BP: ()/(60-77) 107/60     Weight: 124.1 kg (273 lb 9.5 oz)  Body mass index is 38.16 kg/m².     SpO2: (!) 94 %         Intake/Output Summary (Last 24 hours) at 2/12/2024 1029  Last data filed at 2/12/2024 0845  Gross per 24 hour   Intake 240 ml   Output --   Net 240 ml       Lines/Drains/Airways       Peripheral Intravenous Line  Duration                  Peripheral IV - Double Lumen 02/11/24 0309 16 G Anterior;Distal;Right Forearm 1 day         Peripheral IV - Single Lumen 02/11/24 0233 18 G Anterior;Right Forearm 1 day                       Physical Exam  Vitals and nursing note reviewed.   Constitutional:       General: He is not in acute distress.     Appearance: Normal appearance. He is well-developed. He is not diaphoretic.   HENT:      Head: Normocephalic and atraumatic.   Eyes:      General:         Right eye: No discharge.         Left eye: No discharge.      Pupils: Pupils are equal, round, and reactive to light.   Cardiovascular:      Rate and Rhythm: Normal rate and regular rhythm.      Heart sounds: Normal heart sounds, S1 normal and S2 normal. No murmur heard.  Pulmonary:      Effort: Pulmonary effort is normal. No respiratory distress.      Breath sounds: Normal breath sounds. No wheezing or rales.   Abdominal:      General: There is no distension.    Musculoskeletal:      Right lower leg: No edema.      Left lower leg: No edema.   Skin:     General: Skin is warm and dry.      Findings: No erythema.   Neurological:      General: No focal deficit present.      Mental Status: He is alert and oriented to person, place, and time.   Psychiatric:         Mood and Affect: Mood normal.         Behavior: Behavior normal.         Thought Content: Thought content normal.            Significant Labs: CMP   Recent Labs   Lab 02/11/24  0258 02/12/24 0812    137   K 4.0 3.6   CL 96 98   CO2 24 26   * 128*   BUN 20 25*   CREATININE 1.6* 1.3   CALCIUM 9.4 8.5*   PROT 7.6  --    ALBUMIN 3.3*  --    BILITOT 2.0*  --    ALKPHOS 72  --    AST 28  --    ALT 29  --    ANIONGAP 18* 13   , CBC   Recent Labs   Lab 02/11/24  0235 02/12/24 0812   WBC 4.18 3.78*   HGB 19.3* 17.4   HCT 57.3* 52.2    193   , Troponin   Recent Labs   Lab 02/11/24 0258   TROPONINI 0.090*   , and All pertinent lab results from the last 24 hours have been reviewed.    Significant Imaging: Echocardiogram: Transthoracic echo (TTE) complete (Cupid Only):   Results for orders placed or performed during the hospital encounter of 02/06/24   Echo   Result Value Ref Range    BSA 2.69 m2    Greene's Biplane MOD Ejection Fraction 31 %    LVOT stroke volume 45.85 cm3    LVIDd 6.07 (A) 3.5 - 6.0 cm    LV Systolic Volume 150.23 mL    LV Systolic Volume Index 58.0 mL/m2    LVIDs 5.55 (A) 2.1 - 4.0 cm    LV Diastolic Volume 184.97 mL    LV Diastolic Volume Index 71.42 mL/m2    IVS 1.52 (A) 0.6 - 1.1 cm    LVOT diameter 2.08 cm    LVOT area 3.4 cm2    FS 9 (A) 28 - 44 %    Left Ventricle Relative Wall Thickness 0.50 cm    Posterior Wall 1.52 (A) 0.6 - 1.1 cm    LV mass 443.56 g    LV Mass Index 171 g/m2    MV Peak E Simon 0.77 m/s    TDI LATERAL 0.09 m/s    TDI SEPTAL 0.07 m/s    E/E' ratio 9.63 m/s    MV Peak A Simon 0.31 m/s    TR Max Simon 2.82 m/s    E/A ratio 2.48     IVRT 60.89 msec    E wave  deceleration time 119.06 msec    LV SEPTAL E/E' RATIO 11.00 m/s    LV LATERAL E/E' RATIO 8.56 m/s    LVOT peak simon 0.73 m/s    Left Ventricular Outflow Tract Mean Velocity 0.63 cm/s    Left Ventricular Outflow Tract Mean Gradient 1.63 mmHg    RVDD 2.96 cm    RVOT peak VTI 7.1 cm    TAPSE 1.64 cm    LA size 4.93 cm    Left Atrium Minor Axis 5.85 cm    Left Atrium Major Axis 6.08 cm    RA Major Axis 5.15 cm    AV regurgitation pressure 1/2 time 878.645144970700686 ms    AR Max Simon 1.88 m/s    AV mean gradient 5 mmHg    AV peak gradient 8 mmHg    Ao peak simon 1.37 m/s    Ao VTI 25.10 cm    LVOT peak VTI 13.50 cm    AV valve area 1.83 cm²    AV Velocity Ratio 0.53     AV index (prosthetic) 0.54     GINGER by Velocity Ratio 1.81 cm²    Mr max simon 3.61 m/s    MV stenosis pressure 1/2 time 34.53 ms    MV valve area p 1/2 method 6.37 cm2    TV mean gradient 32 mmHg    Triscuspid Valve Regurgitation Peak Gradient 32 mmHg    PV mean gradient 1 mmHg    RVOT peak simon 0.49 m/s    Ao root annulus 3.49 cm    STJ 3.66 cm    Ascending aorta 3.62 cm    IVC diameter 2.65 cm    Mean e' 0.08 m/s    ZLVIDS -5.06     ZLVIDD -10.82     LA Volume Index 42.4 mL/m2    LA volume 109.94 cm3    LA WIDTH 4.4 cm    RA Width 3.3 cm    TV resting pulmonary artery pressure 47 mmHg    RV TB RVSP 18 mmHg    Est. RA pres 15 mmHg    Narrative      Left Ventricle: The left ventricle is moderately dilated. Normal wall   thickness. Severe global hypokinesis present. There is severely reduced   systolic function with a visually estimated ejection fraction of 25 - 30%.   Grade II diastolic dysfunction.    Right Ventricle: Normal right ventricular cavity size. Wall thickness   is normal. Right ventricle wall motion  is normal. Systolic function is   mildly reduced.    Left Atrium: Left atrium is moderately dilated.    Right Atrium: Right atrium is mildly dilated.    Aortic Valve: There is mild aortic regurgitation.    Mitral Valve: There is mild regurgitation.     Tricuspid Valve: There is mild regurgitation.    Pulmonary Artery: The estimated pulmonary artery systolic pressure is   47 mmHg.    IVC/SVC: Elevated venous pressure at 15 mmHg.     , EKG: Reviewed, and X-Ray: CXR: X-Ray Chest 1 View (CXR): No results found for this visit on 02/11/24. and X-Ray Chest PA and Lateral (CXR): No results found for this visit on 02/11/24.  Assessment and Plan:   Patient who presents with near syncope in setting of MAHAD/hypotension. BP still soft but improving. Recommend Toprol XL 25 mg and Lasix as needed as OP. Can plan to re-initiate Entresto as OP.    * Near syncope  Seen plan below    MAHAD (acute kidney injury)  Holding Entresto and Lasix    Hypotension  Hold BP meds    Chronic combined systolic and diastolic congestive heart failure  No signs of fluid overload.    Possibly hypovolemic may be over diuresed on his last visit.    Hold off on Lasix for now.    Hold Entresto given his MAHAD.    Currently off beta-blockers as blood pressure currently is 90's/60.    When more stable we will change his carvedilol to Toprol 25 mg XL.    And will decrease his Entresto to lower the b.i.d..  Wife is not sure if he had a blue ink discharge from the device  We will get it checked.      2/12/24  -Stable, compensated  -No SOB  -Creatinine improving  -Recommend low dose Toprol XL 25 mg once able to tolerate  -Lasix prn  -Can re-initiate Entresto as OP once BP/creatinine stable          VTE Risk Mitigation (From admission, onward)           Ordered     enoxaparin injection 40 mg  Daily         02/11/24 0458     IP VTE HIGH RISK PATIENT  Once         02/11/24 0458     Place sequential compression device  Until discontinued         02/11/24 0458                    Genet Payne PA-C  Cardiology  [facility] - Telemetry ([facility])

## 2024-02-12 NOTE — CONSULTS
SW meet with patient and spouse at bedside to discuss home health upon discharge. Patient's spouse stated they had used [facility] in the past and wished to resume care with them. BERNARDO stated she would send referral and [facility] discharge worksheet to have approval for Home Health upon discharge.   BERNARDO contacted Zhane, with [facility], letting her know of referral. Per Zhane, she will let their intake know to pull referral. Zhane also stated BERNARDO still needed to fill out [facility] discharge worksheet and fax.    BERNARDO pending Attending placing HH orders to send to [facility]. BERNARDO will fill out [facility] worksheet and send with clinicals and orders to [facility].     14 36 SW sent referral to [facility] via [facility] for HH set up. BERNARDO completing [facility] DC worksheet and will send to [facility] upon completion.     15 03 [facility] DC Worksheet faxed to [facility] for approval. BERNARDO will also email to our [facility] liaison for review and HH set up.     BERNARDO will continue to follow and assist as needed.

## 2024-02-12 NOTE — PT/OT/SLP EVAL
Physical Therapy Evaluation and Treatment    Patient Name: Ron Matthew   MRN: [id number]  Recent Surgery: * No surgery found *      Recommendations:     Discharge Recommendations: Low Intensity Therapy   Discharge Equipment Recommendations: none   Barriers to discharge: None    Assessment:     Ron Matthew is a 68 y.o. male admitted with a medical diagnosis of Near syncope. He presents with the following impairments/functional limitations: weakness, impaired endurance, impaired functional mobility, gait instability, impaired balance, decreased safety awareness, decreased lower extremity function, impaired cardiopulmonary response to activity.    Rehab Prognosis: Good; patient would benefit from acute PT services to address these deficits and reach maximum level of function.    Plan:     During this hospitalization, patient to be seen 3 x/week to address the above listed problems via gait training, therapeutic activities, therapeutic exercises    Plan of Care Expires: 02/26/24    Subjective     Chief Complaint: Pt is motivated to participate, c/o weakness  Patient Comments/Goals: none stated  Pain/Comfort:  Pain Rating 1: 0/10    Social History:  Living Environment: Patient lives with their spouse in a 2 story home with number of outside stair(s): 0 and bed/bath on 1st floor  Prior Level of Function: Prior to admission, patient was independent, driving and not working, and ambulated household and community distances using straight cane and rollator  Equipment Used at Home: rollator, cane, straight, grab bar, shower chair  DME owned (not currently used): none  Assistance Upon Discharge: significant other    Objective:     Communicated with nurse Harris and [facility] chart review prior to session. Patient found supine with peripheral IV, telemetry (life vest) upon PT entry to room.    General Precautions: Standard, fall (hypotensive)   Orthopedic Precautions: N/A   Braces: N/A    Respiratory Status: Room  "air    Exams:  Cognition: Patient is oriented to Person, Place, Time, Situation  RLE ROM: WFL  RLE Strength:  Grossly 4+/5  LLE ROM: WFL  LLE Strength:  Grossly 4+/5  Sensation:    -       Intact  Skin Integrity/Edema:     -       Skin integrity: Visible skin intact    Functional Mobility:  Gait belt applied - Yes  Bed Mobility  Rolling Right: contact guard assistance  Scooting: contact guard assistance  Supine to Sit: contact guard assistance  Transfers  Sit to Stand: contact guard assistance with hand-held assist  Bed to Chair: minimum assistance with hand-held assist using Step Transfer  Gait  Patient ambulated 15ft x2 with hand-held assist and minimum assistance. Patient demonstrates occasional unsteady gait. No c/o dizziness or SOB, no gross LOB. All lines remained intact throughout ambulation trail.  Balance  Sitting: contact guard assistance  Standing: minimum assistance  Blood Pressure  Supine: 87/60  Supine: 98/61, MAP 75  Sittin/63, MAP 69  Sittin/51, MAP 64  Standin/56, MAP 68  Sitting after ambulatin/55, MAP 71    Therapeutic Activities and Exercises:   Pt educated on role of PT in acute care and POC. Educated on importance of OOB activities, activity pacing, and HEP (marching/hip flex, hip abd, heel slides/LAQ, quad sets, ankle pumps) in order to maintain/regain strength. Encouraged to sit up in chair for all meals. Educated on proper use of RW for safety and to reduce risk of falling. Educated on "call don't fall" policy and increased risk of falling due to weakness, instructed to utilize call bell for assistance with all transfers. Pt agreeable to all requests.    AM-PAC 6 CLICK MOBILITY  Total Score:16    Patient left up in chair with all lines intact, call button in reach, RN notified, and chair alarm on.    GOALS:   Multidisciplinary Problems       Physical Therapy Goals          Problem: Physical Therapy    Goal Priority Disciplines Outcome Goal Variances Interventions "   Physical Therapy Goal     PT, PT/OT      Description: Goals to be met by 2/26/24.  1. Pt will complete bed mobility MOD I.  2. Pt will complete sit to stand MOD I.  3. Pt will ambulate 200ft MOD I using RW.  4. Pt will increase AMPAC score by 2 points to progress functional mobility.                       History:     Past Medical History:   Diagnosis Date    CAD (coronary artery disease)     Carotid artery dissection     CHF (congestive heart failure)     Gout, unspecified     HLD (hyperlipidemia)     HTN (hypertension)     Hyperparathyroidism, unspecified     JAMES (obstructive sleep apnea)     Prostate cancer     SAH (subarachnoid hemorrhage) 01/2023    Stroke 2020    Type 2 diabetes mellitus without complications        Past Surgical History:   Procedure Laterality Date    ANGIOGRAM, CAROTID      ANGIOGRAM, VERTEBRAL      CATHETERIZATION OF BOTH LEFT AND RIGHT HEART N/A 2/8/2024    Procedure: CATHETERIZATION, HEART, BOTH LEFT AND RIGHT;  Surgeon: Genie Claros MD;  Location: [facility] CATH LAB;  Service: Cardiology;  Laterality: N/A;    PARATHYROIDECTOMY         Time Tracking:     PT Received On: 02/12/24  PT Start Time: 1056  PT Stop Time: 1121  PT Total Time (min): 25 min     Billable Minutes: Evaluation 15min and Therapeutic Activity 10min    2/12/2024

## 2024-02-12 NOTE — PLAN OF CARE
OT sagar completed. Sup>sit SBA, sit>stand SBA, functional mobility x25ft without AD and CGA, step>pivot to bedside chair with CGA. Recommending low intensity intervention at d/c.

## 2024-02-12 NOTE — ASSESSMENT & PLAN NOTE
Patient's FSGs are controlled on current medication regimen.  Last A1c reviewed-   Lab Results   Component Value Date    HGBA1C 6.5 (H) 02/11/2024     Most recent fingerstick glucose reviewed-   Recent Labs   Lab 02/11/24  1654 02/11/24 2006 02/12/24  0538 02/12/24  1229   POCTGLUCOSE 157* 184* 151* 131*       Current correctional scale  Low  Maintain anti-hyperglycemic dose as follows-   Antihyperglycemics (From admission, onward)    Start     Stop Route Frequency Ordered    02/11/24 0603  insulin aspart U-100 pen 0-5 Units         -- SubQ Before meals & nightly PRN 02/11/24 0504        Hold Oral hypoglycemics while patient is in the hospital.

## 2024-02-12 NOTE — SUBJECTIVE & OBJECTIVE
Interval History: NAEON. Systolic BP remains 90-100s. Seen with wife at bedside. Reports continued fatigue. Denies CP, SOB, dizziness at rest or with ambulation.      Review of Systems  Objective:     Vital Signs (Most Recent):  Temp: 98.6 °F (37 °C) (02/12/24 1230)  Pulse: 72 (02/12/24 1230)  Resp: 18 (02/12/24 1230)  BP: 96/64 (02/12/24 1230)  SpO2: 96 % (02/12/24 1230) Vital Signs (24h Range):  Temp:  [97.2 °F (36.2 °C)-98.7 °F (37.1 °C)] 98.6 °F (37 °C)  Pulse:  [72-89] 72  Resp:  [17-20] 18  SpO2:  [92 %-96 %] 96 %  BP: ()/(60-77) 96/64     Weight: 124.1 kg (273 lb 9.5 oz)  Body mass index is 38.16 kg/m².    Intake/Output Summary (Last 24 hours) at 2/12/2024 1457  Last data filed at 2/12/2024 0845  Gross per 24 hour   Intake 240 ml   Output --   Net 240 ml         Physical Exam  Vitals and nursing note reviewed.   Constitutional:       General: He is not in acute distress.     Appearance: He is obese. He is not ill-appearing.   Cardiovascular:      Rate and Rhythm: Normal rate and regular rhythm.      Heart sounds: No murmur heard.     No friction rub. No gallop.      Comments: Lifevest in place   Pulmonary:      Effort: Pulmonary effort is normal.      Breath sounds: Normal breath sounds. No wheezing, rhonchi or rales.   Abdominal:      General: Bowel sounds are normal. There is no distension.      Palpations: Abdomen is soft.      Tenderness: There is no abdominal tenderness.   Musculoskeletal:      Right lower leg: No edema.      Left lower leg: No edema.   Neurological:      Mental Status: He is alert and oriented to person, place, and time. Mental status is at baseline.             Significant Labs: All pertinent labs within the past 24 hours have been reviewed.    Significant Imaging: I have reviewed all pertinent imaging results/findings within the past 24 hours.

## 2024-02-12 NOTE — DISCHARGE SUMMARY
[facility] - Tele[facility] ([facility])  [facility] Medicine  Discharge Summary      Patient Name: Ron Matthew  MRN: [id number]  [facility]: [id number]  Patient Class: IP- Inpatient  Admission Date: 2/6/2024  Hospital Length of Stay: 4 days  Discharge Date and Time: 2/10/24  Attending Physician: No att. providers found   Discharging Provider: Heidi Hernandez MD  Primary Care Provider: Administration, [facility]    Primary Care Team: Networked reference to record PCT     HPI:   The patient is a 67 yo male with CAD, CHF-EF 45%, DM, HTN, HLD, Gout, JAMES, Prostate cancer, Stroke with right sided hemiparesis in 2020, Carotid dissection and SAH 1/2023 who presented to ED with SOB, BLE edema, and orthopnea x 5 days. Spouse reports he has not slept for several days due SOB. He did complain of chest pain yesterday but it resolved.     In the ED, BP mildly elevated, Afebrile, Oxygenation stable. +tachypnea. WBC 3.8, BNP 1238, Troponin 0.109. EKG-NSR with PACs, prolonged QT, no ischemia. CXR- Cardiomegaly. Pulmonary congestion.      Procedure(s) (LRB):  CATHETERIZATION, HEART, BOTH LEFT AND RIGHT (N/A)      Hospital Course:   patient is a 67 yo male with CAD, CHF, DM, HTN, HLD, Gout, JAMES, Prostate cancer, Stroke with right sided hemiparesis in 2020, Carotid dissection and SAH 1/2023 who presented to ED with SOB, BLE edema, and orthopnea x 5 days.      Admitted under Hospital Medicine for acute on chronic combined systolic and diastolic heart failure.    Echo as of today with ejection fraction 25-30% EF.  Plan to continue IV diuretics, Coreg, lisinopril, cardiology plans for possible right/left heart catheterization on 02/08/2024,     s/p C nonobstructive disease EF noted to be 25% on angiogram with dilated cardiomyopathy as of 2/9/24;     On 2/10/24- examination of patient done at bedside; appeared alert and oriented x3; denied acute issues overnight.    Blood pressure is slightly trended down after receiving morning  medications, responded to IV fluid bolus.  Denied further dizziness, chest pain, shortness OO breath, palpitations, ambulated without issues.  Blood pressure stabilized.  Patient has been on IV Lasix 40 b.i.d., metolazone during hospital stay.  Discussed with Cardiology, stated okay for discharge on Lasix 40 mg once daily, Entresto, LifeVest, outpatient follow up.     worked on arrangements for LifeVest.    Considering clinical and hemodynamic stability, planning to discharge patient today, emphasized noncompliance with medications, outpatient follow up with PCP/Cardiology, patient/wife at bedside agreed.       Goals of Care Treatment Preferences:  Code Status: Full Code      Consults:   Consults (From admission, onward)          Status Ordering Provider     Inpatient consult to Social Work/Case Management  Once        Provider:  (Not yet assigned)    Completed OMAYRA POLANCO     Inpatient consult to Cardiology  Once        Provider:  (Not yet assigned)    Completed SRAVANI SHELTON     Inpatient consult to Registered Dietitian/Nutritionist  Once        Provider:  (Not yet assigned)    Completed SRAVANI SHELTON            No new Assessment & Plan notes have been filed under this hospital service since the last note was generated.  Service: Hospital Medicine    Final Active Diagnoses:    Diagnosis Date Noted POA    PRINCIPAL PROBLEM:  Chronic combined systolic and diastolic congestive heart failure [I50.42] 02/06/2024 Yes    Carotid stenosis, asymptomatic, bilateral [I65.23] 02/06/2024 Yes    Elevated troponin [R79.89] 02/06/2024 Yes    Type 2 diabetes mellitus, without long-term current use of insulin [E11.9] 02/06/2024 Yes    Hypertension [I10] 02/06/2024 Yes    CAD (coronary artery disease) [I25.10] 02/06/2024 Yes    Stroke [I63.9] 02/06/2024 Yes      Problems Resolved During this Admission:       Discharged Condition: good    Disposition: Home or Self Care    Follow Up:   Follow-up Information        Administration, [facility] Follow up in 1 week(s).    Contact information:  [address] [facility] [place] [id number] [phone]               Genie Claros MD Follow up in 1 week(s).    Specialties: Interventional Cardiology, Cardiology  Contact information:  [address]  [place] [address]  [phone]                           Patient Instructions:   No discharge procedures on file.    Significant Diagnostic Studies:     Results for orders placed or performed during the hospital encounter of 02/06/24   HIV 1/2 Ag/Ab (4th Gen)   Result Value Ref Range    HIV 1/2 Ag/Ab Negative Negative   Hepatitis C Antibody   Result Value Ref Range    Hepatitis C Ab Negative Negative   HCV Virus Hold Specimen   Result Value Ref Range    HEP C Virus Hold Specimen Hold for HCV sendout    CBC auto differential   Result Value Ref Range    WBC 3.83 (L) 3.90 - 12.70 K/uL    RBC 5.11 4.60 - 6.20 M/uL    Hemoglobin 15.2 14.0 - 18.0 g/dL    Hematocrit 45.6 40.0 - 54.0 %    MCV 89 82 - 98 fL    MCH 29.7 27.0 - 31.0 pg    MCHC 33.3 32.0 - 36.0 g/dL    RDW 17.5 (H) 11.5 - 14.5 %    Platelets 194 150 - 450 K/uL    MPV 11.2 9.2 - 12.9 fL    Immature Granulocytes 0.3 0.0 - 0.5 %    Gran # (ANC) 2.0 1.8 - 7.7 K/uL    Immature Grans (Abs) 0.01 0.00 - 0.04 K/uL    Lymph # 1.2 1.0 - 4.8 K/uL    Mono # 0.4 0.3 - 1.0 K/uL    Eos # 0.2 0.0 - 0.5 K/uL    Baso # 0.02 0.00 - 0.20 K/uL    nRBC 1 (A) 0 /100 WBC    Gran % 52.7 38.0 - 73.0 %    Lymph % 31.1 18.0 - 48.0 %    Mono % 11.0 4.0 - 15.0 %    Eosinophil % 4.4 0.0 - 8.0 %    Basophil % 0.5 0.0 - 1.9 %    Differential Method Automated    Comprehensive metabolic panel   Result Value Ref Range    Sodium 141 136 - 145 mmol/L    Potassium 4.3 3.5 - 5.1 mmol/L    Chloride 110 95 - 110 mmol/L    CO2 20 (L) 23 - 29 mmol/L    Glucose 133 (H) 70 - 110 mg/dL    BUN 13 8 - 23 mg/dL    Creatinine 0.9 0.5 - 1.4 mg/dL    Calcium 8.8 8.7 - 10.5 mg/dL    Total Protein  6.5 6.0 - 8.4 g/dL    Albumin 3.0 (L) 3.5 - 5.2 g/dL    Total Bilirubin 1.3 (H) 0.1 - 1.0 mg/dL    Alkaline Phosphatase 63 55 - 135 U/L    AST 24 10 - 40 U/L    ALT 34 10 - 44 U/L    eGFR >60 >60 mL/min/1.73 m^2    Anion Gap 11 8 - 16 mmol/L   Troponin I #1   Result Value Ref Range    Troponin I 0.109 (H) 0.000 - 0.026 ng/mL   BNP   Result Value Ref Range    BNP 1,238 (H) 0 - 99 pg/mL   Troponin I   Result Value Ref Range    Troponin I 0.089 (H) 0.000 - 0.026 ng/mL   CBC Auto Differential   Result Value Ref Range    WBC 3.79 (L) 3.90 - 12.70 K/uL    RBC 5.23 4.60 - 6.20 M/uL    Hemoglobin 15.5 14.0 - 18.0 g/dL    Hematocrit 47.4 40.0 - 54.0 %    MCV 91 82 - 98 fL    MCH 29.6 27.0 - 31.0 pg    MCHC 32.7 32.0 - 36.0 g/dL    RDW 18.0 (H) 11.5 - 14.5 %    Platelets 162 150 - 450 K/uL    MPV 10.7 9.2 - 12.9 fL    Immature Granulocytes 0.3 0.0 - 0.5 %    Gran # (ANC) 2.1 1.8 - 7.7 K/uL    Immature Grans (Abs) 0.01 0.00 - 0.04 K/uL    Lymph # 1.0 1.0 - 4.8 K/uL    Mono # 0.5 0.3 - 1.0 K/uL    Eos # 0.2 0.0 - 0.5 K/uL    Baso # 0.02 0.00 - 0.20 K/uL    nRBC 0 0 /100 WBC    Gran % 54.8 38.0 - 73.0 %    Lymph % 25.9 18.0 - 48.0 %    Mono % 14.0 4.0 - 15.0 %    Eosinophil % 4.5 0.0 - 8.0 %    Basophil % 0.5 0.0 - 1.9 %    Differential Method Automated    Comprehensive metabolic panel   Result Value Ref Range    Sodium 143 136 - 145 mmol/L    Potassium 4.1 3.5 - 5.1 mmol/L    Chloride 109 95 - 110 mmol/L    CO2 25 23 - 29 mmol/L    Glucose 115 (H) 70 - 110 mg/dL    BUN 13 8 - 23 mg/dL    Creatinine 0.9 0.5 - 1.4 mg/dL    Calcium 8.9 8.7 - 10.5 mg/dL    Total Protein 6.6 6.0 - 8.4 g/dL    Albumin 3.1 (L) 3.5 - 5.2 g/dL    Total Bilirubin 1.7 (H) 0.1 - 1.0 mg/dL    Alkaline Phosphatase 66 55 - 135 U/L    AST 22 10 - 40 U/L    ALT 35 10 - 44 U/L    eGFR >60 >60 mL/min/1.73 m^2    Anion Gap 9 8 - 16 mmol/L   Magnesium   Result Value Ref Range    Magnesium 2.0 1.6 - 2.6 mg/dL   Phosphorus   Result Value Ref Range    Phosphorus 4.3  2.7 - 4.5 mg/dL   Troponin I   Result Value Ref Range    Troponin I 0.073 (H) 0.000 - 0.026 ng/mL   Troponin I   Result Value Ref Range    Troponin I 0.075 (H) 0.000 - 0.026 ng/mL   CBC Auto Differential   Result Value Ref Range    WBC 3.08 (L) 3.90 - 12.70 K/uL    RBC 5.60 4.60 - 6.20 M/uL    Hemoglobin 16.5 14.0 - 18.0 g/dL    Hematocrit 49.1 40.0 - 54.0 %    MCV 88 82 - 98 fL    MCH 29.5 27.0 - 31.0 pg    MCHC 33.6 32.0 - 36.0 g/dL    RDW 17.8 (H) 11.5 - 14.5 %    Platelets 180 150 - 450 K/uL    MPV 11.0 9.2 - 12.9 fL    Immature Granulocytes 0.3 0.0 - 0.5 %    Gran # (ANC) 1.6 (L) 1.8 - 7.7 K/uL    Immature Grans (Abs) 0.01 0.00 - 0.04 K/uL    Lymph # 0.8 (L) 1.0 - 4.8 K/uL    Mono # 0.5 0.3 - 1.0 K/uL    Eos # 0.1 0.0 - 0.5 K/uL    Baso # 0.03 0.00 - 0.20 K/uL    nRBC 0 0 /100 WBC    Gran % 51.7 38.0 - 73.0 %    Lymph % 26.9 18.0 - 48.0 %    Mono % 15.6 (H) 4.0 - 15.0 %    Eosinophil % 4.5 0.0 - 8.0 %    Basophil % 1.0 0.0 - 1.9 %    Differential Method Automated    Basic Metabolic Panel   Result Value Ref Range    Sodium 142 136 - 145 mmol/L    Potassium 3.3 (L) 3.5 - 5.1 mmol/L    Chloride 100 95 - 110 mmol/L    CO2 27 23 - 29 mmol/L    Glucose 107 70 - 110 mg/dL    BUN 12 8 - 23 mg/dL    Creatinine 1.0 0.5 - 1.4 mg/dL    Calcium 9.2 8.7 - 10.5 mg/dL    Anion Gap 15 8 - 16 mmol/L    eGFR >60 >60 mL/min/1.73 m^2   CBC Auto Differential   Result Value Ref Range    WBC 3.78 (L) 3.90 - 12.70 K/uL    RBC 5.96 4.60 - 6.20 M/uL    Hemoglobin 17.4 14.0 - 18.0 g/dL    Hematocrit 51.8 40.0 - 54.0 %    MCV 87 82 - 98 fL    MCH 29.2 27.0 - 31.0 pg    MCHC 33.6 32.0 - 36.0 g/dL    RDW 17.5 (H) 11.5 - 14.5 %    Platelets 190 150 - 450 K/uL    MPV 10.9 9.2 - 12.9 fL    Immature Granulocytes 0.3 0.0 - 0.5 %    Gran # (ANC) 2.1 1.8 - 7.7 K/uL    Immature Grans (Abs) 0.01 0.00 - 0.04 K/uL    Lymph # 1.0 1.0 - 4.8 K/uL    Mono # 0.6 0.3 - 1.0 K/uL    Eos # 0.2 0.0 - 0.5 K/uL    Baso # 0.02 0.00 - 0.20 K/uL    nRBC 0 0 /100 WBC     Gran % 54.1 38.0 - 73.0 %    Lymph % 26.5 18.0 - 48.0 %    Mono % 14.6 4.0 - 15.0 %    Eosinophil % 4.0 0.0 - 8.0 %    Basophil % 0.5 0.0 - 1.9 %    Differential Method Automated    Basic Metabolic Panel   Result Value Ref Range    Sodium 134 (L) 136 - 145 mmol/L    Potassium 3.3 (L) 3.5 - 5.1 mmol/L    Chloride 98 95 - 110 mmol/L    CO2 28 23 - 29 mmol/L    Glucose 139 (H) 70 - 110 mg/dL    BUN 15 8 - 23 mg/dL    Creatinine 1.0 0.5 - 1.4 mg/dL    Calcium 9.2 8.7 - 10.5 mg/dL    Anion Gap 8 8 - 16 mmol/L    eGFR >60 >60 mL/min/1.73 m^2   EKG 12-lead (Chest Pain) Age >30   Result Value Ref Range    QRS Duration 98 ms    [facility]HS QTC Calculation 484 ms   Echo   Result Value Ref Range    BSA 2.69 m2    Greene's Biplane MOD Ejection Fraction 31 %    LVOT stroke volume 45.85 cm3    LVIDd 6.07 (A) 3.5 - 6.0 cm    LV Systolic Volume 150.23 mL    LV Systolic Volume Index 58.0 mL/m2    LVIDs 5.55 (A) 2.1 - 4.0 cm    LV Diastolic Volume 184.97 mL    LV Diastolic Volume Index 71.42 mL/m2    IVS 1.52 (A) 0.6 - 1.1 cm    LVOT diameter 2.08 cm    LVOT area 3.4 cm2    FS 9 (A) 28 - 44 %    Left Ventricle Relative Wall Thickness 0.50 cm    Posterior Wall 1.52 (A) 0.6 - 1.1 cm    LV mass 443.56 g    LV Mass Index 171 g/m2    MV Peak E Simon 0.77 m/s    TDI LATERAL 0.09 m/s    TDI SEPTAL 0.07 m/s    E/E' ratio 9.63 m/s    MV Peak A Simon 0.31 m/s    TR Max Simon 2.82 m/s    E/A ratio 2.48     IVRT 60.89 msec    E wave deceleration time 119.06 msec    LV SEPTAL E/E' RATIO 11.00 m/s    LV LATERAL E/E' RATIO 8.56 m/s    LVOT peak simon 0.73 m/s    Left Ventricular Outflow Tract Mean Velocity 0.63 cm/s    Left Ventricular Outflow Tract Mean Gradient 1.63 mmHg    RVDD 2.96 cm    RVOT peak VTI 7.1 cm    TAPSE 1.64 cm    LA size 4.93 cm    Left Atrium Minor Axis 5.85 cm    Left Atrium Major Axis 6.08 cm    RA Major Axis 5.15 cm    AV regurgitation pressure 1/2 time [id number] ms    AR Max Simon 1.88 m/s    AV mean gradient 5 mmHg    AV peak  gradient 8 mmHg    Ao peak tank 1.37 m/s    Ao VTI 25.10 cm    LVOT peak VTI 13.50 cm    AV valve area 1.83 cm²    AV Velocity Ratio 0.53     AV index (prosthetic) 0.54     GINGER by Velocity Ratio 1.81 cm²    Mr max tank 3.61 m/s    MV stenosis pressure 1/2 time 34.53 ms    MV valve area p 1/2 method 6.37 cm2    TV mean gradient 32 mmHg    Triscuspid Valve Regurgitation Peak Gradient 32 mmHg    PV mean gradient 1 mmHg    RVOT peak tank 0.49 m/s    Ao root annulus 3.49 cm    STJ 3.66 cm    Ascending aorta 3.62 cm    IVC diameter 2.65 cm    Mean e' 0.08 m/s    ZLVIDS -5.06     ZLVIDD -10.82     LA Volume Index 42.4 mL/m2    LA volume 109.94 cm3    LA WIDTH 4.4 cm    RA Width 3.3 cm    TV resting pulmonary artery pressure 47 mmHg    RV TB RVSP 18 mmHg    Est. RA pres 15 mmHg   Cardiac catheterization   Result Value Ref Range    Cath EF Quantitative 25 %   POCT glucose   Result Value Ref Range    POCT Glucose 113 (H) 70 - 110 mg/dL   POCT glucose   Result Value Ref Range    POCT Glucose 95 70 - 110 mg/dL   ISTAT PROCEDURE   Result Value Ref Range    POC PH 7.372 7.35 - 7.45    POC PCO2 56.6 (H) 35 - 45 mmHg    POC PO2 34 (LL) 40 - 60 mmHg    POC HCO3 32.9 (H) 24 - 28 mmol/L    POC BE 8 (H) -2 to 2 mmol/L    POC SATURATED O2 61 95 - 100 %    Sample VENOUS     Site Other     Allens Test N/A     DelSys Room Air     Mode SPONT     FiO2 21    POCT glucose   Result Value Ref Range    POCT Glucose 153 (H) 70 - 110 mg/dL   POCT glucose   Result Value Ref Range    POCT Glucose 123 (H) 70 - 110 mg/dL   POCT glucose   Result Value Ref Range    POCT Glucose 121 (H) 70 - 110 mg/dL   POCT glucose   Result Value Ref Range    POCT Glucose 168 (H) 70 - 110 mg/dL   POCT glucose   Result Value Ref Range    POCT Glucose 158 (H) 70 - 110 mg/dL   POCT glucose   Result Value Ref Range    POCT Glucose 143 (H) 70 - 110 mg/dL   POCT glucose   Result Value Ref Range    POCT Glucose 201 (H) 70 - 110 mg/dL   POCT glucose   Result Value Ref Range    POCT  Glucose 148 (H) 70 - 110 mg/dL        Imaging Results              X-Ray Chest AP Portable (Final result)  Result time 02/06/24 08:02:42      Final result by Shahid Louise MD (02/06/24 08:02:42)                   Impression:      As above.      Electronically signed by: Shahid Louise  Date:    02/06/2024  Time:    08:02               Narrative:    EXAMINATION:  XR CHEST AP PORTABLE    CLINICAL HISTORY:  Chest Pain;.    TECHNIQUE:  Single frontal portable view of the chest was performed.    COMPARISON:  None    FINDINGS:  Enlarged cardiomediastinal silhouette presumed related to cardiomegaly.  Pericardial effusion not excluded.  Pulmonary vascular congestion with mild interstitial edema.  Ill-defined opacification left lung base obscured by the heart border.  Left pleural effusion and left retrocardiac airspace disease not excluded.  No pneumothorax.                                       Pending Diagnostic Studies:       None           Medications:       Medication List        START taking these medications      furosemide 40 MG tablet  Commonly known as: LASIX  Take 1 tablet (40 mg total) by mouth once daily.     sacubitriL-valsartan 49-51 mg per tablet  Commonly known as: ENTRESTO  Take 1 tablet by mouth 2 (two) times daily.            CHANGE how you take these medications      amLODIPine 2.5 MG tablet  Commonly known as: NORVASC  Take 1 tablet (2.5 mg total) by mouth once daily.  What changed: when to take this     carvediloL 12.5 MG tablet  Commonly known as: COREG  Take 1 tablet (12.5 mg total) by mouth 2 (two) times daily.  What changed:   medication strength  how much to take     clopidogreL 75 mg tablet  Commonly known as: PLAVIX  Take 1 tablet (75 mg total) by mouth once daily.  What changed: when to take this            CONTINUE taking these medications      allopurinoL 100 MG tablet  Commonly known as: ZYLOPRIM     aspirin 81 mg Cap  Take 81 mg by mouth once daily.     cholecalciferol (vitamin D3) 50 mcg  (2,000 unit) Cap capsule  Commonly known as: VITAMIN D3     DOCOSAHEXAENOIC ACID ORAL     empagliflozin 25 mg tablet  Commonly known as: Jardiance     levothyroxine 25 MCG tablet  Commonly known as: SYNTHROID     magnesium oxide 250 mg magnesium Tab  Commonly known as: MAG-OX     metFORMIN 1000 MG tablet  Commonly known as: GLUCOPHAGE            STOP taking these medications      atorvastatin 80 MG tablet  Commonly known as: LIPITOR     lisinopriL 40 MG tablet  Commonly known as: PRINIVIL,ZESTRIL               Where to Get Your Medications        You can get these medications from any pharmacy    Bring a paper prescription for each of these medications  amLODIPine 2.5 MG tablet  aspirin 81 mg Cap  carvediloL 12.5 MG tablet  clopidogreL 75 mg tablet  furosemide 40 MG tablet  sacubitriL-valsartan 49-51 mg per tablet          Indwelling Lines/Drains at time of discharge:   Lines/Drains/Airways       None                   Time spent on the discharge of patient: 88 minutes         Heidi Hernandez MD  Department of Hospital Medicine  [facility] - Telemetry ([facility]

## 2024-02-12 NOTE — PLAN OF CARE
A234/A234 ROGE Matthew is a 68 y.o.male admitted on 2/11/2024 for Near syncope   Code Status: Full Code MRN: [id number]   Review of patient's allergies indicates:   Allergen Reactions    Statins-hmg-coa reductase inhibitors Other (See Comments)     Past Medical History:   Diagnosis Date    CAD (coronary artery disease)     Carotid artery dissection     CHF (congestive heart failure)     Gout, unspecified     HLD (hyperlipidemia)     HTN (hypertension)     Hyperparathyroidism, unspecified     JAMES (obstructive sleep apnea)     Prostate cancer     SAH (subarachnoid hemorrhage) 01/2023    Stroke 2020    Type 2 diabetes mellitus without complications       PRN meds    dextrose 10%, 12.5 g, PRN  dextrose 10%, 25 g, PRN  glucagon (human recombinant), 1 mg, PRN  glucose, 16 g, PRN  glucose, 24 g, PRN  insulin aspart U-100, 0-5 Units, QID (AC + HS) PRN  ondansetron, 4 mg, Q8H PRN  sodium chloride 0.9%, 10 mL, PRN      Chart check completed. Will continue plan of care.      Orientation: oriented x 4  Alice Coma Scale Score: 15     Lead Monitored: Lead II Rhythm: normal sinus rhythm Frequency/Ectopy: PVCs  Cardiac/Telemetry Box Number: [id number]  VTE Required Core Measure: Pharmacological prophylaxis initiated/maintained Last Bowel Movement: 02/11/24  Diet diabetic Cardiac (Low Na/Chol); 2000 Calorie; Fluid - 1500mL     Lenny Score: 20  Fall Risk Score: 18  Accucheck [x]   Freq? ACHS     Lines/Drains/Airways       Peripheral Intravenous Line  Duration                  Peripheral IV - Double Lumen 02/11/24 0309 16 G Anterior;Distal;Right Forearm 1 day         Peripheral IV - Single Lumen 02/11/24 0233 18 G Anterior;Right Forearm 1 day                       Problem: Adult Inpatient Plan of Care  Goal: Plan of Care Review  Outcome: Ongoing, Progressing  Goal: Patient-Specific Goal (Individualized)  Outcome: Ongoing, Progressing  Goal: Absence of Hospital-Acquired Illness or Injury  Outcome: Ongoing, Progressing  Goal:  Optimal Comfort and Wellbeing  Outcome: Ongoing, Progressing  Goal: Readiness for Transition of Care  Outcome: Ongoing, Progressing     Problem: Impaired Wound Healing  Goal: Optimal Wound Healing  Outcome: Ongoing, Progressing     Problem: Fluid and Electrolyte Imbalance (Acute Kidney Injury/Impairment)  Goal: Fluid and Electrolyte Balance  Outcome: Ongoing, Progressing     Problem: Oral Intake Inadequate (Acute Kidney Injury/Impairment)  Goal: Optimal Nutrition Intake  Outcome: Ongoing, Progressing     Problem: Renal Function Impairment (Acute Kidney Injury/Impairment)  Goal: Effective Renal Function  Outcome: Ongoing, Progressing     Problem: Diabetes Comorbidity  Goal: Blood Glucose Level Within Targeted Range  Outcome: Ongoing, Progressing

## 2024-02-12 NOTE — PLAN OF CARE
Updated patient on plan of care. Instructed patient to use call light for assistance, call light in reach. Hourly rounding performed. Vitals q4 hours. Education provided, questions answered/encouraged. Chart check complete. NSR    Problem: Adult Inpatient Plan of Care  Goal: Plan of Care Review  Outcome: Ongoing, Progressing

## 2024-02-12 NOTE — PROGRESS NOTES
[facility] ([facility]  Progress Note    Patient Name: Ron Matthew  MRN: [id number]  Patient Class: OP- Observation   Admission Date: 2/11/2024  Length of Stay: 0 days  Attending Physician: Loretta Valadez MD  Primary Care Provider: Administration, [facility]        Subjective:     Principal Problem:Near syncope        HPI:   The patient is a 67 yo male with CAD, CHF-EF 25%, DM, HTN, HLD, Gout, JAMES, Prostate cancer, Stroke with right sided hemiparesis in 2020, Carotid dissection and SAH 1/2023 who presented to ED with near syncope. The patient was just discharged from hospital yesterday for CHF exacerbation. He was discharged on Lasix 40mg daily and Entresto (new Rx). The pt's wife states he took his night time Coreg at approx 2130 and went to bed. Ge got up to use the bathroom. After he had a BM, he was diaphoretic and unresponsive. When EMS arrived, pt was diaphoretic and lethargic with a GCS of 9. Pt also experienced 1 episode of vomiting PTA. Pt's symptoms have improved and he is back to his normal mental status.     In the ED, BP 78/56. Lab revealed Hct 57, mild MAHAD with sCr 1.6. BNP has improved from 1238>131. Troponin 0.090. EKG showed NSR with 1st degree Av block with TWI. CXR- clear     Overview/Hospital Course:  Cardiology consulted to assist with management. PT/OT rec low intensity therapy on discharge. Orthostatics +     Interval History: NAEON. Systolic BP remains 90-100s. Seen with wife at bedside. Reports continued fatigue. Denies CP, SOB, dizziness at rest or with ambulation.      Review of Systems  Objective:     Vital Signs (Most Recent):  Temp: 98.6 °F (37 °C) (02/12/24 1230)  Pulse: 72 (02/12/24 1230)  Resp: 18 (02/12/24 1230)  BP: 96/64 (02/12/24 1230)  SpO2: 96 % (02/12/24 1230) Vital Signs (24h Range):  Temp:  [97.2 °F (36.2 °C)-98.7 °F (37.1 °C)] 98.6 °F (37 °C)  Pulse:  [72-89] 72  Resp:  [17-20] 18  SpO2:  [92 %-96 %] 96 %  BP: ()/(60-77) 96/64      Weight: 124.1 kg (273 lb 9.5 oz)  Body mass index is 38.16 kg/m².    Intake/Output Summary (Last 24 hours) at 2/12/2024 3525  Last data filed at 2/12/2024 0845  Gross per 24 hour   Intake 240 ml   Output --   Net 240 ml         Physical Exam  Vitals and nursing note reviewed.   Constitutional:       General: He is not in acute distress.     Appearance: He is obese. He is not ill-appearing.   Cardiovascular:      Rate and Rhythm: Normal rate and regular rhythm.      Heart sounds: No murmur heard.     No friction rub. No gallop.      Comments: Lifevest in place   Pulmonary:      Effort: Pulmonary effort is normal.      Breath sounds: Normal breath sounds. No wheezing, rhonchi or rales.   Abdominal:      General: Bowel sounds are normal. There is no distension.      Palpations: Abdomen is soft.      Tenderness: There is no abdominal tenderness.   Musculoskeletal:      Right lower leg: No edema.      Left lower leg: No edema.   Neurological:      Mental Status: He is alert and oriented to person, place, and time. Mental status is at baseline.             Significant Labs: All pertinent labs within the past 24 hours have been reviewed.    Significant Imaging: I have reviewed all pertinent imaging results/findings within the past 24 hours.    Assessment/Plan:      * Near syncope  Likely 2/2 over-diuresis/antihypertensives  Hold anti hypertensive medications (Coreg, Lasix, Entresto, and Amlodipine) for now   S/p gentle IV hydration on admission   Cardiology consulted   Will resume B blocker per cards recs as BP tolerates   Cont PT/OT     MAHAD (acute kidney injury)  Patient with acute kidney injury/acute renal failure likely due to pre-renal azotemia due to IVVD MAHAD is currently improving. Baseline creatinine  1.0  - Labs reviewed- Renal function/electrolytes with Estimated Creatinine Clearance: 72.9 mL/min (based on SCr of 1.3 mg/dL). according to latest data. Monitor urine output and serial BMP and adjust therapy as  needed. Avoid nephrotoxins and renally dose meds for GFR listed above.  Cr improved with gentle hydration and withholding diuretics   Hold home diuretic for now   Monitor BMP      Hypotension  Se plan above       Chronic combined systolic and diastolic congestive heart failure  Patient is identified as having Combined Systolic and Diastolic heart failure that is Chronic. CHF is currently controlled. Latest ECHO performed and demonstrates- Results for orders placed during the hospital encounter of 02/06/24    Echo    Interpretation Summary    Left Ventricle: The left ventricle is moderately dilated. Normal wall thickness. Severe global hypokinesis present. There is severely reduced systolic function with a visually estimated ejection fraction of 25 - 30%. Grade II diastolic dysfunction.    Right Ventricle: Normal right ventricular cavity size. Wall thickness is normal. Right ventricle wall motion  is normal. Systolic function is mildly reduced.    Left Atrium: Left atrium is moderately dilated.    Right Atrium: Right atrium is mildly dilated.    Aortic Valve: There is mild aortic regurgitation.    Mitral Valve: There is mild regurgitation.    Tricuspid Valve: There is mild regurgitation.    Pulmonary Artery: The estimated pulmonary artery systolic pressure is 47 mmHg.    IVC/SVC: Elevated venous pressure at 15 mmHg.  .Monitor on telemetry. Patient is on CHF pathway.  Monitor strict Is&Os and daily weights.  Place on fluid restriction of 1.5 L. Cardiology has been consulted. Continue to stress to patient importance of self efficacy and  on diet for CHF. Last BNP reviewed- and noted below   Recent Labs   Lab 02/11/24  0235   *         Type 2 diabetes mellitus, without long-term current use of insulin  Patient's FSGs are controlled on current medication regimen.  Last A1c reviewed-   Lab Results   Component Value Date    HGBA1C 6.5 (H) 02/11/2024     Most recent fingerstick glucose reviewed-   Recent Labs    Lab 02/11/24  1654 02/11/24  2006 02/12/24  0538 02/12/24  1229   POCTGLUCOSE 157* 184* 151* 131*       Current correctional scale  Low  Maintain anti-hyperglycemic dose as follows-   Antihyperglycemics (From admission, onward)      Start     Stop Route Frequency Ordered    02/11/24 0603  insulin aspart U-100 pen 0-5 Units         -- SubQ Before meals & nightly PRN 02/11/24 0504          Hold Oral hypoglycemics while patient is in the hospital.         VTE Risk Mitigation (From admission, onward)           Ordered     enoxaparin injection 40 mg  Daily         02/11/24 0458     IP VTE HIGH RISK PATIENT  Once         02/11/24 0458     Place sequential compression device  Until discontinued         02/11/24 0458                    Discharge Planning   ANNE-MARIE:      Code Status: Full Code   Is the patient medically ready for discharge?:     Reason for patient still in hospital (select all that apply): Patient trending condition and Treatment  Discharge Plan A: Home with family                  Loretta Valadez MD  Department of Hospital Medicine   [facility] - Telemetry ([facility])

## 2024-02-13 VITALS
TEMPERATURE: 99 F | WEIGHT: 273.56 LBS | DIASTOLIC BLOOD PRESSURE: 75 MMHG | HEIGHT: 71 IN | OXYGEN SATURATION: 99 % | HEART RATE: 82 BPM | RESPIRATION RATE: 16 BRPM | BODY MASS INDEX: 38.3 KG/M2 | SYSTOLIC BLOOD PRESSURE: 115 MMHG

## 2024-02-13 LAB
ANION GAP SERPL CALC-SCNC: 9 MMOL/L (ref 8–16)
BUN SERPL-MCNC: 28 MG/DL (ref 8–23)
CALCIUM SERPL-MCNC: 8.7 MG/DL (ref 8.7–10.5)
CHLORIDE SERPL-SCNC: 98 MMOL/L (ref 95–110)
CO2 SERPL-SCNC: 30 MMOL/L (ref 23–29)
CREAT SERPL-MCNC: 1.3 MG/DL (ref 0.5–1.4)
EST. GFR  (NO RACE VARIABLE): 60 ML/MIN/1.73 M^2
GLUCOSE SERPL-MCNC: 128 MG/DL (ref 70–110)
MAGNESIUM SERPL-MCNC: 2.2 MG/DL (ref 1.6–2.6)
POCT GLUCOSE: 124 MG/DL (ref 70–110)
POCT GLUCOSE: 127 MG/DL (ref 70–110)
POTASSIUM SERPL-SCNC: 3.8 MMOL/L (ref 3.5–5.1)
SODIUM SERPL-SCNC: 137 MMOL/L (ref 136–145)

## 2024-02-13 PROCEDURE — 83735 ASSAY OF MAGNESIUM: CPT | Performed by: INTERNAL MEDICINE

## 2024-02-13 PROCEDURE — 97116 GAIT TRAINING THERAPY: CPT

## 2024-02-13 PROCEDURE — 25000003 PHARM REV CODE 250: Performed by: NURSE PRACTITIONER

## 2024-02-13 PROCEDURE — 80048 BASIC METABOLIC PNL TOTAL CA: CPT | Performed by: NURSE PRACTITIONER

## 2024-02-13 PROCEDURE — 99232 SBSQ HOSP IP/OBS MODERATE 35: CPT | Mod: ,,, | Performed by: PHYSICIAN ASSISTANT

## 2024-02-13 PROCEDURE — 25000003 PHARM REV CODE 250: Performed by: INTERNAL MEDICINE

## 2024-02-13 PROCEDURE — 36415 COLL VENOUS BLD VENIPUNCTURE: CPT | Performed by: NURSE PRACTITIONER

## 2024-02-13 RX ORDER — METOPROLOL SUCCINATE 25 MG/1
12.5 TABLET, EXTENDED RELEASE ORAL DAILY
Qty: 15 TABLET | Refills: 0 | Status: SHIPPED | OUTPATIENT
Start: 2024-02-14 | End: 2024-02-20

## 2024-02-13 RX ORDER — FUROSEMIDE 40 MG/1
20 TABLET ORAL DAILY PRN
Qty: 15 TABLET | Refills: 0 | Status: SHIPPED | OUTPATIENT
Start: 2024-02-13 | End: 2024-03-14

## 2024-02-13 RX ADMIN — DOCUSATE SODIUM AND SENNOSIDES 1 TABLET: 8.6; 5 TABLET, FILM COATED ORAL at 08:02

## 2024-02-13 RX ADMIN — LEVOTHYROXINE SODIUM 25 MCG: 0.03 TABLET ORAL at 05:02

## 2024-02-13 RX ADMIN — METOPROLOL SUCCINATE 12.5 MG: 25 TABLET, EXTENDED RELEASE ORAL at 08:02

## 2024-02-13 RX ADMIN — ASPIRIN 81 MG: 81 TABLET, COATED ORAL at 08:02

## 2024-02-13 RX ADMIN — CLOPIDOGREL BISULFATE 75 MG: 75 TABLET ORAL at 08:02

## 2024-02-13 NOTE — DISCHARGE SUMMARY
[facility] - Telemetry ([facility])  [facility] Medicine  Discharge Summary      Patient Name: Ron Matthew  MRN: [id number]  [facility]: [id number]  Patient Class: IP- Inpatient  Admission Date: 2/11/2024  Hospital Length of Stay: 1 days  Discharge Date and Time:  02/13/2024 11:09 AM  Attending Physician: Loretta Valadez MD   Discharging Provider: Loretta Valadez MD  Primary Care Provider: Administration, [facility]    Primary Care Team: Networked reference to record PCT     HPI:    The patient is a 69 yo male with CAD, CHF-EF 25%, DM, HTN, HLD, Gout, JAMES, Prostate cancer, Stroke with right sided hemiparesis in 2020, Carotid dissection and SAH 1/2023 who presented to ED with near syncope. The patient was just discharged from hospital yesterday for CHF exacerbation. He was discharged on Lasix 40mg daily and Entresto (new Rx). The pt's wife states he took his night time Coreg at approx 2130 and went to bed. Ge got up to use the bathroom. After he had a BM, he was diaphoretic and unresponsive. When EMS arrived, pt was diaphoretic and lethargic with a GCS of 9. Pt also experienced 1 episode of vomiting PTA. Pt's symptoms have improved and he is back to his normal mental status.     In the ED, BP 78/56. Lab revealed Hct 57, mild MAHAD with sCr 1.6. BNP has improved from 1238>131. Troponin 0.090. EKG showed NSR with 1st degree Av block with TWI. CXR- clear     * No surgery found *      Hospital Course:   Cardiology consulted to assist with management. PT/OT rec low intensity therapy on discharge. Orthostatics +. Diuretics, entresto discontinued with improvement in BP. Systolics remained in low 100s, pt had improvement in fatigue/dizziness. Low dose toprol added after discussion with cardiology. Pt seen and examined on day of discharge and appears stable for discharge home today with . Discussed with cardiology, will continue toprol 12.5, lasix as needed and close outpatient followup with cardiology for further medication  management/adjustment. Discharge instruction and return precautions discussed at length with wife and patient, all questions answered to their satisfaction. Followup with PCP and cardiology within 1 week.          Goals of Care Treatment Preferences:  Code Status: Full Code      Consults:   Consults (From admission, onward)          Status Ordering Provider     Inpatient consult to Social Work  Once        Provider:  (Not yet assigned)    ANIA Mandujano     Inpatient consult to Cardiology  Once        Provider:  Genie Claros MD    Completed SRAVANI SHELTON            No new Assessment & Plan notes have been filed under this hospital service since the last note was generated.  Service: Hospital Medicine    Final Active Diagnoses:    Diagnosis Date Noted POA    PRINCIPAL PROBLEM:  Near syncope [R55] 02/11/2024 Yes    MAHAD (acute kidney injury) [N17.9] 02/11/2024 Yes    Hypotension [I95.9] 02/11/2024 Yes    Chronic combined systolic and diastolic congestive heart failure [I50.42] 02/06/2024 Yes    Type 2 diabetes mellitus, without long-term current use of insulin [E11.9] 02/06/2024 Yes      Problems Resolved During this Admission:       Discharged Condition: good    Disposition: [place]    Follow Up:   Follow-up Information       [facility]. Schedule an appointment as soon as possible for a visit in 3 day(s).    Contact information:  [address] [facility] [id number] [phone]               Destiney Mccain PA. Schedule an appointment as soon as possible for a visit.    Specialty: Transplant  Contact information:  [address]  [facility] [address]  [phone]               Genie Claros MD. Schedule an appointment as soon as possible for a visit.    Specialties: Interventional Cardiology, Cardiology  Contact information:  [address]  [place] [address]  [phone]                           Patient  Instructions:      Ambulatory referral/consult to Home Health   Standing Status: Future   Referral Priority: Routine Referral Type: Home Health   Referral Reason: Specialty Services Required   Requested Specialty: Home Health Services   Number of Visits Requested: 1     Ambulatory referral/consult to [facility]   Standing Status: Future   Referral Priority: Routine Referral Type: Consultation   Referral Reason: Specialty Services Required   Requested Specialty: Cardiology   Number of Visits Requested: 1     Ambulatory referral/consult to [facility]   Standing Status: Future   Referral Priority: Routine Referral Type: Consultation   Referral Reason: Specialty Services Required   Number of Visits Requested: 1     Diet Cardiac     Diet diabetic     Notify your health care provider if you experience any of the following:  difficulty breathing or increased cough     Activity as tolerated       Significant Diagnostic Studies: See Hospital Course     Pending Diagnostic Studies:       None           Medications:  Reconciled Home Medications:      Medication List        START taking these medications      metoprolol succinate 25 MG 24 hr tablet  Commonly known as: TOPROL-XL  Take 0.5 tablets (12.5 mg total) by mouth once daily.  Start taking on: February 14, 2024            CHANGE how you take these medications      furosemide 40 MG tablet  Commonly known as: LASIX  Take 0.5 tablets (20 mg total) by mouth daily as needed (shortness of breath, leg swelling).  What changed:   how much to take  when to take this  reasons to take this            CONTINUE taking these medications      allopurinoL 100 MG tablet  Commonly known as: ZYLOPRIM  Take 100 mg by mouth daily as needed.     aspirin 81 mg Cap  Take 81 mg by mouth once daily.     cholecalciferol (vitamin D3) 50 mcg (2,000 unit) Cap capsule  Commonly known as: VITAMIN D3  Take 1 capsule by mouth once daily.     clopidogreL 75 mg  tablet  Commonly known as: PLAVIX  Take 1 tablet (75 mg total) by mouth once daily.     DOCOSAHEXAENOIC ACID ORAL  Take 1 capsule by mouth once daily.     empagliflozin 25 mg tablet  Commonly known as: Jardiance  Take 1 tablet by mouth once daily.     levothyroxine 25 MCG tablet  Commonly known as: SYNTHROID  Take 1 tablet by mouth every morning.     magnesium oxide 250 mg magnesium Tab  Commonly known as: MAG-OX  Take 1 tablet by mouth every morning.     metFORMIN 1000 MG tablet  Commonly known as: GLUCOPHAGE  Take 1,000 mg by mouth 2 (two) times daily with meals.            STOP taking these medications      amLODIPine 2.5 MG tablet  Commonly known as: NORVASC     carvediloL 12.5 MG tablet  Commonly known as: COREG     sacubitriL-valsartan 49-51 mg per tablet  Commonly known as: ENTRESTO              Indwelling Lines/Drains at time of discharge:   Lines/Drains/Airways       None                   Time spent on the discharge of patient: 45 minutes         Loretta Valadez MD  Department of Hospital Medicine  [facility] - Telemetry ([facility])

## 2024-02-13 NOTE — PLAN OF CARE
TX COMPLETED: facilitated bed mobility with SBA, transfers with SBA, ambulated 200 ft x 2 SBA/CGA with RW. Cont POC

## 2024-02-13 NOTE — ASSESSMENT & PLAN NOTE
No signs of fluid overload.    Possibly hypovolemic may be over diuresed on his last visit.    Hold off on Lasix for now.    Hold Entresto given his MAHAD.    Currently off beta-blockers as blood pressure currently is 90's/60.    When more stable we will change his carvedilol to Toprol 25 mg XL.    And will decrease his Entresto to lower the b.i.d..  Wife is not sure if he had a blue ink discharge from the device  We will get it checked.      2/12/24  -Stable, compensated  -No SOB  -Creatinine improving  -Recommend low dose Toprol XL 25 mg once able to tolerate  -Lasix prn  -Can re-initiate Entresto as OP once BP/creatinine stable    2/13/24  -Stable, compensated  -No SOB/CHF symptoms  -Low dose BB resumed  -Advised Lasix prn as OP  -Close f/u in CHF clinic

## 2024-02-13 NOTE — SUBJECTIVE & OBJECTIVE
Review of Systems   Constitutional: Positive for malaise/fatigue.   HENT: Negative.     Eyes: Negative.    Cardiovascular: Negative.    Respiratory: Negative.     Endocrine: Negative.    Hematologic/Lymphatic: Negative.    Skin: Negative.    Musculoskeletal: Negative.    Gastrointestinal: Negative.    Genitourinary: Negative.    Neurological:  Positive for dizziness (mild).   Psychiatric/Behavioral: Negative.     Allergic/Immunologic: Negative.      Objective:     Vital Signs (Most Recent):  Temp: 97.7 °F (36.5 °C) (02/13/24 0855)  Pulse: 81 (02/13/24 0855)  Resp: 16 (02/13/24 0855)  BP: 108/64 (02/13/24 0855)  SpO2: 96 % (02/13/24 0855) Vital Signs (24h Range):  Temp:  [97.7 °F (36.5 °C)-98.6 °F (37 °C)] 97.7 °F (36.5 °C)  Pulse:  [72-89] 81  Resp:  [16-22] 16  SpO2:  [93 %-96 %] 96 %  BP: ()/(56-80) 108/64     Weight: 124.1 kg (273 lb 9.5 oz)  Body mass index is 38.16 kg/m².     SpO2: 96 %         Intake/Output Summary (Last 24 hours) at 2/13/2024 1038  Last data filed at 2/13/2024 1036  Gross per 24 hour   Intake 1240 ml   Output 300 ml   Net 940 ml       Lines/Drains/Airways       Peripheral Intravenous Line  Duration                  Peripheral IV - Double Lumen 02/11/24 0309 16 G Anterior;Distal;Right Forearm 2 days         Peripheral IV - Single Lumen 02/11/24 0233 18 G Anterior;Right Forearm 2 days                       Physical Exam  Vitals and nursing note reviewed.   Constitutional:       General: He is not in acute distress.     Appearance: Normal appearance. He is well-developed. He is not diaphoretic.   HENT:      Head: Normocephalic and atraumatic.   Eyes:      General:         Right eye: No discharge.         Left eye: No discharge.      Pupils: Pupils are equal, round, and reactive to light.   Neck:      Thyroid: No thyromegaly.      Vascular: No JVD.      Trachea: No tracheal deviation.   Cardiovascular:      Rate and Rhythm: Normal rate and regular rhythm.      Heart sounds: S1 normal  "and S2 normal. No murmur heard.  Pulmonary:      Effort: Pulmonary effort is normal. No respiratory distress.      Breath sounds: Normal breath sounds. No wheezing or rales.   Abdominal:      General: There is no distension.   Musculoskeletal:      Cervical back: Neck supple.      Right lower leg: No edema.      Left lower leg: No edema.   Skin:     General: Skin is warm and dry.      Findings: No erythema.   Neurological:      General: No focal deficit present.      Mental Status: He is alert and oriented to person, place, and time.   Psychiatric:         Mood and Affect: Mood normal.         Behavior: Behavior normal.            Significant Labs: CMP   Recent Labs   Lab 02/12/24  0812 02/13/24  0400    137   K 3.6 3.8   CL 98 98   CO2 26 30*   * 128*   BUN 25* 28*   CREATININE 1.3 1.3   CALCIUM 8.5* 8.7   ANIONGAP 13 9   , CBC   Recent Labs   Lab 02/12/24  0812   WBC 3.78*   HGB 17.4   HCT 52.2      , Troponin No results for input(s): "TROPONINI" in the last 48 hours., and All pertinent lab results from the last 24 hours have been reviewed.    Significant Imaging: Echocardiogram: Transthoracic echo (TTE) complete (Cupid Only):   Results for orders placed or performed during the hospital encounter of 02/06/24   Echo   Result Value Ref Range    BSA 2.69 m2    Greene's Biplane MOD Ejection Fraction 31 %    LVOT stroke volume 45.85 cm3    LVIDd 6.07 (A) 3.5 - 6.0 cm    LV Systolic Volume 150.23 mL    LV Systolic Volume Index 58.0 mL/m2    LVIDs 5.55 (A) 2.1 - 4.0 cm    LV Diastolic Volume 184.97 mL    LV Diastolic Volume Index 71.42 mL/m2    IVS 1.52 (A) 0.6 - 1.1 cm    LVOT diameter 2.08 cm    LVOT area 3.4 cm2    FS 9 (A) 28 - 44 %    Left Ventricle Relative Wall Thickness 0.50 cm    Posterior Wall 1.52 (A) 0.6 - 1.1 cm    LV mass 443.56 g    LV Mass Index 171 g/m2    MV Peak E Simon 0.77 m/s    TDI LATERAL 0.09 m/s    TDI SEPTAL 0.07 m/s    E/E' ratio 9.63 m/s    MV Peak A Simon 0.31 m/s    TR Max " Simon 2.82 m/s    E/A ratio 2.48     IVRT 60.89 msec    E wave deceleration time 119.06 msec    LV SEPTAL E/E' RATIO 11.00 m/s    LV LATERAL E/E' RATIO 8.56 m/s    LVOT peak simon 0.73 m/s    Left Ventricular Outflow Tract Mean Velocity 0.63 cm/s    Left Ventricular Outflow Tract Mean Gradient 1.63 mmHg    RVDD 2.96 cm    RVOT peak VTI 7.1 cm    TAPSE 1.64 cm    LA size 4.93 cm    Left Atrium Minor Axis 5.85 cm    Left Atrium Major Axis 6.08 cm    RA Major Axis 5.15 cm    AV regurgitation pressure 1/2 time [id number] ms    AR Max Simon 1.88 m/s    AV mean gradient 5 mmHg    AV peak gradient 8 mmHg    Ao peak simon 1.37 m/s    Ao VTI 25.10 cm    LVOT peak VTI 13.50 cm    AV valve area 1.83 cm²    AV Velocity Ratio 0.53     AV index (prosthetic) 0.54     GINGER by Velocity Ratio 1.81 cm²    Mr max simon 3.61 m/s    MV stenosis pressure 1/2 time 34.53 ms    MV valve area p 1/2 method 6.37 cm2    TV mean gradient 32 mmHg    Triscuspid Valve Regurgitation Peak Gradient 32 mmHg    PV mean gradient 1 mmHg    RVOT peak simon 0.49 m/s    Ao root annulus 3.49 cm    STJ 3.66 cm    Ascending aorta 3.62 cm    IVC diameter 2.65 cm    Mean e' 0.08 m/s    ZLVIDS -5.06     ZLVIDD -10.82     LA Volume Index 42.4 mL/m2    LA volume 109.94 cm3    LA WIDTH 4.4 cm    RA Width 3.3 cm    TV resting pulmonary artery pressure 47 mmHg    RV TB RVSP 18 mmHg    Est. RA pres 15 mmHg    Narrative      Left Ventricle: The left ventricle is moderately dilated. Normal wall   thickness. Severe global hypokinesis present. There is severely reduced   systolic function with a visually estimated ejection fraction of 25 - 30%.   Grade II diastolic dysfunction.    Right Ventricle: Normal right ventricular cavity size. Wall thickness   is normal. Right ventricle wall motion  is normal. Systolic function is   mildly reduced.    Left Atrium: Left atrium is moderately dilated.    Right Atrium: Right atrium is mildly dilated.    Aortic Valve: There is mild aortic  regurgitation.    Mitral Valve: There is mild regurgitation.    Tricuspid Valve: There is mild regurgitation.    Pulmonary Artery: The estimated pulmonary artery systolic pressure is   47 mmHg.    IVC/SVC: Elevated venous pressure at 15 mmHg.     , EKG: Reviewed, and X-Ray: CXR: X-Ray Chest 1 View (CXR): No results found for this visit on 02/11/24. and X-Ray Chest PA and Lateral (CXR): No results found for this visit on 02/11/24.

## 2024-02-13 NOTE — PLAN OF CARE
Pt resting in bed. Wife at bedside. Pt denied pain, chest pain, dizziness, or weakness overnight. Stable at this time.

## 2024-02-13 NOTE — PLAN OF CARE
A234/A234 ROGE Matthew is a 68 y.o.male admitted on 2/11/2024 for Near syncope   Code Status: Full Code MRN: [id number]   Review of patient's allergies indicates:   Allergen Reactions    Statins-hmg-coa reductase inhibitors Other (See Comments)     Past Medical History:   Diagnosis Date    CAD (coronary artery disease)     Carotid artery dissection     CHF (congestive heart failure)     Gout, unspecified     HLD (hyperlipidemia)     HTN (hypertension)     Hyperparathyroidism, unspecified     JAMES (obstructive sleep apnea)     Prostate cancer     SAH (subarachnoid hemorrhage) 01/2023    Stroke 2020    Type 2 diabetes mellitus without complications       PRN meds    dextrose 10%, 12.5 g, PRN  dextrose 10%, 25 g, PRN  glucagon (human recombinant), 1 mg, PRN  glucose, 16 g, PRN  glucose, 24 g, PRN  insulin aspart U-100, 0-5 Units, QID (AC + HS) PRN  ondansetron, 4 mg, Q8H PRN  sodium chloride 0.9%, 10 mL, PRN      AVS Discharge instructions received and reviewed with pt and family at bedside.  Pt voiced understanding and all questions answered to satisfaction.  Stressed importance to making and keeping all follow up appointments.  Medications at bedside and reviewed with pt.  Tele monitor removed and brought to monitor tech.  IV d/c'd with tip intact, pressure dressing applied.  Pt transported to front of hospital via w/c to be discharged home.      Orientation: oriented x 4  Radha Coma Scale Score: 15     Lead Monitored: Lead II Rhythm: normal sinus rhythm Frequency/Ectopy: PVCs  Cardiac/Telemetry Box Number: [id number]  VTE Required Core Measure: Pharmacological prophylaxis initiated/maintained Last Bowel Movement: 02/13/24  Diet diabetic Cardiac (Low Na/Chol); 2000 Calorie; Fluid - 1500mL  Diet Cardiac  Diet diabetic     Lenny Score: 20  Fall Risk Score: 10  Accucheck [x]   Freq? ACHS     Lines/Drains/Airways       None                      Problem: Adult Inpatient Plan of Care  Goal: Plan of Care  Review  Outcome: Met  Goal: Patient-Specific Goal (Individualized)  Outcome: Met  Goal: Absence of Hospital-Acquired Illness or Injury  Outcome: Met  Goal: Optimal Comfort and Wellbeing  Outcome: Met  Goal: Readiness for Transition of Care  Outcome: Met     Problem: Impaired Wound Healing  Goal: Optimal Wound Healing  Outcome: Met     Problem: Fluid and Electrolyte Imbalance (Acute Kidney Injury/Impairment)  Goal: Fluid and Electrolyte Balance  Outcome: Met     Problem: Oral Intake Inadequate (Acute Kidney Injury/Impairment)  Goal: Optimal Nutrition Intake  Outcome: Met     Problem: Renal Function Impairment (Acute Kidney Injury/Impairment)  Goal: Effective Renal Function  Outcome: Met     Problem: Diabetes Comorbidity  Goal: Blood Glucose Level Within Targeted Range  Outcome: Met     Problem: Fall Injury Risk  Goal: Absence of Fall and Fall-Related Injury  Outcome: Met

## 2024-02-13 NOTE — PLAN OF CARE
[facility] - Telemetry (Hospital)  Discharge Final Note    Primary Care Provider: Cheri Dietz    Expected Discharge Date: 2/13/2024    Final Discharge Note (most recent)       Final Note - 02/13/24 1143          Final Note    Assessment Type Final Discharge Note     Anticipated Discharge Disposition Home-Health Care Svc        Post-Acute Status    Post-Acute Authorization Home Health     Home Health Status Pending Payor Review     Coverage [employer]     Discharge Delays None known at this time                     Important Message from Medicare  Important Message from Medicare regarding Discharge Appeal Rights: Given to patient/caregiver, Explained to patient/caregiver, Signed/date by patient/caregiver     Date IMM was signed: 02/13/24  Time IMM was signed: 1039    Contact Info       [facility]   Relationship: PCP - General    [address] [address]   Phone: [phone]       Next Steps: Schedule an appointment as soon as possible for a visit in 3 day(s)    Destiney Mccain PA   Specialty: Transplant    [address] [address]   Phone: [phone]       Next Steps: Schedule an appointment as soon as possible for a visit    Genie Claros MD   Specialty: Interventional Cardiology, Cardiology    [address]  [place] [address]   Phone: [phone]       Next Steps: Schedule an appointment as soon as possible for a visit    [facility]   Specialty: Home Health Services, Home Therapy Services, Home Living Aide Services    [address]  [place] [address]   Phone: [phone]       Next Steps: Call in 3 day(s)    Instructions: home health  [facility] is pending VA insurance authorization to set up services for you. If no one has reached out from [facility] in 3 days, after your discharge, please give them a call.          DC Disposition: [facility]   Family Notified: Patient  and spouse by MD/ RN at bedside  Transportation: personal transportation    Patient needed Home Health services set up upon discharge. Patient's VA insurance is pending approval for neville Martínez to begin seeing Patient. [facility]'s information has been added to Patient's AVS.     Patient has resources to schedule [facility].

## 2024-02-13 NOTE — PROGRESS NOTES
[facility] - Telemetry ([facility])  Cardiology  Progress Note    Patient Name: Ron Matthew  MRN: [id number]  Admission Date: 2/11/2024  Hospital Length of Stay: 1 days  Code Status: Full Code   Attending Physician: Loretta Valadez MD   Primary Care Physician: Cheri Dietz  Expected Discharge Date: 2/13/2024  Principal Problem:Near syncope    Subjective:   HPI:  69 yo male with CAD, CHF-EF 25%, DM, HTN, HLD, Gout, JAMES, Prostate cancer, Stroke with right sided hemiparesis in 2020, Carotid dissection and SAH 1/2023 who presented to ED with near syncope. The patient was just discharged from hospital yesterday for CHF exacerbation. He was discharged on Lasix 40mg daily and Entresto (new Rx). The pt's wife states he took his night time Coreg at approx 2130 and went to bed. Ge got up to use the bathroom. After he had a BM, he was diaphoretic and unresponsive. When EMS arrived, pt was diaphoretic and lethargic with a GCS of 9. Pt also experienced 1 episode of vomiting PTA. Pt's symptoms have improved and he is back to his normal mental status.      In the ED, BP 78/56. Oxygenation normal. Labs revealed Hct 57, mild MAHAD with sCr 1.6. BNP has improved from 1238>131. Troponin 0.090. EKG showed NSR with 1st degree Av block with TWI. CXR- clear            Upon my interview.  Is awake and oriented.    He passed out after he stood up in the bathroom.    BNP seen to have corrected from 1200 to 130.  He appears euvolemic   Shows MAHAD on his blood work.    Of note he was borderline hypertensive during his hospital visit, and was switched from lisinopril to lower equivalent dose of Entresto after 36 hours washout.      Hospital Course:   2/12/24-Patient seen and examined today, resting in bed with wife present. Feels ok. No SOB/CP. BP still soft, but in low 100's this AM. Creatinine 1.3, trending down.    2/13/24-Patient seen and examined today, resting in bed. No AEON. Did notice some mild dizziness when walking to  the bathroom. No near syncope/syncope. Denies CP/SOB. Creatinine 1.3. Systolic BP low 100's, low dose Toprol XL initiated. Discussed with wife will need to closely monitor BP at home and f/u in clinic.        Review of Systems   Constitutional: Positive for malaise/fatigue.   HENT: Negative.     Eyes: Negative.    Cardiovascular: Negative.    Respiratory: Negative.     Endocrine: Negative.    Hematologic/Lymphatic: Negative.    Skin: Negative.    Musculoskeletal: Negative.    Gastrointestinal: Negative.    Genitourinary: Negative.    Neurological:  Positive for dizziness (mild).   Psychiatric/Behavioral: Negative.     Allergic/Immunologic: Negative.      Objective:     Vital Signs (Most Recent):  Temp: 97.7 °F (36.5 °C) (02/13/24 0855)  Pulse: 81 (02/13/24 0855)  Resp: 16 (02/13/24 0855)  BP: 108/64 (02/13/24 0855)  SpO2: 96 % (02/13/24 0855) Vital Signs (24h Range):  Temp:  [97.7 °F (36.5 °C)-98.6 °F (37 °C)] 97.7 °F (36.5 °C)  Pulse:  [72-89] 81  Resp:  [16-22] 16  SpO2:  [93 %-96 %] 96 %  BP: ()/(56-80) 108/64     Weight: 124.1 kg (273 lb 9.5 oz)  Body mass index is 38.16 kg/m².     SpO2: 96 %         Intake/Output Summary (Last 24 hours) at 2/13/2024 1038  Last data filed at 2/13/2024 1036  Gross per 24 hour   Intake 1240 ml   Output 300 ml   Net 940 ml       Lines/Drains/Airways       Peripheral Intravenous Line  Duration                  Peripheral IV - Double Lumen 02/11/24 0309 16 G Anterior;Distal;Right Forearm 2 days         Peripheral IV - Single Lumen 02/11/24 0233 18 G Anterior;Right Forearm 2 days                       Physical Exam  Vitals and nursing note reviewed.   Constitutional:       General: He is not in acute distress.     Appearance: Normal appearance. He is well-developed. He is not diaphoretic.   HENT:      Head: Normocephalic and atraumatic.   Eyes:      General:         Right eye: No discharge.         Left eye: No discharge.      Pupils: Pupils are equal, round, and reactive to  "light.   Neck:      Thyroid: No thyromegaly.      Vascular: No JVD.      Trachea: No tracheal deviation.   Cardiovascular:      Rate and Rhythm: Normal rate and regular rhythm.      Heart sounds: S1 normal and S2 normal. No murmur heard.  Pulmonary:      Effort: Pulmonary effort is normal. No respiratory distress.      Breath sounds: Normal breath sounds. No wheezing or rales.   Abdominal:      General: There is no distension.   Musculoskeletal:      Cervical back: Neck supple.      Right lower leg: No edema.      Left lower leg: No edema.   Skin:     General: Skin is warm and dry.      Findings: No erythema.   Neurological:      General: No focal deficit present.      Mental Status: He is alert and oriented to person, place, and time.   Psychiatric:         Mood and Affect: Mood normal.         Behavior: Behavior normal.            Significant Labs: CMP   Recent Labs   Lab 02/12/24  0812 02/13/24  0400    137   K 3.6 3.8   CL 98 98   CO2 26 30*   * 128*   BUN 25* 28*   CREATININE 1.3 1.3   CALCIUM 8.5* 8.7   ANIONGAP 13 9   , CBC   Recent Labs   Lab 02/12/24  0812   WBC 3.78*   HGB 17.4   HCT 52.2      , Troponin No results for input(s): "TROPONINI" in the last 48 hours., and All pertinent lab results from the last 24 hours have been reviewed.    Significant Imaging: Echocardiogram: Transthoracic echo (TTE) complete (Cupid Only):   Results for orders placed or performed during the hospital encounter of 02/06/24   Echo   Result Value Ref Range    BSA 2.69 m2    Greene's Biplane MOD Ejection Fraction 31 %    LVOT stroke volume 45.85 cm3    LVIDd 6.07 (A) 3.5 - 6.0 cm    LV Systolic Volume 150.23 mL    LV Systolic Volume Index 58.0 mL/m2    LVIDs 5.55 (A) 2.1 - 4.0 cm    LV Diastolic Volume 184.97 mL    LV Diastolic Volume Index 71.42 mL/m2    IVS 1.52 (A) 0.6 - 1.1 cm    LVOT diameter 2.08 cm    LVOT area 3.4 cm2    FS 9 (A) 28 - 44 %    Left Ventricle Relative Wall Thickness 0.50 cm    Posterior " Wall 1.52 (A) 0.6 - 1.1 cm    LV mass 443.56 g    LV Mass Index 171 g/m2    MV Peak E Simon 0.77 m/s    TDI LATERAL 0.09 m/s    TDI SEPTAL 0.07 m/s    E/E' ratio 9.63 m/s    MV Peak A Simon 0.31 m/s    TR Max Simon 2.82 m/s    E/A ratio 2.48     IVRT 60.89 msec    E wave deceleration time 119.06 msec    LV SEPTAL E/E' RATIO 11.00 m/s    LV LATERAL E/E' RATIO 8.56 m/s    LVOT peak simon 0.73 m/s    Left Ventricular Outflow Tract Mean Velocity 0.63 cm/s    Left Ventricular Outflow Tract Mean Gradient 1.63 mmHg    RVDD 2.96 cm    RVOT peak VTI 7.1 cm    TAPSE 1.64 cm    LA size 4.93 cm    Left Atrium Minor Axis 5.85 cm    Left Atrium Major Axis 6.08 cm    RA Major Axis 5.15 cm    AV regurgitation pressure 1/2 time [id number] ms    AR Max Simon 1.88 m/s    AV mean gradient 5 mmHg    AV peak gradient 8 mmHg    Ao peak simon 1.37 m/s    Ao VTI 25.10 cm    LVOT peak VTI 13.50 cm    AV valve area 1.83 cm²    AV Velocity Ratio 0.53     AV index (prosthetic) 0.54     GINGER by Velocity Ratio 1.81 cm²    Mr max simon 3.61 m/s    MV stenosis pressure 1/2 time 34.53 ms    MV valve area p 1/2 method 6.37 cm2    TV mean gradient 32 mmHg    Triscuspid Valve Regurgitation Peak Gradient 32 mmHg    PV mean gradient 1 mmHg    RVOT peak simon 0.49 m/s    Ao root annulus 3.49 cm    STJ 3.66 cm    Ascending aorta 3.62 cm    IVC diameter 2.65 cm    Mean e' 0.08 m/s    ZLVIDS -5.06     ZLVIDD -10.82     LA Volume Index 42.4 mL/m2    LA volume 109.94 cm3    LA WIDTH 4.4 cm    RA Width 3.3 cm    TV resting pulmonary artery pressure 47 mmHg    RV TB RVSP 18 mmHg    Est. RA pres 15 mmHg    Narrative      Left Ventricle: The left ventricle is moderately dilated. Normal wall   thickness. Severe global hypokinesis present. There is severely reduced   systolic function with a visually estimated ejection fraction of 25 - 30%.   Grade II diastolic dysfunction.    Right Ventricle: Normal right ventricular cavity size. Wall thickness   is normal. Right ventricle  wall motion  is normal. Systolic function is   mildly reduced.    Left Atrium: Left atrium is moderately dilated.    Right Atrium: Right atrium is mildly dilated.    Aortic Valve: There is mild aortic regurgitation.    Mitral Valve: There is mild regurgitation.    Tricuspid Valve: There is mild regurgitation.    Pulmonary Artery: The estimated pulmonary artery systolic pressure is   47 mmHg.    IVC/SVC: Elevated venous pressure at 15 mmHg.     , EKG: Reviewed, and X-Ray: CXR: X-Ray Chest 1 View (CXR): No results found for this visit on 02/11/24. and X-Ray Chest PA and Lateral (CXR): No results found for this visit on 02/11/24.  Assessment and Plan:   Patient who presents with IVVD/dehydration/near syncope. Stable CV wise. No CHF symptoms. BP improving but still on low side. Low dose BB resumed. Can utilize Lasix prn as OP. Close CHF clinic f/u.    * Near syncope  Seen plan below    MAHAD (acute kidney injury)  Holding Entresto and Lasix    Hypotension  Hold BP meds    Chronic combined systolic and diastolic congestive heart failure  No signs of fluid overload.    Possibly hypovolemic may be over diuresed on his last visit.    Hold off on Lasix for now.    Hold Entresto given his MAHAD.    Currently off beta-blockers as blood pressure currently is 90's/60.    When more stable we will change his carvedilol to Toprol 25 mg XL.    And will decrease his Entresto to lower the b.i.d..  Wife is not sure if he had a blue ink discharge from the device  We will get it checked.      2/12/24  -Stable, compensated  -No SOB  -Creatinine improving  -Recommend low dose Toprol XL 25 mg once able to tolerate  -Lasix prn  -Can re-initiate Entresto as OP once BP/creatinine stable    2/13/24  -Stable, compensated  -No SOB/CHF symptoms  -Low dose BB resumed  -Advised Lasix prn as OP  -Close f/u in CHF clinic          VTE Risk Mitigation (From admission, onward)           Ordered     enoxaparin injection 40 mg  Daily         02/11/24 9714      IP VTE HIGH RISK PATIENT  Once         02/11/24 0458     Place sequential compression device  Until discontinued         02/11/24 0458                    Genet Payne PA-C  Cardiology  [facility] - Telemetry ([facility])

## 2024-02-13 NOTE — PT/OT/SLP PROGRESS
"Physical Therapy  Treatment    Ron Matthew   MRN: [id number]   Admitting Diagnosis: Near syncope       PT Start Time: 1055     PT Stop Time: 1105    PT Total Time (min): 10 min       Billable Minutes:  Gait Training 10    Treatment Type: Treatment  PT/PTA: PT     Number of PTA visits since last PT visit: 0       General Precautions: Standard, fall (hypotensive)  Orthopedic Precautions: N/A  Braces: N/A  Respiratory Status: Room air    Spiritual, Cultural Beliefs, Evangelical Practices, Values that Affect Care: no    Subjective:  Communicated with nursing (Kimberly) and performed chart review via epic system prior to session.  Pt agreeable to PT services "Walk him for an hour" per wife at bedside    Pain/Comfort  Pain Rating 1: 0/10  Pain Rating Post-Intervention 1: 0/10    Objective:   Patient found with: peripheral IV, telemetry (life vest)    Functional Mobility:  Bed Mobility:    Facilitated supine     Transfers:   Sit<>stand with no AD, stand pivot RW    Gait:    200ft x 2 SBA/CGA with RW, demonstrates slow but steady pace, wide YEE      Balance:   Dynamic Sit: GOOD-: Incosistently Maintains balance through MODERATE excursions of active trunk movement,     Dynamic stand: FAIR+: Needs CLOSE SUPERVISION during gait and is able to right self with minor LOB       Treatment and Education:  Educated pt on benefits of consistent participation in PT services to meet functional goals. Educated pt on seated therex to promote strength, circulation and joint mobility. Exercises included AP, LAQ, and GS. Educated to perform exercises intermittently throughout day to tolerance. Educated pt on importance of sitting OOB to promote endurance and overall activity tolerance. Educated pt on call don't fall policy and use of call button to alert nursing staff of needs (including to assist in/out of bed). Pt expressed understanding.      AM-PAC 6 CLICK MOBILITY  How much help from another person does this patient currently need? "   1 = Unable, Total/Dependent Assistance  2 = A lot, Maximum/Moderate Assistance  3 = A little, Minimum/Contact Guard/Supervision  4 = None, Modified [place]/Independent    Turning over in bed (including adjusting bedclothes, sheets and blankets)?: 3  Sitting down on and standing up from a chair with arms (e.g., wheelchair, bedside commode, etc.): 3  Moving from lying on back to sitting on the side of the bed?: 3  Moving to and from a bed to a chair (including a wheelchair)?: 3  Need to walk in hospital room?: 3  Climbing 3-5 steps with a railing?: 1  Basic Mobility Total Score: 16    AM-PAC Raw Score CMS G-Code Modifier Level of Impairment Assistance   6 % Total / Unable   7 - 9 CM 80 - 100% Maximal Assist   10 - 14 CL 60 - 80% Moderate Assist   15 - 19 CK 40 - 60% Moderate Assist   20 - 22 CJ 20 - 40% Minimal Assist   23 CI 1-20% SBA / CGA   24 CH 0% Independent/ Mod I     Patient left supine with all lines intact, call button in reach, and nursing notified.    Assessment:  Ron Matthew is a 68 y.o. male with a medical diagnosis of Near syncope and presents with deficits in overall mobility but participated well. Pt will benefit from continued PT services.    Rehab identified problem list/impairments: weakness, impaired endurance, impaired functional mobility, gait instability, impaired balance, decreased coordination, decreased lower extremity function, decreased safety awareness    Rehab potential is good.    Activity tolerance: Good    Discharge recommendations: Low Intensity Therapy      Barriers to discharge:      Equipment recommendations: none     GOALS:   Multidisciplinary Problems       Physical Therapy Goals          Problem: Physical Therapy    Goal Priority Disciplines Outcome Goal Variances Interventions   Physical Therapy Goal     PT, PT/OT Ongoing, Progressing     Description: Goals to be met by 2/26/24.  1. Pt will complete bed mobility MOD I.  2. Pt will complete sit to  stand MOD I.  3. Pt will ambulate 200ft MOD I using RW.  4. Pt will increase AMPAC score by 2 points to progress functional mobility.                       PLAN:    Patient to be seen 3 x/week to address the above listed problems via gait training, therapeutic activities, therapeutic exercises, neuromuscular re-education  Plan of Care expires: 02/26/24  Plan of Care reviewed with: patient, spouse         02/13/2024

## 2024-02-14 NOTE — PROGRESS NOTES
Subjective:   Patient ID:  Ron Matthew is a 68 y.o. male who presents for evaluation of No chief complaint on file.      HPI      Ron Matthew is a 68 year old male who presents to Arrhythmia clinic for CHf hospital follow up.     Patient is a 67 yo male with CAD, CHF-EF 25%, DM, HTN, HLD, Gout, JAMES, Prostate cancer, Stroke with right sided hemiparesis in 2020, Carotid dissection and SAH 1/2023 who presented to ED with near syncope. The patient was just discharged from hospital yesterday for CHF exacerbation. He was discharged on Lasix 40mg daily and Entresto (new Rx). The pt's wife states he took his night time Coreg at approx 2130 and went to bed. Ge got up to use the bathroom. After he had a BM, he was diaphoretic and unresponsive. When EMS arrived, pt was diaphoretic and lethargic with a GCS of 9. Pt also experienced 1 episode of vomiting PTA. Pt's symptoms have improved and he is back to his normal mental status.      In the ED, BP 78/56. Lab revealed Hct 57, mild MAHAD with sCr 1.6. BNP has improved from 1238>131. Troponin 0.090. EKG showed NSR with 1st degree Av block with TWI. CXR- clear         Hospital Course:   Cardiology consulted to assist with management. PT/OT rec low intensity therapy on discharge. Orthostatics +. Diuretics, entresto discontinued with improvement in BP. Systolics remained in low 100s, pt had improvement in fatigue/dizziness. Low dose toprol added after discussion with cardiology. Pt seen and examined on day of discharge and appears stable for discharge home today with HH. Discussed with cardiology, will continue toprol 12.5, lasix as needed and close outpatient followup with cardiology for further medication management/adjustment. Discharge instruction and return precautions discussed at length with wife and patient, all questions answered to their satisfaction. Followup with PCP and cardiology within 1 week.     He returns today and states he is doing ok.  He  feels a little better today in office.     Lasix on hold for now given recent admission due to MAHAD and near syncopal events.     [facility]     Wearing LifeVest.     Does endorse some degree of dizziness with exertional activities.    BP stable today in office.    Reviewed CHF educational booklet in office with patient and wife.     Reports long standing history of diabetes >10 years    Has been mindful of sodium and fluid restriction since hospital discharge.     Denies chest pain or anginal equivalents. Denies orthopnea, PND or abdominal bloating. Reports regular walking without any issues lately. NO leg swelling or claudications. No recent falls, syncope or near syncopal events. Reports compliance with medications and dietary restrictions. NO CNS complaints to suggest TIA or CVA today. No signs of abnormal bleeding on ASA and Plavix.        Past Medical History:   Diagnosis Date    CAD (coronary artery disease)     Carotid artery dissection     CHF (congestive heart failure)     Gout, unspecified     HLD (hyperlipidemia)     HTN (hypertension)     Hyperparathyroidism, unspecified     JAMES (obstructive sleep apnea)     Prostate cancer     SAH (subarachnoid hemorrhage) 01/2023    Stroke 2020    Type 2 diabetes mellitus without complications        Past Surgical History:   Procedure Laterality Date    ANGIOGRAM, CAROTID      ANGIOGRAM, VERTEBRAL      CATHETERIZATION OF BOTH LEFT AND RIGHT HEART N/A 2/8/2024    Procedure: CATHETERIZATION, HEART, BOTH LEFT AND RIGHT;  Surgeon: Genie Claros MD;  Location: [facility] CATH LAB;  Service: Cardiology;  Laterality: N/A;    PARATHYROIDECTOMY         Social History     Tobacco Use    Smoking status: Never   Substance Use Topics    Alcohol use: Never    Drug use: Never       Family History   Problem Relation Age of Onset    Diabetes Mother     Hypertension Mother     Hypertension Father        Wt Readings from Last 3 Encounters:   02/15/24 127 kg (279 lb  15.8 oz)   02/11/24 124.1 kg (273 lb 9.5 oz)   02/10/24 117.3 kg (258 lb 9.6 oz)     Temp Readings from Last 3 Encounters:   02/13/24 98.7 °F (37.1 °C)   02/10/24 98 °F (36.7 °C) (Oral)     BP Readings from Last 3 Encounters:   02/15/24 (!) 131/92   02/13/24 115/75   02/10/24 (!) 102/59     Pulse Readings from Last 3 Encounters:   02/15/24 85   02/13/24 82   02/10/24 70       Current Outpatient Medications on File Prior to Visit   Medication Sig Dispense Refill    aspirin 81 mg Cap Take 81 mg by mouth once daily. 30 capsule 0    cholecalciferol, vitamin D3, (VITAMIN D3) 50 mcg (2,000 unit) Cap capsule Take 1 capsule by mouth once daily.      clopidogreL (PLAVIX) 75 mg tablet Take 1 tablet (75 mg total) by mouth once daily. 30 tablet 0    furosemide (LASIX) 40 MG tablet Take 0.5 tablets (20 mg total) by mouth daily as needed (shortness of breath, leg swelling). 15 tablet 0    levothyroxine (SYNTHROID) 25 MCG tablet Take 1 tablet by mouth every morning.      magnesium oxide (MAG-OX) 250 mg magnesium Tab Take 1 tablet by mouth every morning.      metFORMIN (GLUCOPHAGE) 1000 MG tablet Take 1,000 mg by mouth 2 (two) times daily with meals.      metoprolol succinate (TOPROL-XL) 25 MG 24 hr tablet Take 0.5 tablets (12.5 mg total) by mouth once daily. 15 tablet 0    [DISCONTINUED] empagliflozin (JARDIANCE) 25 mg tablet Take 1 tablet by mouth once daily.      allopurinoL (ZYLOPRIM) 100 MG tablet Take 100 mg by mouth daily as needed.      DOCOSAHEXAENOIC ACID ORAL Take 1 capsule by mouth once daily.       No current facility-administered medications on file prior to visit.       No cardiac monitor results found for the past 12 months    Results for orders placed during the hospital encounter of 02/06/24    Echo    Interpretation Summary    Left Ventricle: The left ventricle is moderately dilated. Normal wall thickness. Severe global hypokinesis present. There is severely reduced systolic function with a visually estimated  ejection fraction of 25 - 30%. Grade II diastolic dysfunction.    Right Ventricle: Normal right ventricular cavity size. Wall thickness is normal. Right ventricle wall motion  is normal. Systolic function is mildly reduced.    Left Atrium: Left atrium is moderately dilated.    Right Atrium: Right atrium is mildly dilated.    Aortic Valve: There is mild aortic regurgitation.    Mitral Valve: There is mild regurgitation.    Tricuspid Valve: There is mild regurgitation.    Pulmonary Artery: The estimated pulmonary artery systolic pressure is 47 mmHg.    IVC/SVC: Elevated venous pressure at 15 mmHg.    No results found for this or any previous visit.        Results for orders placed or performed during the hospital encounter of 02/11/24   EKG 12-lead    Collection Time: 02/11/24  2:39 AM   Result Value Ref Range    QRS Duration 112 ms    [facility] QTC Calculation 481 ms    Narrative    Test Reason : R41.82,    Vent. Rate : 074 BPM     Atrial Rate : 074 BPM     P-R Int : 210 ms          QRS Dur : 112 ms      QT Int : 434 ms       P-R-T Axes : 056 224 245 degrees     QTc Int : 481 ms    Sinus rhythm with 1st degree A-V block with Premature atrial complexes  Possible Left atrial enlargement  Right superior axis deviation  Cannot rule out Anterior infarct ,age undetermined  T wave abnormality, consider inferolateral ischemia  Abnormal ECG  When compared with ECG of 06-FEB-2024 00:16,  T wave inversion now evident in Lateral leads  Confirmed by NATIVIDAD STONER MD ([id number]) on 2/11/2024 3:03:52 PM    Referred By: [email or web address]   SELF           Confirmed By:NATIVIDAD STONER MD         Review of Systems   Constitutional: Positive for malaise/fatigue.   HENT:  Negative for hearing loss and hoarse voice.    Eyes:  Negative for blurred vision and visual disturbance.   Cardiovascular:  Negative for chest pain, claudication, dyspnea on exertion, irregular heartbeat, leg swelling, near-syncope, orthopnea, palpitations, paroxysmal nocturnal  dyspnea and syncope.   Respiratory:  Negative for cough, hemoptysis, shortness of breath, sleep disturbances due to breathing, snoring and wheezing.    Endocrine: Negative for cold intolerance and heat intolerance.   Hematologic/Lymphatic: Does not bruise/bleed easily.   Skin:  Negative for color change, dry skin and nail changes.   Musculoskeletal:  Positive for arthritis and back pain. Negative for joint pain and myalgias.   Gastrointestinal:  Negative for bloating, abdominal pain, constipation, nausea and vomiting.   Genitourinary:  Negative for dysuria, flank pain, hematuria and hesitancy.   Neurological:  Positive for dizziness and weakness. Negative for headaches, light-headedness, loss of balance, numbness and paresthesias.   Psychiatric/Behavioral:  Negative for altered mental status.    Allergic/Immunologic: Negative for environmental allergies.         Objective:BP (!) 131/92 (BP Location: Left arm, Patient Position: Sitting)   Pulse 85   Wt 127 kg (279 lb 15.8 oz)   SpO2 98%   BMI 39.05 kg/m²      Physical Exam  Vitals and nursing note reviewed.   Constitutional:       General: He is not in acute distress.     Appearance: Normal appearance. He is well-developed. He is obese. He is not ill-appearing.   HENT:      Head: Normocephalic and atraumatic.      Nose: Nose normal.      Mouth/Throat:      Mouth: Mucous membranes are moist.   Eyes:      Pupils: Pupils are equal, round, and reactive to light.   Neck:      Thyroid: No thyromegaly.      Vascular: No JVD.      Trachea: No tracheal deviation.   Cardiovascular:      Rate and Rhythm: Normal rate and regular rhythm.      Chest Wall: PMI is not displaced.      Pulses: Intact distal pulses.           Radial pulses are 2+ on the right side and 2+ on the left side.        Dorsalis pedis pulses are 2+ on the right side and 2+ on the left side.      Heart sounds: S1 normal and S2 normal. Heart sounds not distant. No murmur heard.     Comments:  "+LifeVest  Pulmonary:      Effort: Pulmonary effort is normal. No respiratory distress.      Breath sounds: Normal breath sounds. No wheezing.   Abdominal:      General: Bowel sounds are normal. There is no distension.      Palpations: Abdomen is soft.      Tenderness: There is no abdominal tenderness.   Musculoskeletal:         General: No swelling. Normal range of motion.      Cervical back: Full passive range of motion without pain, normal range of motion and neck supple.   Skin:     General: Skin is warm and dry.      Capillary Refill: Capillary refill takes less than 2 seconds.      Nails: There is no clubbing.   Neurological:      General: No focal deficit present.      Mental Status: He is alert and oriented to person, place, and time.   Psychiatric:         Speech: Speech normal.         Behavior: Behavior normal.         Thought Content: Thought content normal.         Judgment: Judgment normal.         Lab Results   Component Value Date    CHOL 119 02/03/2023     Lab Results   Component Value Date    HDL 33 (L) 02/03/2023     Lab Results   Component Value Date    LDLCALC 70 02/03/2023     Lab Results   Component Value Date    TRIG 80 02/03/2023     No results found for: "CHOLHDL"    Chemistry        Component Value Date/Time     02/13/2024 0400    K 3.8 02/13/2024 0400    CL 98 02/13/2024 0400    CO2 30 (H) 02/13/2024 0400    BUN 28 (H) 02/13/2024 0400    CREATININE 1.3 02/13/2024 0400     (H) 02/13/2024 0400        Component Value Date/Time    CALCIUM 8.7 02/13/2024 0400    ALKPHOS 72 02/11/2024 0258    AST 28 02/11/2024 0258    ALT 29 02/11/2024 0258    BILITOT 2.0 (H) 02/11/2024 0258    ESTGFRAFRICA 100 02/03/2023 0519    ESTGFRAFRICA 98 08/05/2020 0828          No results found for: "TSH"  Lab Results   Component Value Date    INR 1.0 01/31/2023     Lab Results   Component Value Date    WBC 3.78 (L) 02/12/2024    HGB 17.4 02/12/2024    HCT 52.2 02/12/2024    MCV 88 02/12/2024     " 02/12/2024        Assessment:      1. Severe obesity (BMI 35.0-39.9) with comorbidity    2. MAHAD (acute kidney injury)    3. Near syncope    4. Chronic combined systolic and diastolic congestive heart failure    5. Internal carotid artery dissection    6. Carotid stenosis, asymptomatic, bilateral    7. Primary hypertension    8. Coronary artery disease of native artery of native heart with stable angina pectoris    9. Type 2 diabetes mellitus with hyperglycemia, without long-term current use of insulin    10. Uses LifeVest defibrillator    11. JAMES (obstructive sleep apnea)    12. Statin intolerance        Plan:     CHF SYNOPSIS:    GDMT:  ACE/ARB/ARNI: none due to recent MAHAD  Beta Blocker: Toprol XL 12.5 mg daily  MRA: none  SGLT2: Jardiance 10 mg daily  Diuretic: lasix PRN (currently on hold)    Last BMP:2/13/24  Next BMP Due: 2 weeks    Last BNP:2/11/2024  Next BNP Due:2 weeks    Last Echo:2/6/2024  Repeat Echo Due:3-4 months post GDMT optimization    Call if any issues with CHF symptoms prior to next visit  Daily weights  Call if increase in weight by 3 pounds in 1 day or 5 pounds in 1 week  Profile BP at home  Can start home PT/OT as scheduled  Consider Cardiac Rehab in near future.     RTC in 2 weeks with BMP/BNP    Nicole May, FNP-C [facility]

## 2024-02-15 ENCOUNTER — OFFICE VISIT (OUTPATIENT)
Dept: CARDIOLOGY | Facility: CLINIC | Age: 69
End: 2024-02-15
Payer: MEDICARE

## 2024-02-15 ENCOUNTER — HOSPITAL ENCOUNTER (OUTPATIENT)
Dept: CARDIOLOGY | Facility: HOSPITAL | Age: 69
Discharge: HOME | End: 2024-02-15
Payer: MEDICARE

## 2024-02-15 VITALS
DIASTOLIC BLOOD PRESSURE: 92 MMHG | OXYGEN SATURATION: 98 % | SYSTOLIC BLOOD PRESSURE: 131 MMHG | HEART RATE: 85 BPM | WEIGHT: 280 LBS | BODY MASS INDEX: 39.05 KG/M2

## 2024-02-15 DIAGNOSIS — E11.65 TYPE 2 DIABETES MELLITUS WITH HYPERGLYCEMIA, WITHOUT LONG-TERM CURRENT USE OF INSULIN: ICD-10-CM

## 2024-02-15 DIAGNOSIS — E66.01 SEVERE OBESITY (BMI 35.0-39.9) WITH COMORBIDITY: Primary | ICD-10-CM

## 2024-02-15 DIAGNOSIS — I10 HYPERTENSION, UNSPECIFIED TYPE: ICD-10-CM

## 2024-02-15 DIAGNOSIS — I10 HYPERTENSION, UNSPECIFIED TYPE: Primary | ICD-10-CM

## 2024-02-15 DIAGNOSIS — Z78.9 STATIN INTOLERANCE: ICD-10-CM

## 2024-02-15 DIAGNOSIS — R55 NEAR SYNCOPE: ICD-10-CM

## 2024-02-15 DIAGNOSIS — G47.33 OSA (OBSTRUCTIVE SLEEP APNEA): ICD-10-CM

## 2024-02-15 DIAGNOSIS — I50.42 CHRONIC COMBINED SYSTOLIC AND DIASTOLIC CONGESTIVE HEART FAILURE: ICD-10-CM

## 2024-02-15 DIAGNOSIS — I77.71 INTERNAL CAROTID ARTERY DISSECTION: ICD-10-CM

## 2024-02-15 DIAGNOSIS — I10 PRIMARY HYPERTENSION: ICD-10-CM

## 2024-02-15 DIAGNOSIS — I25.118 CORONARY ARTERY DISEASE OF NATIVE ARTERY OF NATIVE HEART WITH STABLE ANGINA PECTORIS: ICD-10-CM

## 2024-02-15 DIAGNOSIS — N17.9 AKI (ACUTE KIDNEY INJURY): ICD-10-CM

## 2024-02-15 DIAGNOSIS — Z95.810 USES LIFEVEST DEFIBRILLATOR: ICD-10-CM

## 2024-02-15 DIAGNOSIS — I65.23 CAROTID STENOSIS, ASYMPTOMATIC, BILATERAL: ICD-10-CM

## 2024-02-15 PROBLEM — I21.4 NSTEMI (NON-ST ELEVATED MYOCARDIAL INFARCTION): Status: ACTIVE | Noted: 2024-02-15

## 2024-02-15 PROCEDURE — 93005 ELECTROCARDIOGRAM TRACING: CPT

## 2024-02-15 PROCEDURE — 99214 OFFICE O/P EST MOD 30 MIN: CPT | Mod: S$GLB,,, | Performed by: NURSE PRACTITIONER

## 2024-02-15 PROCEDURE — 99999 PR PBB SHADOW E&M-EST. PATIENT-LVL IV: CPT | Mod: PBBFAC,,, | Performed by: NURSE PRACTITIONER

## 2024-02-15 PROCEDURE — 93010 ELECTROCARDIOGRAM REPORT: CPT | Mod: ,,, | Performed by: INTERNAL MEDICINE

## 2024-02-16 LAB
BACTERIA BLD CULT: NORMAL
BACTERIA BLD CULT: NORMAL

## 2024-02-19 DIAGNOSIS — Z00.00 ROUTINE ADULT HEALTH MAINTENANCE: Primary | ICD-10-CM

## 2024-02-20 ENCOUNTER — HOSPITAL ENCOUNTER (OUTPATIENT)
Dept: CARDIOLOGY | Facility: HOSPITAL | Age: 69
Discharge: HOME | End: 2024-02-20
Attending: INTERNAL MEDICINE
Payer: MEDICARE

## 2024-02-20 ENCOUNTER — OFFICE VISIT (OUTPATIENT)
Dept: CARDIOLOGY | Facility: CLINIC | Age: 69
End: 2024-02-20
Payer: MEDICARE

## 2024-02-20 VITALS
OXYGEN SATURATION: 94 % | SYSTOLIC BLOOD PRESSURE: 166 MMHG | BODY MASS INDEX: 40 KG/M2 | HEART RATE: 93 BPM | DIASTOLIC BLOOD PRESSURE: 118 MMHG | WEIGHT: 285.69 LBS | HEIGHT: 71 IN

## 2024-02-20 DIAGNOSIS — E11.65 TYPE 2 DIABETES MELLITUS WITH HYPERGLYCEMIA, WITHOUT LONG-TERM CURRENT USE OF INSULIN: ICD-10-CM

## 2024-02-20 DIAGNOSIS — I65.23 CAROTID STENOSIS, ASYMPTOMATIC, BILATERAL: ICD-10-CM

## 2024-02-20 DIAGNOSIS — Z00.00 ROUTINE ADULT HEALTH MAINTENANCE: ICD-10-CM

## 2024-02-20 DIAGNOSIS — I10 PRIMARY HYPERTENSION: ICD-10-CM

## 2024-02-20 DIAGNOSIS — E66.01 SEVERE OBESITY (BMI 35.0-39.9) WITH COMORBIDITY: ICD-10-CM

## 2024-02-20 DIAGNOSIS — I42.8 NONISCHEMIC CARDIOMYOPATHY: ICD-10-CM

## 2024-02-20 DIAGNOSIS — I50.42 CHRONIC COMBINED SYSTOLIC AND DIASTOLIC CONGESTIVE HEART FAILURE: Primary | ICD-10-CM

## 2024-02-20 LAB
OHS QRS DURATION: 102 MS
OHS QRS DURATION: 112 MS
OHS QTC CALCULATION: 467 MS
OHS QTC CALCULATION: 474 MS

## 2024-02-20 PROCEDURE — 99214 OFFICE O/P EST MOD 30 MIN: CPT | Mod: S$GLB,,, | Performed by: INTERNAL MEDICINE

## 2024-02-20 PROCEDURE — 93005 ELECTROCARDIOGRAM TRACING: CPT

## 2024-02-20 PROCEDURE — 93010 ELECTROCARDIOGRAM REPORT: CPT | Mod: ,,, | Performed by: INTERNAL MEDICINE

## 2024-02-20 PROCEDURE — 99999 PR PBB SHADOW E&M-EST. PATIENT-LVL III: CPT | Mod: PBBFAC,,, | Performed by: INTERNAL MEDICINE

## 2024-02-20 RX ORDER — CARVEDILOL 6.25 MG/1
6.25 TABLET ORAL 2 TIMES DAILY WITH MEALS
Qty: 60 TABLET | Refills: 11 | Status: SHIPPED | OUTPATIENT
Start: 2024-02-20 | End: 2024-03-05

## 2024-02-20 RX ORDER — SACUBITRIL AND VALSARTAN 24; 26 MG/1; MG/1
1 TABLET, FILM COATED ORAL 2 TIMES DAILY
Qty: 60 TABLET | Refills: 11 | Status: SHIPPED | OUTPATIENT
Start: 2024-02-20 | End: 2024-03-14

## 2024-02-20 NOTE — PROGRESS NOTES
Subjective:   Patient ID:  Ron Matthew is a 68 y.o. male who presents for evaluation of Shortness of Breath and Chest Pain      HPI  68-year-old with history of stroke and subarachnoid hemorrhage, with mild left residual symptoms.  Patient was admitted this month to the hospital with CHF new diagnosis in exacerbation.    His heart catheterization showed nonobstructive CAD.  He was over diuresed.  Presented a few days after to the hospital with hypotension and MAHAD.    His blood pressure has been adjusted after that.    However currently his blood pressure is running high at home.  He is only taking right now 12.5 mg metoprolol and Lasix.    While in the hospital his pressure was elevated.  He was on multiple blood pressure medications prior to his 1st hospitalization.  He has wearing a LifeVest.        Past Medical History:   Diagnosis Date    CAD (coronary artery disease)     Carotid artery dissection     CHF (congestive heart failure)     Gout, unspecified     HLD (hyperlipidemia)     HTN (hypertension)     Hyperparathyroidism, unspecified     JAMES (obstructive sleep apnea)     Prostate cancer     SAH (subarachnoid hemorrhage) 01/2023    Stroke 2020    Type 2 diabetes mellitus without complications        Past Surgical History:   Procedure Laterality Date    ANGIOGRAM, CAROTID      ANGIOGRAM, VERTEBRAL      CATHETERIZATION OF BOTH LEFT AND RIGHT HEART N/A 2/8/2024    Procedure: CATHETERIZATION, HEART, BOTH LEFT AND RIGHT;  Surgeon: Genie Claros MD;  Location: [facility] CATH LAB;  Service: Cardiology;  Laterality: N/A;    PARATHYROIDECTOMY         Social History     Tobacco Use    Smoking status: Never   Substance Use Topics    Alcohol use: Never    Drug use: Never       Family History   Problem Relation Age of Onset    Diabetes Mother     Hypertension Mother     Hypertension Father        Review of Systems   Cardiovascular:  Negative for chest pain, dyspnea on exertion, palpitations and syncope.    Genitourinary: Negative.    Neurological: Negative.        Current Outpatient Medications on File Prior to Visit   Medication Sig    allopurinoL (ZYLOPRIM) 100 MG tablet Take 100 mg by mouth daily as needed.    aspirin 81 mg Cap Take 81 mg by mouth once daily.    cholecalciferol, vitamin D3, (VITAMIN D3) 50 mcg (2,000 unit) Cap capsule Take 1 capsule by mouth once daily.    clopidogreL (PLAVIX) 75 mg tablet Take 1 tablet (75 mg total) by mouth once daily.    DOCOSAHEXAENOIC ACID ORAL Take 1 capsule by mouth once daily.    empagliflozin (JARDIANCE) 10 mg tablet Take 1 tablet (10 mg total) by mouth once daily.    furosemide (LASIX) 40 MG tablet Take 0.5 tablets (20 mg total) by mouth daily as needed (shortness of breath, leg swelling).    levothyroxine (SYNTHROID) 25 MCG tablet Take 1 tablet by mouth every morning.    magnesium oxide (MAG-OX) 250 mg magnesium Tab Take 1 tablet by mouth every morning.    metFORMIN (GLUCOPHAGE) 1000 MG tablet Take 1,000 mg by mouth 2 (two) times daily with meals.    [DISCONTINUED] metoprolol succinate (TOPROL-XL) 25 MG 24 hr tablet Take 0.5 tablets (12.5 mg total) by mouth once daily.     No current facility-administered medications on file prior to visit.       Objective:   Objective:  Wt Readings from Last 3 Encounters:   02/20/24 129.6 kg (285 lb 11.5 oz)   02/15/24 127 kg (279 lb 15.8 oz)   02/11/24 124.1 kg (273 lb 9.5 oz)     Temp Readings from Last 3 Encounters:   02/13/24 98.7 °F (37.1 °C)   02/10/24 98 °F (36.7 °C) (Oral)     BP Readings from Last 3 Encounters:   02/20/24 (!) 166/118   02/15/24 (!) 131/92   02/13/24 115/75     Pulse Readings from Last 3 Encounters:   02/20/24 93   02/15/24 85   02/13/24 82       Physical Exam  Vitals reviewed.   Constitutional:       Appearance: He is well-developed.   Neck:      Vascular: No carotid bruit.   Cardiovascular:      Rate and Rhythm: Normal rate and regular rhythm.      Pulses: Intact distal pulses.      Heart sounds: Normal  "heart sounds. No murmur heard.  Pulmonary:      Breath sounds: Normal breath sounds.   Neurological:      Mental Status: He is oriented to person, place, and time.         Lab Results   Component Value Date    CHOL 119 02/03/2023     Lab Results   Component Value Date    HDL 33 (L) 02/03/2023     Lab Results   Component Value Date    LDLCALC 70 02/03/2023     Lab Results   Component Value Date    TRIG 80 02/03/2023     No results found for: "CHOLHDL"    Chemistry        Component Value Date/Time     02/13/2024 0400    K 3.8 02/13/2024 0400    CL 98 02/13/2024 0400    CO2 30 (H) 02/13/2024 0400    BUN 28 (H) 02/13/2024 0400    CREATININE 1.3 02/13/2024 0400     (H) 02/13/2024 0400        Component Value Date/Time    CALCIUM 8.7 02/13/2024 0400    ALKPHOS 72 02/11/2024 0258    AST 28 02/11/2024 0258    ALT 29 02/11/2024 0258    BILITOT 2.0 (H) 02/11/2024 0258    ESTGFRAFRICA 100 02/03/2023 0519    ESTGFRAFRICA 98 08/05/2020 0828          No results found for: "TSH"  Lab Results   Component Value Date    INR 1.0 01/31/2023     Lab Results   Component Value Date    WBC 3.78 (L) 02/12/2024    HGB 17.4 02/12/2024    HCT 52.2 02/12/2024    MCV 88 02/12/2024     02/12/2024     BNP  @LABRCNTIP(BNP,BNPTRIAGEBLO)@  CrCl cannot be calculated (Patient's most recent lab result is older than the maximum 7 days allowed.).     Imaging:  ======    No results found for this or any previous visit.    No results found for this or any previous visit.    No results found for this or any previous visit.    No results found for this or any previous visit.    No valid procedures specified.    Results for orders placed during the hospital encounter of 02/06/24    Cardiac catheterization    Conclusion    The ejection fraction was calculated to be 25%.    The pre-procedure left ventricular end diastolic pressure was 29.    The post-procedure left ventricular end diastolic pressure was 33.    The estimated blood loss was " none.    There was non-obstructive coronary artery disease..    The filling pressures on the right and left were moderately elevated. Pulmonary hypertension was moderate.    Non ischemic dilated cardiomyopathy    The procedure log was documented by Documenter: Nahomi Reddy RN and verified by Genie Claros MD.    Date: 2/8/2024  Time: 5:21 PM      No results found for this or any previous visit.      Results for orders placed during the hospital encounter of 02/06/24    Echo    Interpretation Summary    Left Ventricle: The left ventricle is moderately dilated. Normal wall thickness. Severe global hypokinesis present. There is severely reduced systolic function with a visually estimated ejection fraction of 25 - 30%. Grade II diastolic dysfunction.    Right Ventricle: Normal right ventricular cavity size. Wall thickness is normal. Right ventricle wall motion  is normal. Systolic function is mildly reduced.    Left Atrium: Left atrium is moderately dilated.    Right Atrium: Right atrium is mildly dilated.    Aortic Valve: There is mild aortic regurgitation.    Mitral Valve: There is mild regurgitation.    Tricuspid Valve: There is mild regurgitation.    Pulmonary Artery: The estimated pulmonary artery systolic pressure is 47 mmHg.    IVC/SVC: Elevated venous pressure at 15 mmHg.      Diagnostic Results:  ECG: Reviewed    The ASCVD Risk score (Alistair DK, et al., 2019) failed to calculate for the following reasons:    The patient has a prior MI or stroke diagnosis        Assessment and Plan:   Chronic combined systolic and diastolic congestive heart failure  -     sacubitriL-valsartan (ENTRESTO) 24-26 mg per tablet; Take 1 tablet by mouth 2 (two) times daily.  Dispense: 60 tablet; Refill: 11  -     carvediloL (COREG) 6.25 MG tablet; Take 1 tablet (6.25 mg total) by mouth 2 (two) times daily with meals.  Dispense: 60 tablet; Refill: 11  -     Cancel: Echo; Future    Primary hypertension    Severe obesity (BMI  35.0-39.9) with comorbidity    Nonischemic cardiomyopathy    Type 2 diabetes mellitus with hyperglycemia, without long-term current use of insulin    Carotid stenosis, asymptomatic, bilateral      Blood pressure not at goal.  Currently hypertensive opposed to hypotensive after his 1st hospitalization.    We will start Entresto.  DC metoprolol and start carvedilol.    Needs a close follow-up in 2 weeks to readjust his medication.    We will need to do an echocardiogram was optimized after 3 months.  Explained if EF remains below 35% we will discuss defibrillator implant.  Reviewed all tests and above medical conditions with patient in detail and formulated treatment plan.  Risk factor modification discussed.   Cardiac low salt diet discussed.  Maintaining healthy weight and weight loss goals were discussed in clinic.    Follow up in  2 weeks

## 2024-02-23 ENCOUNTER — OFFICE VISIT (OUTPATIENT)
Dept: HOME HEALTH SERVICES | Facility: CLINIC | Age: 69
End: 2024-02-23
Payer: MEDICARE

## 2024-02-23 VITALS
DIASTOLIC BLOOD PRESSURE: 82 MMHG | RESPIRATION RATE: 18 BRPM | OXYGEN SATURATION: 97 % | HEART RATE: 78 BPM | SYSTOLIC BLOOD PRESSURE: 123 MMHG

## 2024-02-23 DIAGNOSIS — E11.65 TYPE 2 DIABETES MELLITUS WITH HYPERGLYCEMIA, WITHOUT LONG-TERM CURRENT USE OF INSULIN: ICD-10-CM

## 2024-02-23 DIAGNOSIS — Z95.810 USES LIFEVEST DEFIBRILLATOR: ICD-10-CM

## 2024-02-23 DIAGNOSIS — R55 NEAR SYNCOPE: ICD-10-CM

## 2024-02-23 DIAGNOSIS — I50.42 CHRONIC COMBINED SYSTOLIC AND DIASTOLIC CONGESTIVE HEART FAILURE: ICD-10-CM

## 2024-02-23 DIAGNOSIS — Z09 HOSPITAL DISCHARGE FOLLOW-UP: Primary | ICD-10-CM

## 2024-02-23 DIAGNOSIS — I10 PRIMARY HYPERTENSION: ICD-10-CM

## 2024-02-23 DIAGNOSIS — N17.9 AKI (ACUTE KIDNEY INJURY): ICD-10-CM

## 2024-02-23 PROCEDURE — 99344 HOME/RES VST NEW MOD MDM 60: CPT | Mod: ,,,

## 2024-02-23 NOTE — ASSESSMENT & PLAN NOTE
Euvolemic on exam  Lifevest in place  Followed up with heart failure clinic since discharge  CHF diet, 1.5 L fluid restriction  Weigh daily  Continue current medications  F/U with cardiology

## 2024-02-23 NOTE — PROGRESS NOTES
[facility]  Transitional Care Management (TCM) Home Visit    Encounter Provider: Tariq Wang   PCP: [facility]  Consult Requested By: Dr. Loretta Valadez  Admit Date: 2/11/24   IP Discharge Date: 2/13/24  Hospital Length of Stay:RRHLOS@ days  Days since discharge (from IP or SNF): 10   [facility] On Call Contact Note: 2/16  Hospital Diagnosis: MAHAD (acute kidney injury) [N17.9];Near syncope [R55];Chronic combined systolic and diastolic congestive heart failure [I50.42]     HISTORY OF PRESENT ILLNESS      Patient ID: Ron Matthew is a 68 y.o. male was recently admitted to the hospital, this is their TCM encounter.    Hospital Course Synopsis:   The patient is a 67 yo male with CAD, CHF-EF 25%, DM, HTN, HLD, Gout, JAMES, Prostate cancer, Stroke with right sided hemiparesis in 2020, Carotid dissection and SAH 1/2023 who presented to ED with near syncope. The patient was just discharged from hospital yesterday for CHF exacerbation. He was discharged on Lasix 40mg daily and Entresto (new Rx). The pt's wife states he took his night time Coreg at approx 2130 and went to bed. Ge got up to use the bathroom. After he had a BM, he was diaphoretic and unresponsive. When EMS arrived, pt was diaphoretic and lethargic with a GCS of 9. Pt also experienced 1 episode of vomiting PTA. Pt's symptoms have improved and he is back to his normal mental status.      In the ED, BP 78/56. Lab revealed Hct 57, mild MAHAD with sCr 1.6. BNP has improved from 1238>131. Troponin 0.090. EKG showed NSR with 1st degree Av block with TWI. CXR- clear       Hospital Course:   Cardiology consulted to assist with management. PT/OT rec low intensity therapy on discharge. Orthostatics +. Diuretics, entresto discontinued with improvement in BP. Systolics remained in low 100s, pt had improvement in fatigue/dizziness. Low dose toprol added after discussion with cardiology. Pt seen and examined on day of discharge and appears stable for  discharge home today with HH. Discussed with cardiology, will continue toprol 12.5, lasix as needed and close outpatient followup with cardiology for further medication management/adjustment. Discharge instruction and return precautions discussed at length with wife and patient, all questions answered to their satisfaction. Followup with PCP and cardiology within 1 week.     Doing well since discharge. Still with some weakness. Working with [facility] and PT/OT. Life Vest in place. He had follow up with cardiology, heart failure clinic, and urology since discharge. He has more follow ups in the next few weeks. His wife is present during visit. Euvolemic on exam. Reports some occasional dizziness when ambulating. Uses cane for long distances. Denies falls. Reports compliance with medications. Questions elicited. Time allowed for questions, all issues addressed. Contact info given for any concerns or changes.   DECISION MAKING TODAY       Assessment & Plan:  1. Hospital discharge follow-up    2. MAHAD (acute kidney injury)  Assessment & Plan:  Resolved  Had follow up with urology since discharge  Recent Cr 1.3, eGFR 60  Continue current medication    Orders:  -     Ambulatory referral/consult to [facility]    3. Near syncope  -     Ambulatory referral/consult to [facility]    4. Chronic combined systolic and diastolic congestive heart failure  Assessment & Plan:  Euvolemic on exam  Lifevest in place  Followed up with heart failure clinic since discharge  CHF diet, 1.5 L fluid restriction  Weigh daily  Continue current medications  F/U with cardiology      Orders:  -     Ambulatory referral/consult to [facility]    5. Type 2 diabetes mellitus with hyperglycemia, without long-term current use of insulin  Assessment & Plan:  Recent A1c 6.5   Continue metformin/jardiance        6. Primary hypertension  Assessment & Plan:  Stable during visit  Continue current  medications  Monitor bp  Continue   F/U with cardiology      7. Uses LifeVest defibrillator         Medication List on Discharge:     Medication List            Accurate as of February 23, 2024  4:11 PM. If you have any questions, ask your nurse or doctor.                CONTINUE taking these medications      allopurinoL 100 MG tablet  Commonly known as: ZYLOPRIM  Take 100 mg by mouth daily as needed.     aspirin 81 mg Cap  Take 81 mg by mouth once daily.     carvediloL 6.25 MG tablet  Commonly known as: COREG  Take 1 tablet (6.25 mg total) by mouth 2 (two) times daily with meals.     cholecalciferol (vitamin D3) 50 mcg (2,000 unit) Cap capsule  Commonly known as: VITAMIN D3  Take 1 capsule by mouth once daily.     clopidogreL 75 mg tablet  Commonly known as: PLAVIX  Take 1 tablet (75 mg total) by mouth once daily.     DOCOSAHEXAENOIC ACID ORAL  Take 1 capsule by mouth once daily.     empagliflozin 10 mg tablet  Commonly known as: JARDIANCE  Take 1 tablet (10 mg total) by mouth once daily.     ENTRESTO 24-26 mg per tablet  Generic drug: sacubitriL-valsartan  Take 1 tablet by mouth 2 (two) times daily.     furosemide 40 MG tablet  Commonly known as: LASIX  Take 0.5 tablets (20 mg total) by mouth daily as needed (shortness of breath, leg swelling).     levothyroxine 25 MCG tablet  Commonly known as: SYNTHROID  Take 1 tablet by mouth every morning.     magnesium oxide 250 mg magnesium Tab  Commonly known as: MAG-OX  Take 1 tablet by mouth every morning.     metFORMIN 1000 MG tablet  Commonly known as: GLUCOPHAGE  Take 1,000 mg by mouth 2 (two) times daily with meals.              Medication Reconciliation:  Were medications changed on discharge? Yes  Were medications in the home? Yes  Is the patient taking the medications as directed? Yes  Does the patient understand the medications and changes? Yes  Does updated med list accurately reflects meds patient is currently taking? Yes    ENVIRONMENT OF CARE      Family  and/or Caregiver present at visit?  Yes  Name of Caregiver: Supriya  History provided by: patient    Advance Care Planning   Advanced Care Planning Status:  Patient has had an ACP conversation  Living Will: No  Power of : No  [place]: No    Does Caregiver have HCPoA: No  Changes today: None  Is patient hospice appropriate: No  (If needed, use PPS <30 or FAST score >7)  Was referral to hospice placed: No     Impression upon entering the home:  Physical Dwelling: single family home   Appearance of home environment: cleaniness: clean, walking pathways: clear, lighting: adequate, and home structure: sound structure  Functional Status: independent  Mobility: ambulatory  Nutritional access: adequate intake and access  Home Health: Yes, HH Agency [facility] .    DME/Supplies: cane     Diagnostic tests reviewed/disposition: I have reviewed all completed as well as pending diagnostic tests at the time of discharge.  Disease/illness education: Diabetes and CHF  Establishment or re-establishment of referral orders for community resources: No other necessary community resources.   Discussion with other health care providers: No discussion with other health care providers necessary.   Does patient have a PCP at [facility]? No   Repatriation plan with PCP? follow-up with PCP within 90d   Does patient have an ostomy (ileostomy, colostomy, suprapubic catheter, nephrostomy tube, tracheostomy, PEG tube, pleurex catheter, cholecystostomy, etc)? No  Were BPAs reviewed? No    Social History     Socioeconomic History    Marital status:    Tobacco Use    Smoking status: Never   Substance and Sexual Activity    Alcohol use: Never    Drug use: Never       OBJECTIVE:     Vital Signs:  Vitals:    02/23/24 1219   BP: 123/82   Pulse: 78   Resp: 18       Review of Systems   Constitutional:  Positive for fatigue.   HENT: Negative.     Eyes: Negative.    Respiratory: Negative.  Negative for chest tightness.    Cardiovascular: Negative.  Negative  for leg swelling.   Gastrointestinal: Negative.    Endocrine: Negative.    Genitourinary: Negative.    Musculoskeletal: Negative.    Skin: Negative.    Allergic/Immunologic: Negative.    Neurological:  Positive for dizziness.   Hematological: Negative.    Psychiatric/Behavioral: Negative.  Negative for agitation.    All other systems reviewed and are negative.      Physical Exam:  Physical Exam  Vitals reviewed.   Constitutional:       General: He is not in acute distress.     Appearance: Normal appearance. He is well-developed.   HENT:      Head: Normocephalic and atraumatic.      Nose: Nose normal.      Mouth/Throat:      Mouth: Mucous membranes are dry.      Pharynx: Oropharynx is clear.   Eyes:      Pupils: Pupils are equal, round, and reactive to light.   Cardiovascular:      Rate and Rhythm: Normal rate and regular rhythm.      Pulses: Normal pulses.      Heart sounds: Normal heart sounds.   Pulmonary:      Effort: Pulmonary effort is normal.      Breath sounds: Normal breath sounds.   Abdominal:      General: Bowel sounds are normal.      Palpations: Abdomen is soft.   Musculoskeletal:         General: Normal range of motion.      Cervical back: Normal range of motion and neck supple.   Skin:     General: Skin is warm and dry.   Neurological:      General: No focal deficit present.      Mental Status: He is alert and oriented to person, place, and time. Mental status is at baseline.      Motor: Weakness present.   Psychiatric:         Mood and Affect: Mood normal.         Behavior: Behavior normal.         Thought Content: Thought content normal.         Judgment: Judgment normal.         INSTRUCTIONS FOR PATIENT:   - Continue all medications, treatments and therapies as ordered.   - Follow all instructions, recommendations as discussed.  - Maintain Safety Precautions at all times.  - Attend all medical appointments as scheduled.  - For worsening symptoms: call Primary Care Physician or Nurse Practitioner.  -  For emergencies, call 911 or immediately report to the nearest emergency room.   Scheduled Follow-up, Appts Reviewed with Modifications if Needed: Yes  Future Appointments   Date Time Provider Department Center   3/5/2024  9:00 AM Genie Claros MD ON CARDIO  [facility]       Signature: Tariq Wang NP    Transition of Care Visit:  I have reviewed and updated the history and problem list.  I have reconciled the medication list.  I have discussed the hospitalization and current medical issues, prognosis and plans with the patient/family.

## 2024-02-23 NOTE — ASSESSMENT & PLAN NOTE
Resolved  Had follow up with urology since discharge  Recent Cr 1.3, eGFR 60  Continue current medication

## 2024-03-05 ENCOUNTER — OFFICE VISIT (OUTPATIENT)
Dept: CARDIOLOGY | Facility: CLINIC | Age: 69
End: 2024-03-05
Payer: MEDICARE

## 2024-03-05 ENCOUNTER — LAB VISIT (OUTPATIENT)
Dept: LAB | Facility: HOSPITAL | Age: 69
End: 2024-03-05
Attending: INTERNAL MEDICINE
Payer: MEDICARE

## 2024-03-05 VITALS
DIASTOLIC BLOOD PRESSURE: 90 MMHG | SYSTOLIC BLOOD PRESSURE: 126 MMHG | HEART RATE: 84 BPM | HEIGHT: 71 IN | WEIGHT: 290.13 LBS | BODY MASS INDEX: 40.62 KG/M2

## 2024-03-05 DIAGNOSIS — E11.65 TYPE 2 DIABETES MELLITUS WITH HYPERGLYCEMIA, WITHOUT LONG-TERM CURRENT USE OF INSULIN: ICD-10-CM

## 2024-03-05 DIAGNOSIS — I10 PRIMARY HYPERTENSION: ICD-10-CM

## 2024-03-05 DIAGNOSIS — E66.01 SEVERE OBESITY (BMI 35.0-39.9) WITH COMORBIDITY: ICD-10-CM

## 2024-03-05 DIAGNOSIS — I10 HYPERTENSION, UNSPECIFIED TYPE: ICD-10-CM

## 2024-03-05 DIAGNOSIS — I42.8 NONISCHEMIC CARDIOMYOPATHY: ICD-10-CM

## 2024-03-05 DIAGNOSIS — M10.9 ACUTE GOUT INVOLVING TOE OF LEFT FOOT, UNSPECIFIED CAUSE: Primary | ICD-10-CM

## 2024-03-05 DIAGNOSIS — I50.42 CHRONIC COMBINED SYSTOLIC AND DIASTOLIC CONGESTIVE HEART FAILURE: ICD-10-CM

## 2024-03-05 DIAGNOSIS — M10.9 ACUTE GOUT INVOLVING TOE OF LEFT FOOT, UNSPECIFIED CAUSE: ICD-10-CM

## 2024-03-05 LAB — URATE SERPL-MCNC: 8.3 MG/DL (ref 3.4–7)

## 2024-03-05 PROCEDURE — 99999 PR PBB SHADOW E&M-EST. PATIENT-LVL III: CPT | Mod: PBBFAC,,, | Performed by: INTERNAL MEDICINE

## 2024-03-05 PROCEDURE — 36415 COLL VENOUS BLD VENIPUNCTURE: CPT | Performed by: INTERNAL MEDICINE

## 2024-03-05 PROCEDURE — 84550 ASSAY OF BLOOD/URIC ACID: CPT | Performed by: INTERNAL MEDICINE

## 2024-03-05 PROCEDURE — 99214 OFFICE O/P EST MOD 30 MIN: CPT | Mod: S$GLB,,, | Performed by: INTERNAL MEDICINE

## 2024-03-05 RX ORDER — ALLOPURINOL 100 MG/1
100 TABLET ORAL DAILY
Qty: 30 TABLET | Refills: 11 | Status: SHIPPED | OUTPATIENT
Start: 2024-03-05 | End: 2025-02-28

## 2024-03-05 RX ORDER — COLCHICINE 0.6 MG/1
0.6 TABLET ORAL DAILY
Qty: 7 TABLET | Refills: 0 | Status: SHIPPED | OUTPATIENT
Start: 2024-03-05 | End: 2024-03-14

## 2024-03-05 RX ORDER — CARVEDILOL 12.5 MG/1
12.5 TABLET ORAL 2 TIMES DAILY WITH MEALS
Qty: 60 TABLET | Refills: 11 | Status: SHIPPED | OUTPATIENT
Start: 2024-03-05 | End: 2024-03-28

## 2024-03-05 NOTE — PROGRESS NOTES
Subjective:   Patient ID:  Ron Matthew is a 68 y.o. male who presents for evaluation of No chief complaint on file.      HPI  3.5.2024  Here for 2 weeks for up to titrate his medication.  He had a left toe gout attack in the interim.  Started yesterday.    He states he only takes allopurinol p.r.n. but not on daily basis.    Wife also with the patient in the room.  They state that his pressure is still elevated especially diastolic was better systolic numbers since changes made last visit.    No syncope or palpitations.  No chest pain.  Breathing okay.  He is only taking the Lasix as needed.  He did gain 2-3 lb in the past day or 2 but he did not start taking his Lasix.  He does have bilateral lower extremity swelling 1+ edema.      2.20.2024  68-year-old with history of stroke and subarachnoid hemorrhage, with mild left residual symptoms.  Patient was admitted this month to the hospital with CHF new diagnosis in exacerbation.    His heart catheterization showed nonobstructive CAD.  He was over diuresed.  Presented a few days after to the hospital with hypotension and MAHAD.    His blood pressure has been adjusted after that.    However currently his blood pressure is running high at home.  He is only taking right now 12.5 mg metoprolol and Lasix.    While in the hospital his pressure was elevated.  He was on multiple blood pressure medications prior to his 1st hospitalization.  He has wearing a LifeVest.        Past Medical History:   Diagnosis Date    CAD (coronary artery disease)     Carotid artery dissection     CHF (congestive heart failure)     Gout, unspecified     HLD (hyperlipidemia)     HTN (hypertension)     Hyperparathyroidism, unspecified     JAMES (obstructive sleep apnea)     Prostate cancer     SAH (subarachnoid hemorrhage) 01/2023    Stroke 2020    Type 2 diabetes mellitus without complications        Past Surgical History:   Procedure Laterality Date    ANGIOGRAM, CAROTID      ANGIOGRAM,  VERTEBRAL      CATHETERIZATION OF BOTH LEFT AND RIGHT HEART N/A 2/8/2024    Procedure: CATHETERIZATION, HEART, BOTH LEFT AND RIGHT;  Surgeon: Genie Claros MD;  Location: [facility] CATH LAB;  Service: Cardiology;  Laterality: N/A;    PARATHYROIDECTOMY         Social History     Tobacco Use    Smoking status: Never   Substance Use Topics    Alcohol use: Never    Drug use: Never       Family History   Problem Relation Age of Onset    Diabetes Mother     Hypertension Mother     Hypertension Father        Review of Systems   Cardiovascular:  Negative for chest pain, dyspnea on exertion, palpitations and syncope.   Genitourinary: Negative.    Neurological: Negative.        Current Outpatient Medications on File Prior to Visit   Medication Sig    aspirin 81 mg Cap Take 81 mg by mouth once daily.    cholecalciferol, vitamin D3, (VITAMIN D3) 50 mcg (2,000 unit) Cap capsule Take 1 capsule by mouth once daily.    clopidogreL (PLAVIX) 75 mg tablet Take 1 tablet (75 mg total) by mouth once daily.    empagliflozin (JARDIANCE) 10 mg tablet Take 1 tablet (10 mg total) by mouth once daily.    furosemide (LASIX) 40 MG tablet Take 0.5 tablets (20 mg total) by mouth daily as needed (shortness of breath, leg swelling).    levothyroxine (SYNTHROID) 25 MCG tablet Take 1 tablet by mouth every morning.    magnesium oxide (MAG-OX) 250 mg magnesium Tab Take 1 tablet by mouth every morning.    metFORMIN (GLUCOPHAGE) 1000 MG tablet Take 1,000 mg by mouth 2 (two) times daily with meals.    sacubitriL-valsartan (ENTRESTO) 24-26 mg per tablet Take 1 tablet by mouth 2 (two) times daily.    [DISCONTINUED] carvediloL (COREG) 6.25 MG tablet Take 1 tablet (6.25 mg total) by mouth 2 (two) times daily with meals.    DOCOSAHEXAENOIC ACID ORAL Take 1 capsule by mouth once daily.    [DISCONTINUED] allopurinoL (ZYLOPRIM) 100 MG tablet Take 100 mg by mouth once daily.     No current facility-administered medications on file prior to visit.       Objective:  "  Objective:  Wt Readings from Last 3 Encounters:   03/05/24 131.6 kg (290 lb 2 oz)   02/20/24 129.6 kg (285 lb 11.5 oz)   02/15/24 127 kg (279 lb 15.8 oz)     Temp Readings from Last 3 Encounters:   02/13/24 98.7 °F (37.1 °C)   02/10/24 98 °F (36.7 °C) (Oral)     BP Readings from Last 3 Encounters:   03/05/24 (!) 126/90   02/23/24 123/82   02/20/24 (!) 166/118     Pulse Readings from Last 3 Encounters:   03/05/24 84   02/23/24 78   02/20/24 93       Physical Exam  Vitals reviewed.   Constitutional:       Appearance: He is well-developed.   Neck:      Vascular: No carotid bruit.   Cardiovascular:      Rate and Rhythm: Normal rate and regular rhythm.      Pulses: Intact distal pulses.      Heart sounds: Normal heart sounds. No murmur heard.  Pulmonary:      Breath sounds: Normal breath sounds.   Neurological:      Mental Status: He is oriented to person, place, and time.         Lab Results   Component Value Date    CHOL 119 02/03/2023     Lab Results   Component Value Date    HDL 33 (L) 02/03/2023     Lab Results   Component Value Date    LDLCALC 70 02/03/2023     Lab Results   Component Value Date    TRIG 80 02/03/2023     No results found for: "CHOLHDL"    Chemistry        Component Value Date/Time     02/13/2024 0400    K 3.8 02/13/2024 0400    CL 98 02/13/2024 0400    CO2 30 (H) 02/13/2024 0400    BUN 28 (H) 02/13/2024 0400    CREATININE 1.3 02/13/2024 0400     (H) 02/13/2024 0400        Component Value Date/Time    CALCIUM 8.7 02/13/2024 0400    ALKPHOS 72 02/11/2024 0258    AST 28 02/11/2024 0258    ALT 29 02/11/2024 0258    BILITOT 2.0 (H) 02/11/2024 0258    ESTGFRAFRICA 100 02/03/2023 0519    ESTGFRAFRICA 98 08/05/2020 0828          No results found for: "TSH"  Lab Results   Component Value Date    INR 1.0 01/31/2023     Lab Results   Component Value Date    WBC 3.78 (L) 02/12/2024    HGB 17.4 02/12/2024    HCT 52.2 02/12/2024    MCV 88 02/12/2024     02/12/2024 "     BNP  @LABRCNTIP(BNP,BNPTRIAGEBLO)@  CrCl cannot be calculated (Patient's most recent lab result is older than the maximum 7 days allowed.).     Imaging:  ======    No results found for this or any previous visit.    No results found for this or any previous visit.    No results found for this or any previous visit.    No results found for this or any previous visit.    No valid procedures specified.    Results for orders placed during the hospital encounter of 02/06/24    Cardiac catheterization    Conclusion    The ejection fraction was calculated to be 25%.    The pre-procedure left ventricular end diastolic pressure was 29.    The post-procedure left ventricular end diastolic pressure was 33.    The estimated blood loss was none.    There was non-obstructive coronary artery disease..    The filling pressures on the right and left were moderately elevated. Pulmonary hypertension was moderate.    Non ischemic dilated cardiomyopathy    The procedure log was documented by Documenter: Nahomi Reddy RN and verified by Genie Claros MD.    Date: 2/8/2024  Time: 5:21 PM      No results found for this or any previous visit.      Results for orders placed during the hospital encounter of 02/06/24    Echo    Interpretation Summary    Left Ventricle: The left ventricle is moderately dilated. Normal wall thickness. Severe global hypokinesis present. There is severely reduced systolic function with a visually estimated ejection fraction of 25 - 30%. Grade II diastolic dysfunction.    Right Ventricle: Normal right ventricular cavity size. Wall thickness is normal. Right ventricle wall motion  is normal. Systolic function is mildly reduced.    Left Atrium: Left atrium is moderately dilated.    Right Atrium: Right atrium is mildly dilated.    Aortic Valve: There is mild aortic regurgitation.    Mitral Valve: There is mild regurgitation.    Tricuspid Valve: There is mild regurgitation.    Pulmonary Artery: The  estimated pulmonary artery systolic pressure is 47 mmHg.    IVC/SVC: Elevated venous pressure at 15 mmHg.      Diagnostic Results:  ECG: Reviewed    The ASCVD Risk score (Alistair DK, et al., 2019) failed to calculate for the following reasons:    The patient has a prior MI or stroke diagnosis        Assessment and Plan:   Acute gout involving toe of left foot, unspecified cause  -     allopurinoL (ZYLOPRIM) 100 MG tablet; Take 1 tablet (100 mg total) by mouth once daily.  Dispense: 30 tablet; Refill: 11  -     colchicine (COLCRYS) 0.6 mg tablet; Take 1 tablet (0.6 mg total) by mouth once daily.  Dispense: 7 tablet; Refill: 0  -     URIC ACID; Future; Expected date: 03/05/2024    Chronic combined systolic and diastolic congestive heart failure  -     carvediloL (COREG) 12.5 MG tablet; Take 1 tablet (12.5 mg total) by mouth 2 (two) times daily with meals.  Dispense: 60 tablet; Refill: 11  -     Echo; Future; Expected date: 06/05/2024    Severe obesity (BMI 35.0-39.9) with comorbidity    Primary hypertension    Nonischemic cardiomyopathy    Type 2 diabetes mellitus with hyperglycemia, without long-term current use of insulin    Hypertension, unspecified type      Explained to change Lasix to daily instead of p.r.n..  We will increase carvedilol given blood pressure not at goal.    Continue with Entresto.    We will start him on allopurinol history of gout and hyperuricemia.  We prescribe colchicine for 1 week.  Follow with PCP  We will need to do an echocardiogram was optimized after 3 months.  Explained if EF remains below 35% we will discuss defibrillator implant.  Reviewed all tests and above medical conditions with patient in detail and formulated treatment plan.  Risk factor modification discussed.   Cardiac low salt diet discussed.  Maintaining healthy weight and weight loss goals were discussed in clinic.  Discussed possible CardioMEMS implant in the future  Follow up in  3 months after his echocardiogram

## 2024-03-12 ENCOUNTER — TELEPHONE (OUTPATIENT)
Dept: CARDIOLOGY | Facility: CLINIC | Age: 69
End: 2024-03-12
Payer: MEDICARE

## 2024-03-12 NOTE — TELEPHONE ENCOUNTER
Contacted patient; Patient states that he's gained 16 pounds in the last week and has been experiencing some edema around his ankles; Patient also states that his blood pressure had been running high.   Last readings   140/88  145/112  160/92  150/103 (today as I was on the phone)    Advised patient that I will let the provider know and also made an appointment for patient to come in Thursday.          ----- Message from Giorgi Garvey sent at 3/12/2024 11:03 AM CDT -----  Contact: [facility]  Ashlyn is requesting a call in regards to patient pressure. She stated that it still has been elevated even after the medication change. She is needing to know if patient is needing to be seen or needs a different medication. Please call pt back at [phone]. Thanks KB

## 2024-03-14 ENCOUNTER — OFFICE VISIT (OUTPATIENT)
Dept: CARDIOLOGY | Facility: CLINIC | Age: 69
End: 2024-03-14
Payer: MEDICARE

## 2024-03-14 VITALS
BODY MASS INDEX: 42.53 KG/M2 | HEIGHT: 71 IN | HEART RATE: 87 BPM | OXYGEN SATURATION: 99 % | WEIGHT: 303.81 LBS | SYSTOLIC BLOOD PRESSURE: 150 MMHG | DIASTOLIC BLOOD PRESSURE: 100 MMHG

## 2024-03-14 DIAGNOSIS — E66.01 SEVERE OBESITY (BMI 35.0-39.9) WITH COMORBIDITY: Primary | ICD-10-CM

## 2024-03-14 DIAGNOSIS — E11.65 TYPE 2 DIABETES MELLITUS WITH HYPERGLYCEMIA, WITHOUT LONG-TERM CURRENT USE OF INSULIN: ICD-10-CM

## 2024-03-14 DIAGNOSIS — I10 PRIMARY HYPERTENSION: ICD-10-CM

## 2024-03-14 DIAGNOSIS — I42.8 NONISCHEMIC CARDIOMYOPATHY: ICD-10-CM

## 2024-03-14 DIAGNOSIS — I65.23 CAROTID STENOSIS, ASYMPTOMATIC, BILATERAL: ICD-10-CM

## 2024-03-14 DIAGNOSIS — I50.42 CHRONIC COMBINED SYSTOLIC AND DIASTOLIC CONGESTIVE HEART FAILURE: ICD-10-CM

## 2024-03-14 PROCEDURE — 99999 PR PBB SHADOW E&M-EST. PATIENT-LVL III: CPT | Mod: PBBFAC,,, | Performed by: INTERNAL MEDICINE

## 2024-03-14 PROCEDURE — 99214 OFFICE O/P EST MOD 30 MIN: CPT | Mod: S$GLB,,, | Performed by: INTERNAL MEDICINE

## 2024-03-14 RX ORDER — SACUBITRIL AND VALSARTAN 49; 51 MG/1; MG/1
1 TABLET, FILM COATED ORAL 2 TIMES DAILY
Qty: 60 TABLET | Refills: 11 | OUTPATIENT
Start: 2024-03-14 | End: 2024-03-14

## 2024-03-14 RX ORDER — BUMETANIDE 1 MG/1
1 TABLET ORAL DAILY
Qty: 30 TABLET | Refills: 11 | Status: SHIPPED | OUTPATIENT
Start: 2024-03-14 | End: 2025-03-14

## 2024-03-14 RX ORDER — BUMETANIDE 1 MG/1
1 TABLET ORAL DAILY
Qty: 30 TABLET | Refills: 11 | OUTPATIENT
Start: 2024-03-14 | End: 2024-03-14

## 2024-03-14 RX ORDER — SACUBITRIL AND VALSARTAN 49; 51 MG/1; MG/1
1 TABLET, FILM COATED ORAL 2 TIMES DAILY
Qty: 60 TABLET | Refills: 11 | Status: SHIPPED | OUTPATIENT
Start: 2024-03-14

## 2024-03-14 RX ORDER — SACUBITRIL AND VALSARTAN 49; 51 MG/1; MG/1
1 TABLET, FILM COATED ORAL 2 TIMES DAILY
Qty: 60 TABLET | Refills: 11 | Status: SHIPPED | OUTPATIENT
Start: 2024-03-14 | End: 2024-03-14

## 2024-03-14 RX ORDER — BUMETANIDE 1 MG/1
1 TABLET ORAL DAILY
Qty: 30 TABLET | Refills: 11 | Status: SHIPPED | OUTPATIENT
Start: 2024-03-14 | End: 2024-03-14

## 2024-03-14 NOTE — PROGRESS NOTES
Subjective:   Patient ID:  Ron Matthew is a 68 y.o. male who presents for evaluation of No chief complaint on file.      HPI  3.14.2024   Comes in for 10 days follow-up.  He made appointment after he started filling up with fluid.   He has Lasix and though I have gave him instruction in the past to use Lasix daily he did not know to do that.    Now has 2+ pitting edema.  His pressure is being elevated.    Tolerating the Entresto.  States that with the initial addition of higher dose carvedilol his pressure was better but lately is elevated.      3.5.2024  Here for 2 weeks for up to titrate his medication.  He had a left toe gout attack in the interim.  Started yesterday.    He states he only takes allopurinol p.r.n. but not on daily basis.    Wife also with the patient in the room.  They state that his pressure is still elevated especially diastolic was better systolic numbers since changes made last visit.    No syncope or palpitations.  No chest pain.  Breathing okay.  He is only taking the Lasix as needed.  He did gain 2-3 lb in the past day or 2 but he did not start taking his Lasix.  He does have bilateral lower extremity swelling 1+ edema.      2.20.2024  68-year-old with history of stroke and subarachnoid hemorrhage, with mild left residual symptoms.  Patient was admitted this month to the hospital with CHF new diagnosis in exacerbation.    His heart catheterization showed nonobstructive CAD.  He was over diuresed.  Presented a few days after to the hospital with hypotension and MAHAD.    His blood pressure has been adjusted after that.    However currently his blood pressure is running high at home.  He is only taking right now 12.5 mg metoprolol and Lasix.    While in the hospital his pressure was elevated.  He was on multiple blood pressure medications prior to his 1st hospitalization.  He has wearing a LifeVest.        Past Medical History:   Diagnosis Date    CAD (coronary artery disease)      Carotid artery dissection     CHF (congestive heart failure)     Gout, unspecified     HLD (hyperlipidemia)     HTN (hypertension)     Hyperparathyroidism, unspecified     JAMES (obstructive sleep apnea)     Prostate cancer     SAH (subarachnoid hemorrhage) 01/2023    Stroke 2020    Type 2 diabetes mellitus without complications        Past Surgical History:   Procedure Laterality Date    ANGIOGRAM, CAROTID      ANGIOGRAM, VERTEBRAL      CATHETERIZATION OF BOTH LEFT AND RIGHT HEART N/A 2/8/2024    Procedure: CATHETERIZATION, HEART, BOTH LEFT AND RIGHT;  Surgeon: Genie Claros MD;  Location: [facility] CATH LAB;  Service: Cardiology;  Laterality: N/A;    PARATHYROIDECTOMY         Social History     Tobacco Use    Smoking status: Never   Substance Use Topics    Alcohol use: Never    Drug use: Never       Family History   Problem Relation Age of Onset    Diabetes Mother     Hypertension Mother     Hypertension Father        Review of Systems   Cardiovascular:  Positive for leg swelling. Negative for chest pain, dyspnea on exertion, palpitations and syncope.   Genitourinary: Negative.    Neurological: Negative.        Current Outpatient Medications on File Prior to Visit   Medication Sig    allopurinoL (ZYLOPRIM) 100 MG tablet Take 1 tablet (100 mg total) by mouth once daily.    aspirin 81 mg Cap Take 1 capsule by mouth every morning.    carvediloL (COREG) 12.5 MG tablet Take 1 tablet (12.5 mg total) by mouth 2 (two) times daily with meals.    cholecalciferol, vitamin D3, (VITAMIN D3) 50 mcg (2,000 unit) Cap capsule Take 1 capsule by mouth once daily.    clopidogreL (PLAVIX) 75 mg tablet Take 1 tablet (75 mg total) by mouth once daily.    DOCOSAHEXAENOIC ACID ORAL Take 1 capsule by mouth once daily.    empagliflozin (JARDIANCE) 10 mg tablet Take 1 tablet (10 mg total) by mouth once daily.    levothyroxine (SYNTHROID) 25 MCG tablet Take 1 tablet by mouth every morning.    magnesium oxide (MAG-OX) 250 mg magnesium Tab Take 1  "tablet by mouth every morning.    metFORMIN (GLUCOPHAGE) 1000 MG tablet Take 1,000 mg by mouth 2 (two) times daily with meals.    [DISCONTINUED] colchicine (COLCRYS) 0.6 mg tablet Take 1 tablet (0.6 mg total) by mouth once daily.    [DISCONTINUED] furosemide (LASIX) 40 MG tablet Take 0.5 tablets (20 mg total) by mouth daily as needed (shortness of breath, leg swelling).    [DISCONTINUED] sacubitriL-valsartan (ENTRESTO) 24-26 mg per tablet Take 1 tablet by mouth 2 (two) times daily.     No current facility-administered medications on file prior to visit.       Objective:   Objective:  Wt Readings from Last 3 Encounters:   03/14/24 (!) 137.8 kg (303 lb 12.7 oz)   03/05/24 131.6 kg (290 lb 2 oz)   02/20/24 129.6 kg (285 lb 11.5 oz)     Temp Readings from Last 3 Encounters:   02/13/24 98.7 °F (37.1 °C)   02/10/24 98 °F (36.7 °C) (Oral)     BP Readings from Last 3 Encounters:   03/14/24 (!) 150/100   03/05/24 (!) 126/90   02/23/24 123/82     Pulse Readings from Last 3 Encounters:   03/14/24 87   03/05/24 84   02/23/24 78       Physical Exam  Vitals reviewed.   Constitutional:       Appearance: He is well-developed.   Neck:      Vascular: No carotid bruit.   Cardiovascular:      Rate and Rhythm: Normal rate and regular rhythm.      Pulses: Intact distal pulses.      Heart sounds: Normal heart sounds. No murmur heard.  Pulmonary:      Breath sounds: Normal breath sounds.   Musculoskeletal:         General: Swelling present.   Neurological:      Mental Status: He is oriented to person, place, and time.         Lab Results   Component Value Date    CHOL 119 02/03/2023     Lab Results   Component Value Date    HDL 33 (L) 02/03/2023     Lab Results   Component Value Date    LDLCALC 70 02/03/2023     Lab Results   Component Value Date    TRIG 80 02/03/2023     No results found for: "CHOLHDL"    Chemistry        Component Value Date/Time     02/13/2024 0400    K 3.8 02/13/2024 0400    CL 98 02/13/2024 0400    CO2 30 (H) " "02/13/2024 0400    BUN 28 (H) 02/13/2024 0400    CREATININE 1.3 02/13/2024 0400     (H) 02/13/2024 0400        Component Value Date/Time    CALCIUM 8.7 02/13/2024 0400    ALKPHOS 72 02/11/2024 0258    AST 28 02/11/2024 0258    ALT 29 02/11/2024 0258    BILITOT 2.0 (H) 02/11/2024 0258    ESTGFRAFRICA 100 02/03/2023 0519    ESTGFRAFRICA 98 08/05/2020 0828          No results found for: "TSH"  Lab Results   Component Value Date    INR 1.0 01/31/2023     Lab Results   Component Value Date    WBC 3.78 (L) 02/12/2024    HGB 17.4 02/12/2024    HCT 52.2 02/12/2024    MCV 88 02/12/2024     02/12/2024     BNP  @LABRCNTIP(BNP,BNPTRIAGEBLO)@  CrCl cannot be calculated (Patient's most recent lab result is older than the maximum 7 days allowed.).     Imaging:  ======    No results found for this or any previous visit.    No results found for this or any previous visit.    No results found for this or any previous visit.    No results found for this or any previous visit.    No valid procedures specified.    Results for orders placed during the hospital encounter of 02/06/24    Cardiac catheterization    Conclusion    The ejection fraction was calculated to be 25%.    The pre-procedure left ventricular end diastolic pressure was 29.    The post-procedure left ventricular end diastolic pressure was 33.    The estimated blood loss was none.    There was non-obstructive coronary artery disease..    The filling pressures on the right and left were moderately elevated. Pulmonary hypertension was moderate.    Non ischemic dilated cardiomyopathy    The procedure log was documented by Documenter: Nahomi Reddy RN and verified by Genie Claros MD.    Date: 2/8/2024  Time: 5:21 PM      No results found for this or any previous visit.      Results for orders placed during the hospital encounter of 02/06/24    Echo    Interpretation Summary    Left Ventricle: The left ventricle is moderately dilated. Normal wall " thickness. Severe global hypokinesis present. There is severely reduced systolic function with a visually estimated ejection fraction of 25 - 30%. Grade II diastolic dysfunction.    Right Ventricle: Normal right ventricular cavity size. Wall thickness is normal. Right ventricle wall motion  is normal. Systolic function is mildly reduced.    Left Atrium: Left atrium is moderately dilated.    Right Atrium: Right atrium is mildly dilated.    Aortic Valve: There is mild aortic regurgitation.    Mitral Valve: There is mild regurgitation.    Tricuspid Valve: There is mild regurgitation.    Pulmonary Artery: The estimated pulmonary artery systolic pressure is 47 mmHg.    IVC/SVC: Elevated venous pressure at 15 mmHg.      Diagnostic Results:  ECG: Reviewed    The ASCVD Risk score (Alistair DK, et al., 2019) failed to calculate for the following reasons:    The patient has a prior MI or stroke diagnosis        Assessment and Plan:   Severe obesity (BMI 35.0-39.9) with comorbidity    Chronic combined systolic and diastolic congestive heart failure  -     Discontinue: sacubitriL-valsartan (ENTRESTO) 49-51 mg per tablet; Take 1 tablet by mouth 2 (two) times daily.  Dispense: 60 tablet; Refill: 11  -     Discontinue: bumetanide (BUMEX) 1 MG tablet; Take 1 tablet (1 mg total) by mouth once daily.  Dispense: 30 tablet; Refill: 11  -     Discontinue: sacubitriL-valsartan (ENTRESTO) 49-51 mg per tablet; Take 1 tablet by mouth 2 (two) times daily.  Dispense: 60 tablet; Refill: 11  -     Discontinue: bumetanide (BUMEX) 1 MG tablet; Take 1 tablet (1 mg total) by mouth once daily.  Dispense: 30 tablet; Refill: 11  -     Discontinue: bumetanide (BUMEX) 1 MG tablet; Take 1 tablet (1 mg total) by mouth once daily.  Dispense: 30 tablet; Refill: 11  -     Discontinue: sacubitriL-valsartan (ENTRESTO) 49-51 mg per tablet; Take 1 tablet by mouth 2 (two) times daily.  Dispense: 60 tablet; Refill: 11  -     bumetanide (BUMEX) 1 MG tablet; Take 1  tablet (1 mg total) by mouth once daily.  Dispense: 30 tablet; Refill: 11  -     sacubitriL-valsartan (ENTRESTO) 49-51 mg per tablet; Take 1 tablet by mouth 2 (two) times daily.  Dispense: 60 tablet; Refill: 11    Type 2 diabetes mellitus with hyperglycemia, without long-term current use of insulin    Nonischemic cardiomyopathy    Primary hypertension    Carotid stenosis, asymptomatic, bilateral      Explained again to take diuretics daily and not p.r.n. patient did not follow instructions last time.    We will increase his Entresto.  Change his Lasix to Bumex.    His gout attack resolved uric acid was 8.5.  Continue with allopurinol.  This he calls she is seen 7 days.  We will need to do an echocardiogram  once optimized after 3 months.  Explained if EF remains below 35% we will discuss defibrillator implant.  Reviewed all tests and above medical conditions with patient in detail and formulated treatment plan.  Risk factor modification discussed.   Cardiac low salt diet discussed.  Maintaining healthy weight and weight loss goals were discussed in clinic.    Discussed CardioMEMS implant again today.  Patient will think about it.  Follow up in 2 weeks to follow on his fluid status.

## 2024-03-28 ENCOUNTER — OFFICE VISIT (OUTPATIENT)
Dept: CARDIOLOGY | Facility: CLINIC | Age: 69
End: 2024-03-28
Payer: MEDICARE

## 2024-03-28 VITALS
BODY MASS INDEX: 40.9 KG/M2 | HEART RATE: 85 BPM | HEIGHT: 71 IN | WEIGHT: 292.13 LBS | DIASTOLIC BLOOD PRESSURE: 92 MMHG | SYSTOLIC BLOOD PRESSURE: 132 MMHG | OXYGEN SATURATION: 95 %

## 2024-03-28 DIAGNOSIS — I65.23 CAROTID STENOSIS, ASYMPTOMATIC, BILATERAL: ICD-10-CM

## 2024-03-28 DIAGNOSIS — I42.8 NONISCHEMIC CARDIOMYOPATHY: ICD-10-CM

## 2024-03-28 DIAGNOSIS — I10 HYPERTENSION, UNSPECIFIED TYPE: ICD-10-CM

## 2024-03-28 DIAGNOSIS — I50.42 CHRONIC COMBINED SYSTOLIC AND DIASTOLIC CONGESTIVE HEART FAILURE: ICD-10-CM

## 2024-03-28 DIAGNOSIS — Z78.9 STATIN INTOLERANCE: ICD-10-CM

## 2024-03-28 DIAGNOSIS — Z95.810 USES LIFEVEST DEFIBRILLATOR: ICD-10-CM

## 2024-03-28 DIAGNOSIS — E11.65 TYPE 2 DIABETES MELLITUS WITH HYPERGLYCEMIA, WITHOUT LONG-TERM CURRENT USE OF INSULIN: ICD-10-CM

## 2024-03-28 DIAGNOSIS — G47.33 OSA (OBSTRUCTIVE SLEEP APNEA): ICD-10-CM

## 2024-03-28 DIAGNOSIS — I10 PRIMARY HYPERTENSION: ICD-10-CM

## 2024-03-28 DIAGNOSIS — E66.01 SEVERE OBESITY (BMI 35.0-39.9) WITH COMORBIDITY: Primary | ICD-10-CM

## 2024-03-28 PROCEDURE — 99999 PR PBB SHADOW E&M-EST. PATIENT-LVL IV: CPT | Mod: PBBFAC,,, | Performed by: INTERNAL MEDICINE

## 2024-03-28 PROCEDURE — 99214 OFFICE O/P EST MOD 30 MIN: CPT | Mod: S$GLB,,, | Performed by: INTERNAL MEDICINE

## 2024-03-28 RX ORDER — CARVEDILOL 25 MG/1
25 TABLET ORAL 2 TIMES DAILY WITH MEALS
Qty: 60 TABLET | Refills: 11 | Status: SHIPPED | OUTPATIENT
Start: 2024-03-28 | End: 2025-03-28

## 2024-03-28 RX ORDER — CARVEDILOL 25 MG/1
25 TABLET ORAL 2 TIMES DAILY WITH MEALS
Qty: 60 TABLET | Refills: 11 | OUTPATIENT
Start: 2024-03-28 | End: 2024-03-28

## 2024-03-28 NOTE — PROGRESS NOTES
Subjective:   Patient ID:  Ron Matthew is a 68 y.o. male who presents for evaluation of No chief complaint on file.      HPI  3.28.2024  Comes in for 2 weeks follow-up.    Doing better with the Bumex.  He lost about 11 lb.    Blood pressure at times controlled at home but sometimes also elevated.    Compliant with his prescription.    No lower extremity swelling today.      3.14.2024   Comes in for 10 days follow-up.  He made appointment after he started filling up with fluid.   He has Lasix and though I have gave him instruction in the past to use Lasix daily he did not know to do that.    Now has 2+ pitting edema.  His pressure is being elevated.    Tolerating the Entresto.  States that with the initial addition of higher dose carvedilol his pressure was better but lately is elevated.      3.5.2024  Here for 2 weeks for up to titrate his medication.  He had a left toe gout attack in the interim.  Started yesterday.    He states he only takes allopurinol p.r.n. but not on daily basis.    Wife also with the patient in the room.  They state that his pressure is still elevated especially diastolic was better systolic numbers since changes made last visit.    No syncope or palpitations.  No chest pain.  Breathing okay.  He is only taking the Lasix as needed.  He did gain 2-3 lb in the past day or 2 but he did not start taking his Lasix.  He does have bilateral lower extremity swelling 1+ edema.      2.20.2024  68-year-old with history of stroke and subarachnoid hemorrhage, with mild left residual symptoms.  Patient was admitted this month to the hospital with CHF new diagnosis in exacerbation.    His heart catheterization showed nonobstructive CAD.  He was over diuresed.  Presented a few days after to the hospital with hypotension and MAHAD.    His blood pressure has been adjusted after that.    However currently his blood pressure is running high at home.  He is only taking right now 12.5 mg metoprolol and  Lasix.    While in the hospital his pressure was elevated.  He was on multiple blood pressure medications prior to his 1st hospitalization.  He has wearing a LifeVest.        Past Medical History:   Diagnosis Date    CAD (coronary artery disease)     Carotid artery dissection     CHF (congestive heart failure)     Gout, unspecified     HLD (hyperlipidemia)     HTN (hypertension)     Hyperparathyroidism, unspecified     JAMES (obstructive sleep apnea)     Prostate cancer     SAH (subarachnoid hemorrhage) 01/2023    Stroke 2020    Type 2 diabetes mellitus without complications        Past Surgical History:   Procedure Laterality Date    ANGIOGRAM, CAROTID      ANGIOGRAM, VERTEBRAL      CATHETERIZATION OF BOTH LEFT AND RIGHT HEART N/A 2/8/2024    Procedure: CATHETERIZATION, HEART, BOTH LEFT AND RIGHT;  Surgeon: Genie Claros MD;  Location: [facility] CATH LAB;  Service: Cardiology;  Laterality: N/A;    PARATHYROIDECTOMY         Social History     Tobacco Use    Smoking status: Never   Substance Use Topics    Alcohol use: Never    Drug use: Never       Family History   Problem Relation Age of Onset    Diabetes Mother     Hypertension Mother     Hypertension Father        Review of Systems   Cardiovascular:  Negative for chest pain, dyspnea on exertion, leg swelling, palpitations and syncope.   Genitourinary: Negative.    Neurological: Negative.        Current Outpatient Medications on File Prior to Visit   Medication Sig    allopurinoL (ZYLOPRIM) 100 MG tablet Take 1 tablet (100 mg total) by mouth once daily.    aspirin 81 mg Cap Take 1 capsule by mouth every morning.    bumetanide (BUMEX) 1 MG tablet Take 1 tablet (1 mg total) by mouth once daily.    cholecalciferol, vitamin D3, (VITAMIN D3) 50 mcg (2,000 unit) Cap capsule Take 1 capsule by mouth once daily.    clopidogreL (PLAVIX) 75 mg tablet Take 1 tablet (75 mg total) by mouth once daily.    DOCOSAHEXAENOIC ACID ORAL Take 1 capsule by mouth once daily.     "empagliflozin (JARDIANCE) 10 mg tablet Take 1 tablet (10 mg total) by mouth once daily.    levothyroxine (SYNTHROID) 25 MCG tablet Take 1 tablet by mouth every morning.    magnesium oxide (MAG-OX) 250 mg magnesium Tab Take 1 tablet by mouth every morning.    metFORMIN (GLUCOPHAGE) 1000 MG tablet Take 1,000 mg by mouth 2 (two) times daily with meals.    sacubitriL-valsartan (ENTRESTO) 49-51 mg per tablet Take 1 tablet by mouth 2 (two) times daily.    [DISCONTINUED] carvediloL (COREG) 12.5 MG tablet Take 1 tablet (12.5 mg total) by mouth 2 (two) times daily with meals.     No current facility-administered medications on file prior to visit.       Objective:   Objective:  Wt Readings from Last 3 Encounters:   03/28/24 132.5 kg (292 lb 1.8 oz)   03/14/24 (!) 137.8 kg (303 lb 12.7 oz)   03/05/24 131.6 kg (290 lb 2 oz)     Temp Readings from Last 3 Encounters:   02/13/24 98.7 °F (37.1 °C)   02/10/24 98 °F (36.7 °C) (Oral)     BP Readings from Last 3 Encounters:   03/28/24 (!) 132/92   03/14/24 (!) 150/100   03/05/24 (!) 126/90     Pulse Readings from Last 3 Encounters:   03/28/24 85   03/14/24 87   03/05/24 84       Physical Exam  Vitals reviewed.   Constitutional:       Appearance: He is well-developed.   Neck:      Vascular: No carotid bruit.   Cardiovascular:      Rate and Rhythm: Normal rate and regular rhythm.      Pulses: Intact distal pulses.      Heart sounds: Normal heart sounds. No murmur heard.  Pulmonary:      Breath sounds: Normal breath sounds.   Musculoskeletal:         General: No swelling.   Neurological:      Mental Status: He is oriented to person, place, and time.         Lab Results   Component Value Date    CHOL 119 02/03/2023     Lab Results   Component Value Date    HDL 33 (L) 02/03/2023     Lab Results   Component Value Date    LDLCALC 70 02/03/2023     Lab Results   Component Value Date    TRIG 80 02/03/2023     No results found for: "CHOLHDL"    Chemistry        Component Value Date/Time    NA " 137 02/13/2024 0400    K 3.8 02/13/2024 0400    CL 98 02/13/2024 0400    CO2 30 (H) 02/13/2024 0400    BUN 28 (H) 02/13/2024 0400    CREATININE 1.3 02/13/2024 0400     (H) 02/13/2024 0400        Component Value Date/Time    CALCIUM 8.7 02/13/2024 0400    ALKPHOS 72 02/11/2024 0258    AST 28 02/11/2024 0258    ALT 29 02/11/2024 0258    BILITOT 2.0 (H) 02/11/2024 0258    ESTGFRAFRICA 100 02/03/2023 0519    ESTGFRAFRICA 98 08/05/2020 0828          Lab Results   Component Value Date    TSH 1.440 03/15/2024     Lab Results   Component Value Date    INR 1.0 01/31/2023     Lab Results   Component Value Date    WBC 3.78 (L) 02/12/2024    HGB 17.4 02/12/2024    HCT 52.2 02/12/2024    MCV 88 02/12/2024     02/12/2024     BNP  @LABRCNTIP(BNP,BNPTRIAGEBLO)@  CrCl cannot be calculated (Patient's most recent lab result is older than the maximum 7 days allowed.).     Imaging:  ======    No results found for this or any previous visit.    No results found for this or any previous visit.    No results found for this or any previous visit.    No results found for this or any previous visit.    No valid procedures specified.    Results for orders placed during the hospital encounter of 02/06/24    Cardiac catheterization    Conclusion    The ejection fraction was calculated to be 25%.    The pre-procedure left ventricular end diastolic pressure was 29.    The post-procedure left ventricular end diastolic pressure was 33.    The estimated blood loss was none.    There was non-obstructive coronary artery disease..    The filling pressures on the right and left were moderately elevated. Pulmonary hypertension was moderate.    Non ischemic dilated cardiomyopathy    The procedure log was documented by Documenter: Nahomi Reddy RN and verified by Genie Claros MD.    Date: 2/8/2024  Time: 5:21 PM      No results found for this or any previous visit.      Results for orders placed during the hospital encounter of  02/06/24    Echo    Interpretation Summary    Left Ventricle: The left ventricle is moderately dilated. Normal wall thickness. Severe global hypokinesis present. There is severely reduced systolic function with a visually estimated ejection fraction of 25 - 30%. Grade II diastolic dysfunction.    Right Ventricle: Normal right ventricular cavity size. Wall thickness is normal. Right ventricle wall motion  is normal. Systolic function is mildly reduced.    Left Atrium: Left atrium is moderately dilated.    Right Atrium: Right atrium is mildly dilated.    Aortic Valve: There is mild aortic regurgitation.    Mitral Valve: There is mild regurgitation.    Tricuspid Valve: There is mild regurgitation.    Pulmonary Artery: The estimated pulmonary artery systolic pressure is 47 mmHg.    IVC/SVC: Elevated venous pressure at 15 mmHg.      Diagnostic Results:  ECG: Reviewed    The ASCVD Risk score (Alistair DK, et al., 2019) failed to calculate for the following reasons:    The patient has a prior MI or stroke diagnosis        Assessment and Plan:   Severe obesity (BMI 35.0-39.9) with comorbidity    Chronic combined systolic and diastolic congestive heart failure  -     Discontinue: carvediloL (COREG) 25 MG tablet; Take 1 tablet (25 mg total) by mouth 2 (two) times daily with meals.  Dispense: 60 tablet; Refill: 11  -     carvediloL (COREG) 25 MG tablet; Take 1 tablet (25 mg total) by mouth 2 (two) times daily with meals.  Dispense: 60 tablet; Refill: 11    Type 2 diabetes mellitus with hyperglycemia, without long-term current use of insulin    Carotid stenosis, asymptomatic, bilateral    Primary hypertension    Nonischemic cardiomyopathy    Hypertension, unspecified type    Uses LifeVest defibrillator    JAMES (obstructive sleep apnea)    Statin intolerance        Doing better today.  Continue with Bumex and Entresto.    We will increase his carvedilol.  We will need to do an echocardiogram  once optimized after 3 months.   Explained if EF remains below 35% we will discuss defibrillator implant.  We will move up his echo appointment.  Reviewed all tests and above medical conditions with patient in detail and formulated treatment plan.  Risk factor modification discussed.   Cardiac low salt diet discussed.  Maintaining healthy weight and weight loss goals were discussed in clinic.    Discussed CardioMEMS implant again today.  Patient still thinking about it  Follow up in 3 months.  He is agreeable with ICD if EF remains below 35%.

## 2024-05-13 PROBLEM — N17.9 AKI (ACUTE KIDNEY INJURY): Status: RESOLVED | Noted: 2024-02-11 | Resolved: 2024-05-13

## 2024-05-13 PROBLEM — I63.9 STROKE: Status: RESOLVED | Noted: 2024-02-06 | Resolved: 2024-05-13

## 2024-05-20 PROBLEM — I21.4 NSTEMI (NON-ST ELEVATED MYOCARDIAL INFARCTION): Status: RESOLVED | Noted: 2024-02-15 | Resolved: 2024-05-20

## 2024-06-10 ENCOUNTER — HOSPITAL ENCOUNTER (OUTPATIENT)
Dept: CARDIOLOGY | Facility: HOSPITAL | Age: 69
Discharge: HOME | End: 2024-06-10
Attending: INTERNAL MEDICINE
Payer: MEDICARE

## 2024-06-10 VITALS
WEIGHT: 292 LBS | HEIGHT: 71 IN | DIASTOLIC BLOOD PRESSURE: 92 MMHG | SYSTOLIC BLOOD PRESSURE: 132 MMHG | BODY MASS INDEX: 40.88 KG/M2

## 2024-06-10 DIAGNOSIS — I50.42 CHRONIC COMBINED SYSTOLIC AND DIASTOLIC CONGESTIVE HEART FAILURE: ICD-10-CM

## 2024-06-10 LAB
AORTIC ROOT ANNULUS: 3.47 CM
ASCENDING AORTA: 3.6 CM
AV INDEX (PROSTH): 0.76
AV MEAN GRADIENT: 2 MMHG
AV PEAK GRADIENT: 4 MMHG
AV VALVE AREA BY VELOCITY RATIO: 2.94 CM²
AV VALVE AREA: 3.08 CM²
AV VELOCITY RATIO: 0.73
BSA FOR ECHO PROCEDURE: 2.58 M2
CV ECHO LV RWT: 0.52 CM
DOP CALC AO PEAK VEL: 0.95 M/S
DOP CALC AO VTI: 18 CM
DOP CALC LVOT AREA: 4 CM2
DOP CALC LVOT DIAMETER: 2.27 CM
DOP CALC LVOT PEAK VEL: 0.69 M/S
DOP CALC LVOT STROKE VOLUME: 55.42 CM3
DOP CALC RVOT PEAK VEL: 0.51 M/S
DOP CALC RVOT VTI: 10 CM
DOP CALCLVOT PEAK VEL VTI: 13.7 CM
E WAVE DECELERATION TIME: 240.07 MSEC
E/A RATIO: 0.59
E/E' RATIO: 6.8 M/S
ECHO LV POSTERIOR WALL: 1.54 CM (ref 0.6–1.1)
EJECTION FRACTION: 25 %
FRACTIONAL SHORTENING: 10 % (ref 28–44)
INTERVENTRICULAR SEPTUM: 1.23 CM (ref 0.6–1.1)
IVC DIAMETER: 1.41 CM
IVRT: 165.56 MSEC
LA MAJOR: 6.28 CM
LA MINOR: 6.87 CM
LEFT ATRIUM SIZE: 5.83 CM
LEFT ATRIUM VOLUME INDEX MOD: 33.5 ML/M2
LEFT ATRIUM VOLUME MOD: 83.14 CM3
LEFT INTERNAL DIMENSION IN SYSTOLE: 5.33 CM (ref 2.1–4)
LEFT VENTRICLE DIASTOLIC VOLUME INDEX: 70.74 ML/M2
LEFT VENTRICLE DIASTOLIC VOLUME: 175.44 ML
LEFT VENTRICLE MASS INDEX: 151 G/M2
LEFT VENTRICLE SYSTOLIC VOLUME INDEX: 55.2 ML/M2
LEFT VENTRICLE SYSTOLIC VOLUME: 136.88 ML
LEFT VENTRICULAR INTERNAL DIMENSION IN DIASTOLE: 5.93 CM (ref 3.5–6)
LEFT VENTRICULAR MASS: 374.98 G
LV LATERAL E/E' RATIO: 4.86 M/S
LV SEPTAL E/E' RATIO: 11.33 M/S
LVOT MG: 1.07 MMHG
LVOT MV: 0.48 CM/S
MV PEAK A VEL: 0.58 M/S
MV PEAK E VEL: 0.34 M/S
MV STENOSIS PRESSURE HALF TIME: 69.62 MS
MV VALVE AREA P 1/2 METHOD: 3.16 CM2
OHS CV RV/LV RATIO: 0.75 CM
PISA TR MAX VEL: 1.91 M/S
PV MEAN GRADIENT: 1 MMHG
PV MV: 0.44 M/S
PV PEAK GRADIENT: 2 MMHG
PV PEAK S VEL: 0.35 M/S
PV PEAK VELOCITY: 0.64 M/S
RA MAJOR: 5.68 CM
RA PRESSURE ESTIMATED: 3 MMHG
RA WIDTH: 4.42 CM
RIGHT VENTRICULAR END-DIASTOLIC DIMENSION: 4.46 CM
RV TB RVSP: 5 MMHG
SINUS: 3.68 CM
STJ: 3.83 CM
TDI LATERAL: 0.07 M/S
TDI SEPTAL: 0.03 M/S
TDI: 0.05 M/S
TR MAX PG: 15 MMHG
TRICUSPID ANNULAR PLANE SYSTOLIC EXCURSION: 2.34 CM
TV REST PULMONARY ARTERY PRESSURE: 18 MMHG
Z-SCORE OF LEFT VENTRICULAR DIMENSION IN END DIASTOLE: -8.02
Z-SCORE OF LEFT VENTRICULAR DIMENSION IN END SYSTOLE: -3.07

## 2024-06-10 PROCEDURE — 93306 TTE W/DOPPLER COMPLETE: CPT | Mod: 26,,, | Performed by: INTERNAL MEDICINE

## 2024-06-10 PROCEDURE — 93306 TTE W/DOPPLER COMPLETE: CPT

## 2024-06-12 ENCOUNTER — TELEPHONE (OUTPATIENT)
Dept: CARDIOLOGY | Facility: CLINIC | Age: 69
End: 2024-06-12
Payer: MEDICARE

## 2024-06-12 NOTE — TELEPHONE ENCOUNTER
Contacted patient regarding results patient stated understanding and had no further questions or concerns. Patient asked to come in early for appointment on 6/20. Told patient they could come for 8:00 that morning so that he is able to make it to his other doctors appointment as well.    ----- Message from Genie Claros MD sent at 6/11/2024  4:04 PM CDT -----   Please make him  aware that I reviewed his echocardiogram results and I do recommend he needs a defibrillator like discussed in prior visits.    I would like him to keep his appointment for Thursday to discuss that.   And I will plan on doing his defibrillator on June 28th.  Thank you  ----- Message -----  From: Damir Head MD  Sent: 6/10/2024  12:10 PM CDT  To: Genie Claros MD

## 2024-06-20 ENCOUNTER — OFFICE VISIT (OUTPATIENT)
Dept: CARDIOLOGY | Facility: CLINIC | Age: 69
End: 2024-06-20
Payer: MEDICARE

## 2024-06-20 VITALS
OXYGEN SATURATION: 99 % | BODY MASS INDEX: 39.51 KG/M2 | DIASTOLIC BLOOD PRESSURE: 82 MMHG | SYSTOLIC BLOOD PRESSURE: 132 MMHG | WEIGHT: 282.19 LBS | HEART RATE: 76 BPM | HEIGHT: 71 IN

## 2024-06-20 DIAGNOSIS — E66.01 SEVERE OBESITY (BMI 35.0-39.9) WITH COMORBIDITY: ICD-10-CM

## 2024-06-20 DIAGNOSIS — I65.23 CAROTID STENOSIS, ASYMPTOMATIC, BILATERAL: ICD-10-CM

## 2024-06-20 DIAGNOSIS — G47.33 OSA (OBSTRUCTIVE SLEEP APNEA): ICD-10-CM

## 2024-06-20 DIAGNOSIS — I10 PRIMARY HYPERTENSION: ICD-10-CM

## 2024-06-20 DIAGNOSIS — Z78.9 STATIN INTOLERANCE: ICD-10-CM

## 2024-06-20 DIAGNOSIS — Z01.810 PREOPERATIVE CARDIOVASCULAR EXAMINATION: Primary | ICD-10-CM

## 2024-06-20 DIAGNOSIS — I10 HYPERTENSION, UNSPECIFIED TYPE: ICD-10-CM

## 2024-06-20 DIAGNOSIS — I42.8 NONISCHEMIC CARDIOMYOPATHY: ICD-10-CM

## 2024-06-20 DIAGNOSIS — I50.42 CHRONIC COMBINED SYSTOLIC AND DIASTOLIC CONGESTIVE HEART FAILURE: ICD-10-CM

## 2024-06-20 PROCEDURE — 4010F ACE/ARB THERAPY RXD/TAKEN: CPT | Mod: CPTII,S$GLB,, | Performed by: INTERNAL MEDICINE

## 2024-06-20 PROCEDURE — 99214 OFFICE O/P EST MOD 30 MIN: CPT | Mod: S$GLB,,, | Performed by: INTERNAL MEDICINE

## 2024-06-20 PROCEDURE — 1126F AMNT PAIN NOTED NONE PRSNT: CPT | Mod: CPTII,S$GLB,, | Performed by: INTERNAL MEDICINE

## 2024-06-20 PROCEDURE — 3075F SYST BP GE 130 - 139MM HG: CPT | Mod: CPTII,S$GLB,, | Performed by: INTERNAL MEDICINE

## 2024-06-20 PROCEDURE — 1101F PT FALLS ASSESS-DOCD LE1/YR: CPT | Mod: CPTII,S$GLB,, | Performed by: INTERNAL MEDICINE

## 2024-06-20 PROCEDURE — 3288F FALL RISK ASSESSMENT DOCD: CPT | Mod: CPTII,S$GLB,, | Performed by: INTERNAL MEDICINE

## 2024-06-20 PROCEDURE — 3044F HG A1C LEVEL LT 7.0%: CPT | Mod: CPTII,S$GLB,, | Performed by: INTERNAL MEDICINE

## 2024-06-20 PROCEDURE — 99999 PR PBB SHADOW E&M-EST. PATIENT-LVL II: CPT | Mod: PBBFAC,,, | Performed by: INTERNAL MEDICINE

## 2024-06-20 PROCEDURE — 3008F BODY MASS INDEX DOCD: CPT | Mod: CPTII,S$GLB,, | Performed by: INTERNAL MEDICINE

## 2024-06-20 PROCEDURE — 3079F DIAST BP 80-89 MM HG: CPT | Mod: CPTII,S$GLB,, | Performed by: INTERNAL MEDICINE

## 2024-06-20 NOTE — PROGRESS NOTES
Subjective:   Patient ID:  Ron Matthew is a 68 y.o. male who presents for evaluation of No chief complaint on file.      HPI  6.20.2024  Here to discuss echo findings an ICD implant  No improvement in his EF most recent is 25%.  On optimal medical treatment.    No dyspnea no lower extremity swelling.    Blood pressure controlled.      3.28.2024  Comes in for 2 weeks follow-up.    Doing better with the Bumex.  He lost about 11 lb.    Blood pressure at times controlled at home but sometimes also elevated.    Compliant with his prescription.    No lower extremity swelling today.      3.14.2024   Comes in for 10 days follow-up.  He made appointment after he started filling up with fluid.   He has Lasix and though I have gave him instruction in the past to use Lasix daily he did not know to do that.    Now has 2+ pitting edema.  His pressure is being elevated.    Tolerating the Entresto.  States that with the initial addition of higher dose carvedilol his pressure was better but lately is elevated.      3.5.2024  Here for 2 weeks for up to titrate his medication.  He had a left toe gout attack in the interim.  Started yesterday.    He states he only takes allopurinol p.r.n. but not on daily basis.    Wife also with the patient in the room.  They state that his pressure is still elevated especially diastolic was better systolic numbers since changes made last visit.    No syncope or palpitations.  No chest pain.  Breathing okay.  He is only taking the Lasix as needed.  He did gain 2-3 lb in the past day or 2 but he did not start taking his Lasix.  He does have bilateral lower extremity swelling 1+ edema.      2.20.2024  68-year-old with history of stroke and subarachnoid hemorrhage, with mild left residual symptoms.  Patient was admitted this month to the hospital with CHF new diagnosis in exacerbation.    His heart catheterization showed nonobstructive CAD.  He was over diuresed.  Presented a few days after  to the hospital with hypotension and MAHAD.    His blood pressure has been adjusted after that.    However currently his blood pressure is running high at home.  He is only taking right now 12.5 mg metoprolol and Lasix.    While in the hospital his pressure was elevated.  He was on multiple blood pressure medications prior to his 1st hospitalization.  He has wearing a LifeVest.        Past Medical History:   Diagnosis Date    CAD (coronary artery disease)     Carotid artery dissection     CHF (congestive heart failure)     Gout, unspecified     HLD (hyperlipidemia)     HTN (hypertension)     Hyperparathyroidism, unspecified     JAMES (obstructive sleep apnea)     Prostate cancer     SAH (subarachnoid hemorrhage) 01/2023    Stroke 2020    Type 2 diabetes mellitus without complications        Past Surgical History:   Procedure Laterality Date    ANGIOGRAM, CAROTID      ANGIOGRAM, VERTEBRAL      CATHETERIZATION OF BOTH LEFT AND RIGHT HEART N/A 2/8/2024    Procedure: CATHETERIZATION, HEART, BOTH LEFT AND RIGHT;  Surgeon: Genie Claros MD;  Location: [facility] CATH LAB;  Service: Cardiology;  Laterality: N/A;    PARATHYROIDECTOMY         Social History     Tobacco Use    Smoking status: Never   Substance Use Topics    Alcohol use: Never    Drug use: Never       Family History   Problem Relation Name Age of Onset    Diabetes Mother      Hypertension Mother      Hypertension Father         Review of Systems   Cardiovascular:  Negative for chest pain, dyspnea on exertion, leg swelling, palpitations and syncope.   Genitourinary: Negative.    Neurological: Negative.        Current Outpatient Medications on File Prior to Visit   Medication Sig    allopurinoL (ZYLOPRIM) 100 MG tablet Take 1 tablet (100 mg total) by mouth once daily.    aspirin 81 mg Cap Take 1 capsule by mouth every morning.    bumetanide (BUMEX) 1 MG tablet Take 1 tablet (1 mg total) by mouth once daily.    carvediloL (COREG) 25 MG tablet Take 1 tablet (25 mg  total) by mouth 2 (two) times daily with meals.    cholecalciferol, vitamin D3, (VITAMIN D3) 50 mcg (2,000 unit) Cap capsule Take 1 capsule by mouth once daily.    clopidogreL (PLAVIX) 75 mg tablet Take 1 tablet (75 mg total) by mouth once daily.    DOCOSAHEXAENOIC ACID ORAL Take 1 capsule by mouth once daily.    empagliflozin (JARDIANCE) 10 mg tablet Take 1 tablet (10 mg total) by mouth once daily.    levothyroxine (SYNTHROID) 25 MCG tablet Take 1 tablet by mouth every morning.    magnesium oxide (MAG-OX) 250 mg magnesium Tab Take 1 tablet by mouth every morning.    metFORMIN (GLUCOPHAGE) 1000 MG tablet Take 1,000 mg by mouth 2 (two) times daily with meals.    sacubitriL-valsartan (ENTRESTO) 49-51 mg per tablet Take 1 tablet by mouth 2 (two) times daily.     No current facility-administered medications on file prior to visit.       Objective:   Objective:  Wt Readings from Last 3 Encounters:   06/20/24 128 kg (282 lb 3 oz)   06/10/24 132.5 kg (292 lb)   03/28/24 132.5 kg (292 lb 1.8 oz)     Temp Readings from Last 3 Encounters:   02/13/24 98.7 °F (37.1 °C)   02/10/24 98 °F (36.7 °C) (Oral)     BP Readings from Last 3 Encounters:   06/20/24 132/82   06/10/24 (!) 132/92   03/28/24 (!) 132/92     Pulse Readings from Last 3 Encounters:   06/20/24 76   03/28/24 85   03/14/24 87       Physical Exam  Vitals reviewed.   Constitutional:       Appearance: He is well-developed.   Neck:      Vascular: No carotid bruit.   Cardiovascular:      Rate and Rhythm: Normal rate and regular rhythm.      Pulses: Intact distal pulses.      Heart sounds: Normal heart sounds. No murmur heard.  Pulmonary:      Breath sounds: Normal breath sounds.   Musculoskeletal:         General: No swelling.   Neurological:      Mental Status: He is oriented to person, place, and time.         Lab Results   Component Value Date    CHOL 119 02/03/2023     Lab Results   Component Value Date    HDL 33 (L) 02/03/2023     Lab Results   Component Value Date     "LDLCALC 70 02/03/2023     Lab Results   Component Value Date    TRIG 80 02/03/2023     No results found for: "CHOLHDL"    Chemistry        Component Value Date/Time     02/13/2024 0400    K 3.8 02/13/2024 0400    CL 98 02/13/2024 0400    CO2 30 (H) 02/13/2024 0400    BUN 28 (H) 02/13/2024 0400    CREATININE 1.3 02/13/2024 0400     (H) 02/13/2024 0400        Component Value Date/Time    CALCIUM 8.7 02/13/2024 0400    ALKPHOS 72 02/11/2024 0258    AST 28 02/11/2024 0258    ALT 29 02/11/2024 0258    BILITOT 2.0 (H) 02/11/2024 0258    ESTGFRAFRICA 100 02/03/2023 0519    ESTGFRAFRICA 98 08/05/2020 0828          Lab Results   Component Value Date    TSH 1.440 03/15/2024     Lab Results   Component Value Date    INR 1.0 01/31/2023     Lab Results   Component Value Date    WBC 3.78 (L) 02/12/2024    HGB 17.4 02/12/2024    HCT 52.2 02/12/2024    MCV 88 02/12/2024     02/12/2024     BNP  @LABRCNTIP(BNP,BNPTRIAGEBLO)@  CrCl cannot be calculated (Patient's most recent lab result is older than the maximum 7 days allowed.).     Imaging:  ======    No results found for this or any previous visit.    No results found for this or any previous visit.    No results found for this or any previous visit.    No results found for this or any previous visit.    No valid procedures specified.    Results for orders placed during the hospital encounter of 02/06/24    Cardiac catheterization    Conclusion    The ejection fraction was calculated to be 25%.    The pre-procedure left ventricular end diastolic pressure was 29.    The post-procedure left ventricular end diastolic pressure was 33.    The estimated blood loss was none.    There was non-obstructive coronary artery disease..    The filling pressures on the right and left were moderately elevated. Pulmonary hypertension was moderate.    Non ischemic dilated cardiomyopathy    The procedure log was documented by Documenter: Nahomi Reddy RN and verified by Genie" MD Harlan.    Date: 2/8/2024  Time: 5:21 PM      No results found for this or any previous visit.      Results for orders placed during the hospital encounter of 02/06/24    Echo    Interpretation Summary    Left Ventricle: The left ventricle is moderately dilated. Normal wall thickness. Severe global hypokinesis present. There is severely reduced systolic function with a visually estimated ejection fraction of 25 - 30%. Grade II diastolic dysfunction.    Right Ventricle: Normal right ventricular cavity size. Wall thickness is normal. Right ventricle wall motion  is normal. Systolic function is mildly reduced.    Left Atrium: Left atrium is moderately dilated.    Right Atrium: Right atrium is mildly dilated.    Aortic Valve: There is mild aortic regurgitation.    Mitral Valve: There is mild regurgitation.    Tricuspid Valve: There is mild regurgitation.    Pulmonary Artery: The estimated pulmonary artery systolic pressure is 47 mmHg.    IVC/SVC: Elevated venous pressure at 15 mmHg.      Diagnostic Results:  ECG: Reviewed    The ASCVD Risk score (Alistair FRAIRE, et al., 2019) failed to calculate for the following reasons:    The patient has a prior MI or stroke diagnosis        Assessment and Plan:   Preoperative cardiovascular examination  -     CBC Auto Differential; Future; Expected date: 06/20/2024  -     Basic metabolic panel; Future; Expected date: 06/20/2024  -     Protime-INR; Future; Expected date: 06/20/2024    Chronic combined systolic and diastolic congestive heart failure  -     Protime-INR; Future; Expected date: 06/20/2024    Carotid stenosis, asymptomatic, bilateral    Primary hypertension    Severe obesity (BMI 35.0-39.9) with comorbidity    Nonischemic cardiomyopathy    Hypertension, unspecified type    Statin intolerance    JAMES (obstructive sleep apnea)        Continue with carvedilol 25 mg b.i.d. Entresto and Bumex.    Euvolemic.    EF after optimal medical treatment of three-month still less than  35% most recently 25%.  Discussed risk and benefit of ICD implant for primary prevention and he was agreeable.  Explained alternatives.  We will plan on a DX single lead ICD implant  Reviewed all tests and above medical conditions with patient in detail and formulated treatment plan.  Risk factor modification discussed.   Cardiac low salt diet discussed.  Maintaining healthy weight and weight loss goals were discussed in clinic.    Discussed CardioMEMS in the past did not agree  Follow up after ICD implant

## 2024-06-20 NOTE — H&P (VIEW-ONLY)
Subjective:   Patient ID:  Ron Matthew is a 68 y.o. male who presents for evaluation of No chief complaint on file.      HPI  6.20.2024  Here to discuss echo findings an ICD implant  No improvement in his EF most recent is 25%.  On optimal medical treatment.    No dyspnea no lower extremity swelling.    Blood pressure controlled.      3.28.2024  Comes in for 2 weeks follow-up.    Doing better with the Bumex.  He lost about 11 lb.    Blood pressure at times controlled at home but sometimes also elevated.    Compliant with his prescription.    No lower extremity swelling today.      3.14.2024   Comes in for 10 days follow-up.  He made appointment after he started filling up with fluid.   He has Lasix and though I have gave him instruction in the past to use Lasix daily he did not know to do that.    Now has 2+ pitting edema.  His pressure is being elevated.    Tolerating the Entresto.  States that with the initial addition of higher dose carvedilol his pressure was better but lately is elevated.      3.5.2024  Here for 2 weeks for up to titrate his medication.  He had a left toe gout attack in the interim.  Started yesterday.    He states he only takes allopurinol p.r.n. but not on daily basis.    Wife also with the patient in the room.  They state that his pressure is still elevated especially diastolic was better systolic numbers since changes made last visit.    No syncope or palpitations.  No chest pain.  Breathing okay.  He is only taking the Lasix as needed.  He did gain 2-3 lb in the past day or 2 but he did not start taking his Lasix.  He does have bilateral lower extremity swelling 1+ edema.      2.20.2024  68-year-old with history of stroke and subarachnoid hemorrhage, with mild left residual symptoms.  Patient was admitted this month to the hospital with CHF new diagnosis in exacerbation.    His heart catheterization showed nonobstructive CAD.  He was over diuresed.  Presented a few days after  to the hospital with hypotension and MAHAD.    His blood pressure has been adjusted after that.    However currently his blood pressure is running high at home.  He is only taking right now 12.5 mg metoprolol and Lasix.    While in the hospital his pressure was elevated.  He was on multiple blood pressure medications prior to his 1st hospitalization.  He has wearing a LifeVest.        Past Medical History:   Diagnosis Date    CAD (coronary artery disease)     Carotid artery dissection     CHF (congestive heart failure)     Gout, unspecified     HLD (hyperlipidemia)     HTN (hypertension)     Hyperparathyroidism, unspecified     JAMES (obstructive sleep apnea)     Prostate cancer     SAH (subarachnoid hemorrhage) 01/2023    Stroke 2020    Type 2 diabetes mellitus without complications        Past Surgical History:   Procedure Laterality Date    ANGIOGRAM, CAROTID      ANGIOGRAM, VERTEBRAL      CATHETERIZATION OF BOTH LEFT AND RIGHT HEART N/A 2/8/2024    Procedure: CATHETERIZATION, HEART, BOTH LEFT AND RIGHT;  Surgeon: Genie Claros MD;  Location: [facility] CATH LAB;  Service: Cardiology;  Laterality: N/A;    PARATHYROIDECTOMY         Social History     Tobacco Use    Smoking status: Never   Substance Use Topics    Alcohol use: Never    Drug use: Never       Family History   Problem Relation Name Age of Onset    Diabetes Mother      Hypertension Mother      Hypertension Father         Review of Systems   Cardiovascular:  Negative for chest pain, dyspnea on exertion, leg swelling, palpitations and syncope.   Genitourinary: Negative.    Neurological: Negative.        Current Outpatient Medications on File Prior to Visit   Medication Sig    allopurinoL (ZYLOPRIM) 100 MG tablet Take 1 tablet (100 mg total) by mouth once daily.    aspirin 81 mg Cap Take 1 capsule by mouth every morning.    bumetanide (BUMEX) 1 MG tablet Take 1 tablet (1 mg total) by mouth once daily.    carvediloL (COREG) 25 MG tablet Take 1 tablet (25 mg  total) by mouth 2 (two) times daily with meals.    cholecalciferol, vitamin D3, (VITAMIN D3) 50 mcg (2,000 unit) Cap capsule Take 1 capsule by mouth once daily.    clopidogreL (PLAVIX) 75 mg tablet Take 1 tablet (75 mg total) by mouth once daily.    DOCOSAHEXAENOIC ACID ORAL Take 1 capsule by mouth once daily.    empagliflozin (JARDIANCE) 10 mg tablet Take 1 tablet (10 mg total) by mouth once daily.    levothyroxine (SYNTHROID) 25 MCG tablet Take 1 tablet by mouth every morning.    magnesium oxide (MAG-OX) 250 mg magnesium Tab Take 1 tablet by mouth every morning.    metFORMIN (GLUCOPHAGE) 1000 MG tablet Take 1,000 mg by mouth 2 (two) times daily with meals.    sacubitriL-valsartan (ENTRESTO) 49-51 mg per tablet Take 1 tablet by mouth 2 (two) times daily.     No current facility-administered medications on file prior to visit.       Objective:   Objective:  Wt Readings from Last 3 Encounters:   06/20/24 128 kg (282 lb 3 oz)   06/10/24 132.5 kg (292 lb)   03/28/24 132.5 kg (292 lb 1.8 oz)     Temp Readings from Last 3 Encounters:   02/13/24 98.7 °F (37.1 °C)   02/10/24 98 °F (36.7 °C) (Oral)     BP Readings from Last 3 Encounters:   06/20/24 132/82   06/10/24 (!) 132/92   03/28/24 (!) 132/92     Pulse Readings from Last 3 Encounters:   06/20/24 76   03/28/24 85   03/14/24 87       Physical Exam  Vitals reviewed.   Constitutional:       Appearance: He is well-developed.   Neck:      Vascular: No carotid bruit.   Cardiovascular:      Rate and Rhythm: Normal rate and regular rhythm.      Pulses: Intact distal pulses.      Heart sounds: Normal heart sounds. No murmur heard.  Pulmonary:      Breath sounds: Normal breath sounds.   Musculoskeletal:         General: No swelling.   Neurological:      Mental Status: He is oriented to person, place, and time.         Lab Results   Component Value Date    CHOL 119 02/03/2023     Lab Results   Component Value Date    HDL 33 (L) 02/03/2023     Lab Results   Component Value Date     "LDLCALC 70 02/03/2023     Lab Results   Component Value Date    TRIG 80 02/03/2023     No results found for: "CHOLHDL"    Chemistry        Component Value Date/Time     02/13/2024 0400    K 3.8 02/13/2024 0400    CL 98 02/13/2024 0400    CO2 30 (H) 02/13/2024 0400    BUN 28 (H) 02/13/2024 0400    CREATININE 1.3 02/13/2024 0400     (H) 02/13/2024 0400        Component Value Date/Time    CALCIUM 8.7 02/13/2024 0400    ALKPHOS 72 02/11/2024 0258    AST 28 02/11/2024 0258    ALT 29 02/11/2024 0258    BILITOT 2.0 (H) 02/11/2024 0258    ESTGFRAFRICA 100 02/03/2023 0519    ESTGFRAFRICA 98 08/05/2020 0828          Lab Results   Component Value Date    TSH 1.440 03/15/2024     Lab Results   Component Value Date    INR 1.0 01/31/2023     Lab Results   Component Value Date    WBC 3.78 (L) 02/12/2024    HGB 17.4 02/12/2024    HCT 52.2 02/12/2024    MCV 88 02/12/2024     02/12/2024     BNP  @LABRCNTIP(BNP,BNPTRIAGEBLO)@  CrCl cannot be calculated (Patient's most recent lab result is older than the maximum 7 days allowed.).     Imaging:  ======    No results found for this or any previous visit.    No results found for this or any previous visit.    No results found for this or any previous visit.    No results found for this or any previous visit.    No valid procedures specified.    Results for orders placed during the hospital encounter of 02/06/24    Cardiac catheterization    Conclusion    The ejection fraction was calculated to be 25%.    The pre-procedure left ventricular end diastolic pressure was 29.    The post-procedure left ventricular end diastolic pressure was 33.    The estimated blood loss was none.    There was non-obstructive coronary artery disease..    The filling pressures on the right and left were moderately elevated. Pulmonary hypertension was moderate.    Non ischemic dilated cardiomyopathy    The procedure log was documented by Documenter: Nahomi Reddy RN and verified by Genie" MD Harlan.    Date: 2/8/2024  Time: 5:21 PM      No results found for this or any previous visit.      Results for orders placed during the hospital encounter of 02/06/24    Echo    Interpretation Summary    Left Ventricle: The left ventricle is moderately dilated. Normal wall thickness. Severe global hypokinesis present. There is severely reduced systolic function with a visually estimated ejection fraction of 25 - 30%. Grade II diastolic dysfunction.    Right Ventricle: Normal right ventricular cavity size. Wall thickness is normal. Right ventricle wall motion  is normal. Systolic function is mildly reduced.    Left Atrium: Left atrium is moderately dilated.    Right Atrium: Right atrium is mildly dilated.    Aortic Valve: There is mild aortic regurgitation.    Mitral Valve: There is mild regurgitation.    Tricuspid Valve: There is mild regurgitation.    Pulmonary Artery: The estimated pulmonary artery systolic pressure is 47 mmHg.    IVC/SVC: Elevated venous pressure at 15 mmHg.      Diagnostic Results:  ECG: Reviewed    The ASCVD Risk score (Alistair FRAIRE, et al., 2019) failed to calculate for the following reasons:    The patient has a prior MI or stroke diagnosis        Assessment and Plan:   Preoperative cardiovascular examination  -     CBC Auto Differential; Future; Expected date: 06/20/2024  -     Basic metabolic panel; Future; Expected date: 06/20/2024  -     Protime-INR; Future; Expected date: 06/20/2024    Chronic combined systolic and diastolic congestive heart failure  -     Protime-INR; Future; Expected date: 06/20/2024    Carotid stenosis, asymptomatic, bilateral    Primary hypertension    Severe obesity (BMI 35.0-39.9) with comorbidity    Nonischemic cardiomyopathy    Hypertension, unspecified type    Statin intolerance    JAMES (obstructive sleep apnea)        Continue with carvedilol 25 mg b.i.d. Entresto and Bumex.    Euvolemic.    EF after optimal medical treatment of three-month still less than  35% most recently 25%.  Discussed risk and benefit of ICD implant for primary prevention and he was agreeable.  Explained alternatives.  We will plan on a DX single lead ICD implant  Reviewed all tests and above medical conditions with patient in detail and formulated treatment plan.  Risk factor modification discussed.   Cardiac low salt diet discussed.  Maintaining healthy weight and weight loss goals were discussed in clinic.    Discussed CardioMEMS in the past did not agree  Follow up after ICD implant

## 2024-06-21 ENCOUNTER — LAB VISIT (OUTPATIENT)
Dept: LAB | Facility: HOSPITAL | Age: 69
End: 2024-06-21
Attending: INTERNAL MEDICINE
Payer: MEDICARE

## 2024-06-21 DIAGNOSIS — Z01.810 PREOPERATIVE CARDIOVASCULAR EXAMINATION: ICD-10-CM

## 2024-06-21 DIAGNOSIS — I50.42 CHRONIC COMBINED SYSTOLIC AND DIASTOLIC CONGESTIVE HEART FAILURE: ICD-10-CM

## 2024-06-21 LAB
ANION GAP SERPL CALC-SCNC: 13 MMOL/L (ref 8–16)
BASOPHILS # BLD AUTO: 0.02 K/UL (ref 0–0.2)
BASOPHILS NFR BLD: 0.5 % (ref 0–1.9)
BUN SERPL-MCNC: 20 MG/DL (ref 8–23)
CALCIUM SERPL-MCNC: 9.6 MG/DL (ref 8.7–10.5)
CHLORIDE SERPL-SCNC: 100 MMOL/L (ref 95–110)
CO2 SERPL-SCNC: 25 MMOL/L (ref 23–29)
CREAT SERPL-MCNC: 1.2 MG/DL (ref 0.5–1.4)
DIFFERENTIAL METHOD BLD: ABNORMAL
EOSINOPHIL # BLD AUTO: 0.3 K/UL (ref 0–0.5)
EOSINOPHIL NFR BLD: 6.4 % (ref 0–8)
ERYTHROCYTE [DISTWIDTH] IN BLOOD BY AUTOMATED COUNT: 18.4 % (ref 11.5–14.5)
EST. GFR  (NO RACE VARIABLE): >60 ML/MIN/1.73 M^2
GLUCOSE SERPL-MCNC: 98 MG/DL (ref 70–110)
HCT VFR BLD AUTO: 50.4 % (ref 40–54)
HGB BLD-MCNC: 16.8 G/DL (ref 14–18)
IMM GRANULOCYTES # BLD AUTO: 0.01 K/UL (ref 0–0.04)
IMM GRANULOCYTES NFR BLD AUTO: 0.2 % (ref 0–0.5)
INR PPP: 1 (ref 0.8–1.2)
LYMPHOCYTES # BLD AUTO: 1.4 K/UL (ref 1–4.8)
LYMPHOCYTES NFR BLD: 31 % (ref 18–48)
MCH RBC QN AUTO: 31.3 PG (ref 27–31)
MCHC RBC AUTO-ENTMCNC: 33.3 G/DL (ref 32–36)
MCV RBC AUTO: 94 FL (ref 82–98)
MONOCYTES # BLD AUTO: 0.6 K/UL (ref 0.3–1)
MONOCYTES NFR BLD: 13.5 % (ref 4–15)
NEUTROPHILS # BLD AUTO: 2.1 K/UL (ref 1.8–7.7)
NEUTROPHILS NFR BLD: 48.4 % (ref 38–73)
NRBC BLD-RTO: 0 /100 WBC
PLATELET # BLD AUTO: 152 K/UL (ref 150–450)
PMV BLD AUTO: 12.9 FL (ref 9.2–12.9)
POTASSIUM SERPL-SCNC: 4.7 MMOL/L (ref 3.5–5.1)
PROTHROMBIN TIME: 11.3 SEC (ref 9–12.5)
RBC # BLD AUTO: 5.37 M/UL (ref 4.6–6.2)
SODIUM SERPL-SCNC: 138 MMOL/L (ref 136–145)
WBC # BLD AUTO: 4.36 K/UL (ref 3.9–12.7)

## 2024-06-21 PROCEDURE — 85610 PROTHROMBIN TIME: CPT | Performed by: INTERNAL MEDICINE

## 2024-06-21 PROCEDURE — 85025 COMPLETE CBC W/AUTO DIFF WBC: CPT | Performed by: INTERNAL MEDICINE

## 2024-06-21 PROCEDURE — 36415 COLL VENOUS BLD VENIPUNCTURE: CPT | Performed by: INTERNAL MEDICINE

## 2024-06-21 PROCEDURE — 80048 BASIC METABOLIC PNL TOTAL CA: CPT | Performed by: INTERNAL MEDICINE

## 2024-06-27 ENCOUNTER — NURSE TRIAGE (OUTPATIENT)
Dept: ADMINISTRATIVE | Facility: CLINIC | Age: 69
End: 2024-06-27
Payer: MEDICARE

## 2024-06-27 DIAGNOSIS — Z95.810 ICD (IMPLANTABLE CARDIOVERTER-DEFIBRILLATOR) IN PLACE: Primary | ICD-10-CM

## 2024-06-27 DIAGNOSIS — I50.42 CHRONIC COMBINED SYSTOLIC AND DIASTOLIC CHF (CONGESTIVE HEART FAILURE): ICD-10-CM

## 2024-06-27 DIAGNOSIS — I42.8 NONISCHEMIC CARDIOMYOPATHY: ICD-10-CM

## 2024-06-28 ENCOUNTER — HOSPITAL ENCOUNTER (OUTPATIENT)
Facility: HOSPITAL | Age: 69
Discharge: HOME | End: 2024-06-28
Attending: INTERNAL MEDICINE | Admitting: INTERNAL MEDICINE
Payer: MEDICARE

## 2024-06-28 VITALS
OXYGEN SATURATION: 98 % | WEIGHT: 270 LBS | HEIGHT: 71 IN | BODY MASS INDEX: 37.8 KG/M2 | DIASTOLIC BLOOD PRESSURE: 81 MMHG | RESPIRATION RATE: 19 BRPM | TEMPERATURE: 98 F | SYSTOLIC BLOOD PRESSURE: 135 MMHG | HEART RATE: 63 BPM

## 2024-06-28 DIAGNOSIS — I50.42 CHRONIC COMBINED SYSTOLIC AND DIASTOLIC CONGESTIVE HEART FAILURE: Primary | ICD-10-CM

## 2024-06-28 DIAGNOSIS — Z95.810 USES LIFEVEST DEFIBRILLATOR: ICD-10-CM

## 2024-06-28 DIAGNOSIS — R55 NEAR SYNCOPE: ICD-10-CM

## 2024-06-28 DIAGNOSIS — Q24.9 CARDIAC ABNORMALITY: ICD-10-CM

## 2024-06-28 DIAGNOSIS — I50.42 CHRONIC COMBINED SYSTOLIC AND DIASTOLIC CHF (CONGESTIVE HEART FAILURE): ICD-10-CM

## 2024-06-28 LAB — POCT GLUCOSE: 138 MG/DL (ref 70–110)

## 2024-06-28 PROCEDURE — 33249 INSJ/RPLCMT DEFIB W/LEAD(S): CPT | Performed by: INTERNAL MEDICINE

## 2024-06-28 PROCEDURE — 93005 ELECTROCARDIOGRAM TRACING: CPT

## 2024-06-28 PROCEDURE — C1895 LEAD, AICD, ENDO DUAL COIL: HCPCS | Performed by: INTERNAL MEDICINE

## 2024-06-28 PROCEDURE — 63600175 PHARM REV CODE 636 W HCPCS: Performed by: INTERNAL MEDICINE

## 2024-06-28 PROCEDURE — C1722 AICD, SINGLE CHAMBER: HCPCS | Performed by: INTERNAL MEDICINE

## 2024-06-28 PROCEDURE — 27201423 OPTIME MED/SURG SUP & DEVICES STERILE SUPPLY: Performed by: INTERNAL MEDICINE

## 2024-06-28 PROCEDURE — C1769 GUIDE WIRE: HCPCS | Performed by: INTERNAL MEDICINE

## 2024-06-28 PROCEDURE — 33249 INSJ/RPLCMT DEFIB W/LEAD(S): CPT | Mod: ,,, | Performed by: INTERNAL MEDICINE

## 2024-06-28 PROCEDURE — 93010 ELECTROCARDIOGRAM REPORT: CPT | Mod: ,,, | Performed by: STUDENT IN AN ORGANIZED HEALTH CARE EDUCATION/TRAINING PROGRAM

## 2024-06-28 PROCEDURE — C1894 INTRO/SHEATH, NON-LASER: HCPCS | Performed by: INTERNAL MEDICINE

## 2024-06-28 PROCEDURE — 25000003 PHARM REV CODE 250: Performed by: INTERNAL MEDICINE

## 2024-06-28 DEVICE — IMPLANTABLE DEVICE
Type: IMPLANTABLE DEVICE | Site: HEART | Status: FUNCTIONAL
Brand: PLEXA PROMRI

## 2024-06-28 DEVICE — IMPLANTABLE DEVICE
Type: IMPLANTABLE DEVICE | Site: CHEST | Status: FUNCTIONAL
Brand: ACTICOR 7 VR-T DX

## 2024-06-28 RX ORDER — MIDAZOLAM HYDROCHLORIDE 1 MG/ML
INJECTION, SOLUTION INTRAMUSCULAR; INTRAVENOUS
Status: DISCONTINUED | OUTPATIENT
Start: 2024-06-28 | End: 2024-06-28 | Stop reason: HOSPADM

## 2024-06-28 RX ORDER — LIDOCAINE HYDROCHLORIDE 20 MG/ML
INJECTION, SOLUTION INFILTRATION; PERINEURAL
Status: DISCONTINUED | OUTPATIENT
Start: 2024-06-28 | End: 2024-06-28 | Stop reason: HOSPADM

## 2024-06-28 RX ORDER — FENTANYL CITRATE 50 UG/ML
INJECTION, SOLUTION INTRAMUSCULAR; INTRAVENOUS
Status: DISCONTINUED | OUTPATIENT
Start: 2024-06-28 | End: 2024-06-28 | Stop reason: HOSPADM

## 2024-06-28 RX ORDER — CEFAZOLIN SODIUM 1 G/3ML
INJECTION, POWDER, FOR SOLUTION INTRAMUSCULAR; INTRAVENOUS
Status: DISCONTINUED | OUTPATIENT
Start: 2024-06-28 | End: 2024-06-28 | Stop reason: HOSPADM

## 2024-06-28 RX ORDER — VANCOMYCIN HYDROCHLORIDE 1 G/20ML
INJECTION, POWDER, LYOPHILIZED, FOR SOLUTION INTRAVENOUS
Status: DISCONTINUED | OUTPATIENT
Start: 2024-06-28 | End: 2024-06-28 | Stop reason: HOSPADM

## 2024-06-28 RX ORDER — CEPHALEXIN 500 MG/1
500 CAPSULE ORAL EVERY 12 HOURS
Qty: 10 CAPSULE | Refills: 0 | Status: SHIPPED | OUTPATIENT
Start: 2024-06-28

## 2024-06-28 RX ORDER — DIPHENHYDRAMINE HYDROCHLORIDE 50 MG/ML
INJECTION INTRAMUSCULAR; INTRAVENOUS
Status: DISCONTINUED | OUTPATIENT
Start: 2024-06-28 | End: 2024-06-28 | Stop reason: HOSPADM

## 2024-06-28 NOTE — PROGRESS NOTES
Discharge instructions rev'd w/ pt and spouse, BVU. Pt ambulatory to BR, PTW. VSS. LUCW ICD site WDL, Aquacel dressing CDI, no bleeding, no hematoma. Pharmacy delivered Keflex to BS, pt to begin tonight and finish Rx. Nozin and Hibiclens given. Ice pack to LUCW. IV removed. Pt wheeled to car.T

## 2024-06-28 NOTE — DISCHARGE SUMMARY
[facility] Lab (Hospital)  Cardiology  Discharge Summary      Patient Name: Ron Matthew  MRN: [id number]  Admission Date: 6/28/2024  Hospital Length of Stay: 0 days  Discharge Date and Time:  06/28/2024 1:53 PM  Attending Physician: Genie Claros MD    Discharging Provider: Genie Claros MD  Primary Care Physician: Administration, Cheri    HPI:   No notes on file    Procedure(s) (LRB):  Insertion, ICD, Dual Chamber/Biotronik (Left)     Indwelling Lines/Drains at time of discharge:  Lines/Drains/Airways       None                   Hospital Course:  No notes on file    Goals of Care Treatment Preferences:  Code Status: Full Code      Consults:     Significant Diagnostic Studies:     Successful implantation of ICD Dual placed for primary intervention. Single lead DX implant   Keflex for 5 days     Pending Diagnostic Studies:       Procedure Component Value Units Date/Time    X-Ray Chest AP Portable [[id number]]     Order Status: Sent Lab Status: No result             There are no hospital problems to display for this patient.    Assessment & plan notes cannot be loaded without a specified hospital service.      Discharged Condition: good    Disposition: Home or Self Care    Follow Up:    Patient Instructions:   No discharge procedures on file.  Medications:  Reconciled Home Medications:      Medication List        START taking these medications      cephALEXin 500 MG capsule  Commonly known as: KEFLEX  Take 1 capsule (500 mg total) by mouth every 12 (twelve) hours.            ASK your doctor about these medications      allopurinoL 100 MG tablet  Commonly known as: ZYLOPRIM  Take 1 tablet (100 mg total) by mouth once daily.     aspirin 81 mg Cap  Take 1 capsule by mouth every morning.     bumetanide 1 MG tablet  Commonly known as: BUMEX  Take 1 tablet (1 mg total) by mouth once daily.     carvediloL 25 MG tablet  Commonly known as: COREG  Take 1 tablet (25 mg total) by mouth 2 (two) times  daily with meals.     cholecalciferol (vitamin D3) 50 mcg (2,000 unit) Cap capsule  Commonly known as: VITAMIN D3  Take 1 capsule by mouth once daily.     clopidogreL 75 mg tablet  Commonly known as: PLAVIX  Take 1 tablet (75 mg total) by mouth once daily.     DOCOSAHEXAENOIC ACID ORAL  Take 1 capsule by mouth once daily.     empagliflozin 10 mg tablet  Commonly known as: JARDIANCE  Take 1 tablet (10 mg total) by mouth once daily.     ENTRESTO 49-51 mg per tablet  Generic drug: sacubitriL-valsartan  Take 1 tablet by mouth 2 (two) times daily.     levothyroxine 25 MCG tablet  Commonly known as: SYNTHROID  Take 1 tablet by mouth every morning.     magnesium oxide 250 mg magnesium Tab  Commonly known as: MAG-OX  Take 1 tablet by mouth every morning.     metFORMIN 1000 MG tablet  Commonly known as: GLUCOPHAGE  Take 1,000 mg by mouth 2 (two) times daily with meals.              Time spent on the discharge of patient: 30 minutes    Genie Claros MD  Cardiology  [facility] ([facility]

## 2024-06-28 NOTE — DISCHARGE INSTRUCTIONS
IMPLANTABLE DEFIBRILATOR INSTRUCTIONS    SAFETY  BECAUSE OF THE AFTEREFFECTS OF THE SEDATION YOU RECEIVED, WE ADVISE YOU TO REFRAIN FROM THE FOLLOWING ACTIVITIES FOR 24 HOURS.   1. DO NOT DRIVE OR OPERATE MACHINERY FOR 24 HOURS   2. DO NOT OPERATE APPLIANCES IF ALONE.     3.DON'T SIGN LEGAL PAPERS FOR 24 HOURS.     4. WE ADVISE THAT SOMEONE STAY WITH YOU AFTER THE PROCEDURE FOR AT LEAST 8 HOURS      DISCOMFORT: FOR GENERAL DISCOMFORT AT THE PUNCTURE, YOU MAY TAKE TYLENOL, 1-2 TABS EVERY 4-6 HOURS AS NEEDED.  DO NOT TAKE MORE THAN 4000 MG  IN 24 HOUR PERIOD.    ACTIVITY/ WOUND INSTRUCTIONS     AFFECTED ARM  DO NOT LIFT ARM ABOVE SHOULDER HEIGHT FOR 7 DAYS.                                  DO NOT  SWING ARM FOR 6 WEEKS                                DO NOT REMOVE DRESSING.  IT WILL BE REMOVED AT FOLLOW UP APPOINTMENT                                YOU MAY SHOWER IN 48 HOURS.  DO NOT REMOVE THE DRESSING FOR SHOWER. USE HIBICLENS                                        SOAP ON CHEST AND NECK AREA  FOR 1 WEEK.  FACE AWAY FROM SPRAY WHEN SHOWERING                                                                                REMOVE SLING FOR SHOWER, THEN REAPPLY AFTER SHOWER.                                USE ICE PACK FOR 48 HOURS .                                WEAR SLING AND SWATHE FOR 72 HOURS AROUND THE CLOCK THEN ONLY WHILE SLEEPING AT NIGHT FOR 1 MONTH             6. CALL YOUR HEALTHCARE PROVIDER IF YOU START TO HAVE THE FOLLOWING SYMPTOMS:                       1. High fever (101 degrees or higher)                 2. Redness,drainage or swelling  or intense pain at the incision site       3.  Drowsiness that doesn't get better       4. Weakness or dizziness that doesn't get better            5. Repeated vomiting                                                                                                                  NOZIN INSTRUCTIONS     GOAL: TO REDUCE THE RISK OF POST-PROCEDURAL INFECTIONS BY BACTERIA IN THE NASAL CAVITY.  THINK OF IT AS A HAND  FOR YOUR NOSE.       HOW TO USE:  1. SHAKE NOZIN BOTTLE WELL                            2. TAKE A COTTON SWAB AND APPLY FOUR DROPS TO TIP.                            3. INSERT COTTON SWAB INTO ONE NOSTRIL, BEING SURE NOT TO GO DEEPER INTO NOSE THAN THE TIP OF THE SWAB.                            4.SWAB NOSTRIL 6 TIMES CLOCKWISE AND 6 TIMES COUNERCLOCKWISE.  MAKE SURE TO SWAB THE INSIDE FRONT POCKET OF NOSTRIL.                             5. TAKE SWAB OUT AND APPLY 2 DROPS TO THE SAME COTTON TIP.  REPEAT STEPS 3 AND 4 IN THE OTHER NOSTRIL      DO STEPS 1-5 TWICE A DAY FOR 7 DAYS.

## 2024-06-28 NOTE — TELEPHONE ENCOUNTER
Pt sched for heart procedure tomorrow. Spouse calling in reporting she had 2 recent missed calls from an [facility] number but unsure from who. Advised caller per chart review there is not documentation of who called. Pt is aware of procedure and arrival time, and procedure is still sched. Caller has no further questions.        Reason for Disposition   Question about upcoming scheduled surgery, procedure or test, no triage required, and triager able to answer question    Protocols used: Information Only Call - No Triage-A-    
5

## 2024-06-29 LAB
OHS QRS DURATION: 112 MS
OHS QTC CALCULATION: 443 MS

## 2024-07-03 ENCOUNTER — TELEPHONE (OUTPATIENT)
Dept: CARDIOLOGY | Facility: CLINIC | Age: 69
End: 2024-07-03
Payer: MEDICARE

## 2024-07-03 ENCOUNTER — HOSPITAL ENCOUNTER (OUTPATIENT)
Dept: CARDIOLOGY | Facility: HOSPITAL | Age: 69
Discharge: HOME | End: 2024-07-03
Attending: INTERNAL MEDICINE
Payer: MEDICARE

## 2024-07-03 DIAGNOSIS — I42.8 NONISCHEMIC CARDIOMYOPATHY: ICD-10-CM

## 2024-07-03 DIAGNOSIS — I50.42 CHRONIC COMBINED SYSTOLIC AND DIASTOLIC CHF (CONGESTIVE HEART FAILURE): ICD-10-CM

## 2024-07-03 DIAGNOSIS — Z95.810 ICD (IMPLANTABLE CARDIOVERTER-DEFIBRILLATOR) IN PLACE: ICD-10-CM

## 2024-07-03 PROCEDURE — 93282 PRGRMG EVAL IMPLANTABLE DFB: CPT

## 2024-07-03 NOTE — TELEPHONE ENCOUNTER
Contacted patient's wife; She stated that patient is in and out of sleep and feeling weak and he's also having severe sweating. Advised patient to go to the ER; Patient stated that they'll come in first for the device check and if still feeling bad he'll go to the ER       Wife stated she's getting patient up to come into clinic now.     ----- Message from Roslyn Al sent at 7/3/2024  8:53 AM CDT -----  Regarding: concerns  Name of who is calling:   wife / Sandy      What is the request in detail: Requesting a call back in ref to having pacemaker in the other day and now sweating severely with stomach pain and needs to speak with your office asap / pt refused ER      Can the clinic reply by [facility]:yes      What number to call back if not [facility]: [phone]

## 2024-07-29 ENCOUNTER — CLINICAL SUPPORT (OUTPATIENT)
Dept: CARDIOLOGY | Facility: HOSPITAL | Age: 69
End: 2024-07-29
Payer: MEDICARE

## 2024-07-29 ENCOUNTER — CLINICAL SUPPORT (OUTPATIENT)
Dept: CARDIOLOGY | Facility: HOSPITAL | Age: 69
End: 2024-07-29
Attending: INTERNAL MEDICINE
Payer: MEDICARE

## 2024-07-29 DIAGNOSIS — Z95.810 PRESENCE OF AUTOMATIC (IMPLANTABLE) CARDIAC DEFIBRILLATOR: ICD-10-CM

## 2024-07-29 DIAGNOSIS — I50.42 CHRONIC COMBINED SYSTOLIC (CONGESTIVE) AND DIASTOLIC (CONGESTIVE) HEART FAILURE: ICD-10-CM

## 2024-07-29 PROCEDURE — 93296 REM INTERROG EVL PM/IDS: CPT | Performed by: INTERNAL MEDICINE

## 2024-07-29 PROCEDURE — 93295 DEV INTERROG REMOTE 1/2/MLT: CPT | Mod: S$GLB,,, | Performed by: INTERNAL MEDICINE

## 2024-08-13 LAB
OHS CV AF BURDEN PERCENT: < 1
OHS CV DC REMOTE DEVICE TYPE: NORMAL
OHS CV RV PACING PERCENT: 0 %

## 2024-10-08 ENCOUNTER — LAB VISIT (OUTPATIENT)
Dept: LAB | Facility: HOSPITAL | Age: 69
End: 2024-10-08
Attending: INTERNAL MEDICINE
Payer: OTHER GOVERNMENT

## 2024-10-08 ENCOUNTER — HOSPITAL ENCOUNTER (OUTPATIENT)
Dept: CARDIOLOGY | Facility: HOSPITAL | Age: 69
Discharge: HOME | End: 2024-10-08
Attending: INTERNAL MEDICINE
Payer: OTHER GOVERNMENT

## 2024-10-08 ENCOUNTER — OFFICE VISIT (OUTPATIENT)
Dept: CARDIOLOGY | Facility: CLINIC | Age: 69
End: 2024-10-08
Payer: OTHER GOVERNMENT

## 2024-10-08 VITALS
SYSTOLIC BLOOD PRESSURE: 128 MMHG | DIASTOLIC BLOOD PRESSURE: 81 MMHG | HEART RATE: 66 BPM | OXYGEN SATURATION: 97 % | BODY MASS INDEX: 40.59 KG/M2 | WEIGHT: 291 LBS

## 2024-10-08 DIAGNOSIS — I42.8 NONISCHEMIC CARDIOMYOPATHY: ICD-10-CM

## 2024-10-08 DIAGNOSIS — Z95.810 ICD (IMPLANTABLE CARDIOVERTER-DEFIBRILLATOR) IN PLACE: ICD-10-CM

## 2024-10-08 DIAGNOSIS — Z95.810 ICD (IMPLANTABLE CARDIOVERTER-DEFIBRILLATOR) IN PLACE: Primary | ICD-10-CM

## 2024-10-08 DIAGNOSIS — I65.23 CAROTID STENOSIS, ASYMPTOMATIC, BILATERAL: Primary | ICD-10-CM

## 2024-10-08 DIAGNOSIS — M1A.9XX0 CHRONIC GOUT WITHOUT TOPHUS, UNSPECIFIED CAUSE, UNSPECIFIED SITE: ICD-10-CM

## 2024-10-08 DIAGNOSIS — I10 HYPERTENSION, UNSPECIFIED TYPE: ICD-10-CM

## 2024-10-08 DIAGNOSIS — R55 NEAR SYNCOPE: ICD-10-CM

## 2024-10-08 DIAGNOSIS — I50.42 CHRONIC COMBINED SYSTOLIC AND DIASTOLIC CHF (CONGESTIVE HEART FAILURE): ICD-10-CM

## 2024-10-08 DIAGNOSIS — G47.33 OSA (OBSTRUCTIVE SLEEP APNEA): ICD-10-CM

## 2024-10-08 DIAGNOSIS — M10.9 ACUTE GOUT INVOLVING TOE OF LEFT FOOT, UNSPECIFIED CAUSE: ICD-10-CM

## 2024-10-08 DIAGNOSIS — I50.42 CHRONIC COMBINED SYSTOLIC AND DIASTOLIC CONGESTIVE HEART FAILURE: ICD-10-CM

## 2024-10-08 DIAGNOSIS — E66.01 MORBID OBESITY: ICD-10-CM

## 2024-10-08 LAB — URATE SERPL-MCNC: 10 MG/DL (ref 3.4–7)

## 2024-10-08 PROCEDURE — 99999 PR PBB SHADOW E&M-EST. PATIENT-LVL III: CPT | Mod: PBBFAC,,, | Performed by: INTERNAL MEDICINE

## 2024-10-08 PROCEDURE — 93282 PRGRMG EVAL IMPLANTABLE DFB: CPT

## 2024-10-08 PROCEDURE — 99214 OFFICE O/P EST MOD 30 MIN: CPT | Mod: S$PBB,,, | Performed by: INTERNAL MEDICINE

## 2024-10-08 PROCEDURE — 99213 OFFICE O/P EST LOW 20 MIN: CPT | Mod: PBBFAC,25 | Performed by: INTERNAL MEDICINE

## 2024-10-08 PROCEDURE — 84550 ASSAY OF BLOOD/URIC ACID: CPT | Performed by: INTERNAL MEDICINE

## 2024-10-08 PROCEDURE — 36415 COLL VENOUS BLD VENIPUNCTURE: CPT | Performed by: INTERNAL MEDICINE

## 2024-10-08 RX ORDER — CLOPIDOGREL BISULFATE 75 MG/1
75 TABLET ORAL DAILY
Qty: 90 TABLET | Refills: 0 | Status: SHIPPED | OUTPATIENT
Start: 2024-10-08 | End: 2024-10-08

## 2024-10-08 RX ORDER — CLOPIDOGREL BISULFATE 75 MG/1
75 TABLET ORAL DAILY
Qty: 90 TABLET | Refills: 3 | Status: SHIPPED | OUTPATIENT
Start: 2024-10-08 | End: 2025-10-03

## 2024-10-08 RX ORDER — ALLOPURINOL 100 MG/1
100 TABLET ORAL DAILY
Qty: 90 TABLET | Refills: 3 | Status: SHIPPED | OUTPATIENT
Start: 2024-10-08 | End: 2024-10-09 | Stop reason: SDUPTHER

## 2024-10-08 NOTE — PROGRESS NOTES
Subjective:   Patient ID:  Ron Matthew is a 69 y.o. male who presents for evaluation of Follow-up      Follow-up  Pertinent negatives include no chest pain.   10.8.2024  Comes in for a three-month follow-up.    He is doing well.    He gained weight some weight due to excessive calorie intake.    No lower extremity swelling orthopnea PND.    Denies palpitations.  ICD function normal.      6.20.2024  Here to discuss echo findings an ICD implant  No improvement in his EF most recent is 25%.  On optimal medical treatment.    No dyspnea no lower extremity swelling.    Blood pressure controlled.      3.28.2024  Comes in for 2 weeks follow-up.    Doing better with the Bumex.  He lost about 11 lb.    Blood pressure at times controlled at home but sometimes also elevated.    Compliant with his prescription.    No lower extremity swelling today.      3.14.2024   Comes in for 10 days follow-up.  He made appointment after he started filling up with fluid.   He has Lasix and though I have gave him instruction in the past to use Lasix daily he did not know to do that.    Now has 2+ pitting edema.  His pressure is being elevated.    Tolerating the Entresto.  States that with the initial addition of higher dose carvedilol his pressure was better but lately is elevated.      3.5.2024  Here for 2 weeks for up to titrate his medication.  He had a left toe gout attack in the interim.  Started yesterday.    He states he only takes allopurinol p.r.n. but not on daily basis.    Wife also with the patient in the room.  They state that his pressure is still elevated especially diastolic was better systolic numbers since changes made last visit.    No syncope or palpitations.  No chest pain.  Breathing okay.  He is only taking the Lasix as needed.  He did gain 2-3 lb in the past day or 2 but he did not start taking his Lasix.  He does have bilateral lower extremity swelling 1+ edema.      2.20.2024  68-year-old with history of  stroke and subarachnoid hemorrhage, with mild left residual symptoms.  Patient was admitted this month to the hospital with CHF new diagnosis in exacerbation.    His heart catheterization showed nonobstructive CAD.  He was over diuresed.  Presented a few days after to the hospital with hypotension and MAHAD.    His blood pressure has been adjusted after that.    However currently his blood pressure is running high at home.  He is only taking right now 12.5 mg metoprolol and Lasix.    While in the hospital his pressure was elevated.  He was on multiple blood pressure medications prior to his 1st hospitalization.  He has wearing a LifeVest.        Past Medical History:   Diagnosis Date    CAD (coronary artery disease)     Carotid artery dissection     CHF (congestive heart failure)     Gout, unspecified     HLD (hyperlipidemia)     HTN (hypertension)     Hyperparathyroidism, unspecified     JAMES (obstructive sleep apnea)     Prostate cancer     SAH (subarachnoid hemorrhage) 01/2023    Stroke 2020    Type 2 diabetes mellitus without complications        Past Surgical History:   Procedure Laterality Date    ANGIOGRAM, CAROTID      ANGIOGRAM, VERTEBRAL      CATHETERIZATION OF BOTH LEFT AND RIGHT HEART N/A 2/8/2024    Procedure: CATHETERIZATION, HEART, BOTH LEFT AND RIGHT;  Surgeon: Genie Claros MD;  Location: [facility] CATH LAB;  Service: Cardiology;  Laterality: N/A;    INSERTION, ICD, DUAL CHAMBER Left 6/28/2024    Procedure: Insertion, ICD, Dual Chamber/Biotronik;  Surgeon: Genie Claros MD;  Location: [facility] CATH LAB;  Service: Cardiology;  Laterality: Left;    PARATHYROIDECTOMY         Social History     Tobacco Use    Smoking status: Never    Smokeless tobacco: Never   Substance Use Topics    Alcohol use: Never    Drug use: Never       Family History   Problem Relation Name Age of Onset    Diabetes Mother      Hypertension Mother      Hypertension Father         Review of Systems   Cardiovascular:  Negative for  chest pain, dyspnea on exertion, leg swelling, palpitations and syncope.   Genitourinary: Negative.    Neurological: Negative.        Current Outpatient Medications on File Prior to Visit   Medication Sig    bumetanide (BUMEX) 1 MG tablet Take 1 tablet (1 mg total) by mouth once daily.    carvediloL (COREG) 25 MG tablet Take 1 tablet (25 mg total) by mouth 2 (two) times daily with meals.    cephALEXin (KEFLEX) 500 MG capsule Take 1 capsule (500 mg total) by mouth every 12 (twelve) hours.    cholecalciferol, vitamin D3, (VITAMIN D3) 50 mcg (2,000 unit) Cap capsule Take 1 capsule by mouth once daily.    DOCOSAHEXAENOIC ACID ORAL Take 1 capsule by mouth once daily.    empagliflozin (JARDIANCE) 10 mg tablet Take 1 tablet (10 mg total) by mouth once daily.    metFORMIN (GLUCOPHAGE) 1000 MG tablet Take 1,000 mg by mouth 2 (two) times daily with meals.    sacubitriL-valsartan (ENTRESTO) 49-51 mg per tablet Take 1 tablet by mouth 2 (two) times daily.    [DISCONTINUED] allopurinoL (ZYLOPRIM) 100 MG tablet Take 1 tablet (100 mg total) by mouth once daily.    [DISCONTINUED] aspirin 81 mg Cap Take 1 capsule by mouth every morning.    levothyroxine (SYNTHROID) 25 MCG tablet Take 1 tablet by mouth every morning. (Patient not taking: Reported on 10/8/2024)    magnesium oxide (MAG-OX) 250 mg magnesium Tab Take 1 tablet by mouth every morning. (Patient not taking: Reported on 10/8/2024)    [DISCONTINUED] clopidogreL (PLAVIX) 75 mg tablet Take 1 tablet (75 mg total) by mouth once daily.     No current facility-administered medications on file prior to visit.       Objective:   Objective:  Wt Readings from Last 3 Encounters:   10/08/24 132 kg (291 lb 0.1 oz)   06/28/24 122.5 kg (270 lb)   06/20/24 128 kg (282 lb 3 oz)     Temp Readings from Last 3 Encounters:   06/28/24 98.2 °F (36.8 °C) (Temporal)   02/13/24 98.7 °F (37.1 °C)   02/10/24 98 °F (36.7 °C) (Oral)     BP Readings from Last 3 Encounters:   10/08/24 128/81   06/28/24 135/81  "  06/20/24 132/82     Pulse Readings from Last 3 Encounters:   10/08/24 66   06/28/24 63   06/20/24 76       Physical Exam  Vitals reviewed.   Constitutional:       Appearance: He is well-developed.   Neck:      Vascular: No carotid bruit.   Cardiovascular:      Rate and Rhythm: Normal rate and regular rhythm.      Pulses: Intact distal pulses.      Heart sounds: Normal heart sounds. No murmur heard.  Pulmonary:      Breath sounds: Normal breath sounds.   Musculoskeletal:         General: No swelling.   Neurological:      Mental Status: He is oriented to person, place, and time.         Lab Results   Component Value Date    CHOL 119 02/03/2023     Lab Results   Component Value Date    HDL 33 (L) 02/03/2023     Lab Results   Component Value Date    LDLCALC 70 02/03/2023     Lab Results   Component Value Date    TRIG 80 02/03/2023     No results found for: "CHOLHDL"    Chemistry        Component Value Date/Time     06/21/2024 1049    K 4.7 06/21/2024 1049     06/21/2024 1049    CO2 25 06/21/2024 1049    BUN 20 06/21/2024 1049    CREATININE 1.2 06/21/2024 1049    GLU 98 06/21/2024 1049        Component Value Date/Time    CALCIUM 9.6 06/21/2024 1049    ALKPHOS 72 02/11/2024 0258    AST 28 02/11/2024 0258    ALT 29 02/11/2024 0258    BILITOT 2.0 (H) 02/11/2024 0258    ESTGFRAFRICA 100 02/03/2023 0519    ESTGFRAFRICA 98 08/05/2020 0828          Lab Results   Component Value Date    TSH 1.440 03/15/2024     Lab Results   Component Value Date    INR 1.0 06/21/2024    INR 1.0 01/31/2023     Lab Results   Component Value Date    WBC 4.36 06/21/2024    HGB 16.8 06/21/2024    HCT 50.4 06/21/2024    MCV 94 06/21/2024     06/21/2024     BNP  @LABRCNTIP(BNP,BNPTRIAGEBLO)@  CrCl cannot be calculated (Patient's most recent lab result is older than the maximum 7 days allowed.).     Imaging:  ======    No results found for this or any previous visit.    No results found for this or any previous visit.    No results " found for this or any previous visit.    No results found for this or any previous visit.    No valid procedures specified.    Results for orders placed during the hospital encounter of 02/06/24    Cardiac catheterization    Conclusion    The ejection fraction was calculated to be 25%.    The pre-procedure left ventricular end diastolic pressure was 29.    The post-procedure left ventricular end diastolic pressure was 33.    The estimated blood loss was none.    There was non-obstructive coronary artery disease..    The filling pressures on the right and left were moderately elevated. Pulmonary hypertension was moderate.    Non ischemic dilated cardiomyopathy    The procedure log was documented by Documenter: Nahomi Reddy RN and verified by Genie Claros MD.    Date: 2/8/2024  Time: 5:21 PM      No results found for this or any previous visit.      Results for orders placed during the hospital encounter of 02/06/24    Echo    Interpretation Summary    Left Ventricle: The left ventricle is moderately dilated. Normal wall thickness. Severe global hypokinesis present. There is severely reduced systolic function with a visually estimated ejection fraction of 25 - 30%. Grade II diastolic dysfunction.    Right Ventricle: Normal right ventricular cavity size. Wall thickness is normal. Right ventricle wall motion  is normal. Systolic function is mildly reduced.    Left Atrium: Left atrium is moderately dilated.    Right Atrium: Right atrium is mildly dilated.    Aortic Valve: There is mild aortic regurgitation.    Mitral Valve: There is mild regurgitation.    Tricuspid Valve: There is mild regurgitation.    Pulmonary Artery: The estimated pulmonary artery systolic pressure is 47 mmHg.    IVC/SVC: Elevated venous pressure at 15 mmHg.      Diagnostic Results:  ECG: Reviewed    The ASCVD Risk score (Alistair FRAIRE, et al., 2019) failed to calculate for the following reasons:    Risk score cannot be calculated because  patient has a medical history suggesting prior/existing ASCVD        Assessment and Plan:   Carotid stenosis, asymptomatic, bilateral  -     Discontinue: clopidogreL (PLAVIX) 75 mg tablet; Take 1 tablet (75 mg total) by mouth once daily.  Dispense: 90 tablet; Refill: 0  -     clopidogreL (PLAVIX) 75 mg tablet; Take 1 tablet (75 mg total) by mouth once daily.  Dispense: 90 tablet; Refill: 3    Chronic gout  -     allopurinoL (ZYLOPRIM) 100 MG tablet; Take 1 tablet (100 mg total) by mouth once daily.  Dispense: 90 tablet; Refill: 3    Chronic gout without tophus, unspecified cause, unspecified site  -     URIC ACID; Future; Expected date: 10/08/2024    Chronic combined systolic and diastolic CHF (congestive heart failure)    Nonischemic cardiomyopathy    ICD (implantable cardioverter-defibrillator) in place    Hypertension, unspecified type    JAMES (obstructive sleep apnea)    Morbid obesity    Angina free hypertension controlled  Continue with carvedilol 25 mg b.i.d. Entresto and Bumex.    Euvolemic.    Status post ICD implant June 2024  On GDM T  Reviewed all tests and above medical conditions with patient in detail and formulated treatment plan.  Risk factor modification discussed.   Cardiac low salt diet discussed.  Maintaining healthy weight and weight loss goals were discussed in clinic.    Follow up in six-month

## 2024-10-09 ENCOUNTER — TELEPHONE (OUTPATIENT)
Dept: CARDIOLOGY | Facility: CLINIC | Age: 69
End: 2024-10-09
Payer: MEDICARE

## 2024-10-09 DIAGNOSIS — M10.9 ACUTE GOUT INVOLVING TOE OF LEFT FOOT, UNSPECIFIED CAUSE: ICD-10-CM

## 2024-10-09 RX ORDER — ALLOPURINOL 300 MG/1
300 TABLET ORAL DAILY
Qty: 90 TABLET | Refills: 3 | Status: SHIPPED | OUTPATIENT
Start: 2024-10-09 | End: 2025-10-04

## 2024-10-09 NOTE — PROGRESS NOTES
Please make him aware his uric acid is higher than before.    I will increase his allopurinol dose.  Also to decrease meat intake.    Dr. Claros

## 2024-10-09 NOTE — TELEPHONE ENCOUNTER
Attempted to contact patient regarding results could not lvm due to vm box being full.    ----- Message from Genie Claros MD sent at 10/9/2024  8:35 AM CDT -----  Please make him aware his uric acid is higher than before.    I will increase his allopurinol dose.  Also to decrease meat intake.    Dr. Claros

## 2024-10-29 ENCOUNTER — CLINICAL SUPPORT (OUTPATIENT)
Dept: CARDIOLOGY | Facility: HOSPITAL | Age: 69
End: 2024-10-29
Attending: INTERNAL MEDICINE
Payer: MEDICARE

## 2024-10-29 ENCOUNTER — CLINICAL SUPPORT (OUTPATIENT)
Dept: CARDIOLOGY | Facility: HOSPITAL | Age: 69
End: 2024-10-29
Payer: MEDICARE

## 2024-10-29 DIAGNOSIS — Z95.810 PRESENCE OF AUTOMATIC (IMPLANTABLE) CARDIAC DEFIBRILLATOR: ICD-10-CM

## 2024-10-29 DIAGNOSIS — I50.42 CHRONIC COMBINED SYSTOLIC (CONGESTIVE) AND DIASTOLIC (CONGESTIVE) HEART FAILURE: ICD-10-CM

## 2024-10-29 PROCEDURE — 93295 DEV INTERROG REMOTE 1/2/MLT: CPT | Mod: S$GLB,,, | Performed by: INTERNAL MEDICINE

## 2024-10-29 PROCEDURE — 93296 REM INTERROG EVL PM/IDS: CPT | Performed by: INTERNAL MEDICINE

## 2024-11-01 LAB
OHS CV AF BURDEN PERCENT: < 1
OHS CV DC REMOTE DEVICE TYPE: NORMAL
OHS CV RV PACING PERCENT: 0 %

## 2025-01-27 ENCOUNTER — CLINICAL SUPPORT (OUTPATIENT)
Dept: CARDIOLOGY | Facility: HOSPITAL | Age: 70
End: 2025-01-27
Payer: MEDICARE

## 2025-01-27 DIAGNOSIS — I50.42 CHRONIC COMBINED SYSTOLIC (CONGESTIVE) AND DIASTOLIC (CONGESTIVE) HEART FAILURE: ICD-10-CM

## 2025-01-27 DIAGNOSIS — Z95.810 PRESENCE OF AUTOMATIC (IMPLANTABLE) CARDIAC DEFIBRILLATOR: ICD-10-CM

## 2025-01-28 ENCOUNTER — CLINICAL SUPPORT (OUTPATIENT)
Dept: CARDIOLOGY | Facility: HOSPITAL | Age: 70
End: 2025-01-28
Attending: INTERNAL MEDICINE
Payer: MEDICARE

## 2025-01-28 DIAGNOSIS — Z95.810 PRESENCE OF AUTOMATIC (IMPLANTABLE) CARDIAC DEFIBRILLATOR: ICD-10-CM

## 2025-01-28 DIAGNOSIS — I50.42 CHRONIC COMBINED SYSTOLIC (CONGESTIVE) AND DIASTOLIC (CONGESTIVE) HEART FAILURE: ICD-10-CM

## 2025-01-28 PROCEDURE — 93296 REM INTERROG EVL PM/IDS: CPT | Performed by: INTERNAL MEDICINE

## 2025-02-17 LAB
OHS CV AF BURDEN PERCENT: < 1
OHS CV DC REMOTE DEVICE TYPE: NORMAL
OHS CV RV PACING PERCENT: 0 %

## 2025-04-04 ENCOUNTER — TELEPHONE (OUTPATIENT)
Dept: CARDIOLOGY | Facility: CLINIC | Age: 70
End: 2025-04-04
Payer: MEDICARE

## 2025-04-04 NOTE — TELEPHONE ENCOUNTER
Could not call number back due to it being incorrect  ----- Message from Kathy sent at 4/4/2025  9:02 AM CDT -----  Contact: Tony Lopez with [facility] .  is calling to speak with the nurse regarding records. Reports needing pacemaker records . Please give Jessica  a call back at [phone]

## 2025-04-29 ENCOUNTER — CLINICAL SUPPORT (OUTPATIENT)
Dept: CARDIOLOGY | Facility: HOSPITAL | Age: 70
End: 2025-04-29
Attending: INTERNAL MEDICINE
Payer: OTHER GOVERNMENT

## 2025-04-29 ENCOUNTER — CLINICAL SUPPORT (OUTPATIENT)
Dept: CARDIOLOGY | Facility: HOSPITAL | Age: 70
End: 2025-04-29
Payer: OTHER GOVERNMENT

## 2025-04-29 DIAGNOSIS — I50.42 CHRONIC COMBINED SYSTOLIC (CONGESTIVE) AND DIASTOLIC (CONGESTIVE) HEART FAILURE: ICD-10-CM

## 2025-04-29 DIAGNOSIS — Z95.810 PRESENCE OF AUTOMATIC (IMPLANTABLE) CARDIAC DEFIBRILLATOR: ICD-10-CM

## 2025-04-29 PROCEDURE — 93295 DEV INTERROG REMOTE 1/2/MLT: CPT | Mod: ,,, | Performed by: INTERNAL MEDICINE

## 2025-04-29 PROCEDURE — 93296 REM INTERROG EVL PM/IDS: CPT | Performed by: INTERNAL MEDICINE

## 2025-05-18 LAB
OHS CV AF BURDEN PERCENT: < 1
OHS CV DC REMOTE DEVICE TYPE: NORMAL
OHS CV RV PACING PERCENT: 0 %

## 2025-07-29 ENCOUNTER — CLINICAL SUPPORT (OUTPATIENT)
Dept: CARDIOLOGY | Facility: HOSPITAL | Age: 70
End: 2025-07-29
Attending: INTERNAL MEDICINE
Payer: MEDICARE

## 2025-07-29 ENCOUNTER — CLINICAL SUPPORT (OUTPATIENT)
Dept: CARDIOLOGY | Facility: HOSPITAL | Age: 70
End: 2025-07-29
Payer: MEDICARE

## 2025-07-29 ENCOUNTER — OFFICE VISIT (OUTPATIENT)
Dept: CARDIOLOGY | Facility: CLINIC | Age: 70
End: 2025-07-29
Payer: OTHER GOVERNMENT

## 2025-07-29 VITALS
WEIGHT: 308.44 LBS | OXYGEN SATURATION: 96 % | HEART RATE: 53 BPM | DIASTOLIC BLOOD PRESSURE: 85 MMHG | BODY MASS INDEX: 43.02 KG/M2 | SYSTOLIC BLOOD PRESSURE: 125 MMHG

## 2025-07-29 DIAGNOSIS — I65.23 CAROTID STENOSIS, ASYMPTOMATIC, BILATERAL: ICD-10-CM

## 2025-07-29 DIAGNOSIS — I50.42 CHRONIC COMBINED SYSTOLIC (CONGESTIVE) AND DIASTOLIC (CONGESTIVE) HEART FAILURE: ICD-10-CM

## 2025-07-29 DIAGNOSIS — Z95.810 PRESENCE OF AUTOMATIC (IMPLANTABLE) CARDIAC DEFIBRILLATOR: ICD-10-CM

## 2025-07-29 DIAGNOSIS — E66.01 MORBID OBESITY: ICD-10-CM

## 2025-07-29 DIAGNOSIS — I50.42 CHRONIC COMBINED SYSTOLIC AND DIASTOLIC CONGESTIVE HEART FAILURE: ICD-10-CM

## 2025-07-29 DIAGNOSIS — I42.8 NONISCHEMIC CARDIOMYOPATHY: ICD-10-CM

## 2025-07-29 DIAGNOSIS — G47.33 OSA (OBSTRUCTIVE SLEEP APNEA): ICD-10-CM

## 2025-07-29 DIAGNOSIS — Z95.810 PRESENCE OF AUTOMATIC (IMPLANTABLE) CARDIAC DEFIBRILLATOR: Primary | ICD-10-CM

## 2025-07-29 DIAGNOSIS — I10 HYPERTENSION, UNSPECIFIED TYPE: ICD-10-CM

## 2025-07-29 DIAGNOSIS — M10.9 ACUTE GOUT INVOLVING TOE OF LEFT FOOT, UNSPECIFIED CAUSE: ICD-10-CM

## 2025-07-29 PROCEDURE — 93296 REM INTERROG EVL PM/IDS: CPT | Performed by: INTERNAL MEDICINE

## 2025-07-29 PROCEDURE — 99999 PR PBB SHADOW E&M-EST. PATIENT-LVL IV: CPT | Mod: PBBFAC,,, | Performed by: INTERNAL MEDICINE

## 2025-07-29 PROCEDURE — 93295 DEV INTERROG REMOTE 1/2/MLT: CPT | Mod: S$GLB,,, | Performed by: INTERNAL MEDICINE

## 2025-07-29 PROCEDURE — 99214 OFFICE O/P EST MOD 30 MIN: CPT | Mod: PBBFAC | Performed by: INTERNAL MEDICINE

## 2025-07-29 PROCEDURE — 99214 OFFICE O/P EST MOD 30 MIN: CPT | Mod: S$PBB,,, | Performed by: INTERNAL MEDICINE

## 2025-07-29 NOTE — PROGRESS NOTES
Subjective:   Patient ID:  07/29/2025      Ron Matthew is a 70 y.o. male who presents for evaluation of Follow-up      History of Present Illness    CHIEF COMPLAINT:  Patient presents today for follow up.    CARDIOVASCULAR:  He has a history of non-ischemic cardiomyopathy with defibrillator placement in June 2023. Cardiac catheterization in February 2024 showed 40% stenosis in LAD, noted to be insufficient to cause cardiomyopathy. He denies current cardiac symptoms. Recent device checks show appropriate defibrillator function with no abnormal readings.    NEUROLOGICAL:  He has a history of stroke with persistent RUE weakness, consistent with carotid-related neurological deficit. He reports two recent falls - one in the office and another in the yard when he lost his balance while outside. He denies any additional falls.    WEIGHT MANAGEMENT:  He reports current weight of 308 lbs, an increase from his previous range of 270-280 lbs. This is his highest recorded weight, noted to be consistent body mass rather than fluid retention.    MEDICAL HISTORY:  He has a history of thyroid surgery with partial thyroid removal and reports both personal and family history of thyroid-related concerns. He has a history of gout but denies any attacks since increasing allopurinol to 300 mg.    CURRENT MEDICATIONS:  Carvedilol, Mediol, Entresto, Jardiance, Bi-max, Aspirin (for stroke prevention), Allopurinol 300 mg, Metformin      ROS:  ROS findings as noted in HPI.         Follow-up  Pertinent negatives include no chest pain.       Past Medical History:   Diagnosis Date    CAD (coronary artery disease)     Carotid artery dissection     CHF (congestive heart failure)     Gout, unspecified     HLD (hyperlipidemia)     HTN (hypertension)     Hyperparathyroidism, unspecified     JAMES (obstructive sleep apnea)     Prostate cancer     SAH (subarachnoid hemorrhage) 01/2023    Stroke 2020    Type 2 diabetes mellitus without  complications        Past Surgical History:   Procedure Laterality Date    ANGIOGRAM, CAROTID      ANGIOGRAM, VERTEBRAL      CATHETERIZATION OF BOTH LEFT AND RIGHT HEART N/A 2/8/2024    Procedure: CATHETERIZATION, HEART, BOTH LEFT AND RIGHT;  Surgeon: Genie Claros MD;  Location: [facility] CATH LAB;  Service: Cardiology;  Laterality: N/A;    INSERTION, ICD, DUAL CHAMBER Left 6/28/2024    Procedure: Insertion, ICD, Dual Chamber/Biotronik;  Surgeon: Genie Claros MD;  Location: [facility] CATH LAB;  Service: Cardiology;  Laterality: Left;    PARATHYROIDECTOMY         Social History[1]    Family History   Problem Relation Name Age of Onset    Diabetes Mother      Hypertension Mother      Hypertension Father           Review of Systems   Cardiovascular:  Negative for chest pain, dyspnea on exertion, palpitations and syncope.   Neurological:  Negative for focal weakness.       Current Outpatient Medications on File Prior to Visit   Medication Sig    allopurinoL (ZYLOPRIM) 300 MG tablet Take 1 tablet (300 mg total) by mouth once daily.    aspirin 81 mg Cap Take 1 capsule every day by oral route.    bumetanide (BUMEX) 1 MG tablet Take 1 tablet (1 mg total) by mouth once daily.    carvediloL (COREG) 25 MG tablet Take 1 tablet (25 mg total) by mouth 2 (two) times daily with meals.    DOCOSAHEXAENOIC ACID ORAL Take 1 capsule by mouth once daily.    empagliflozin (JARDIANCE) 10 mg tablet Take 1 tablet (10 mg total) by mouth once daily.    metFORMIN (GLUCOPHAGE) 1000 MG tablet Take 1,000 mg by mouth 2 (two) times daily with meals.    sacubitriL-valsartan (ENTRESTO) 49-51 mg per tablet Take 1 tablet by mouth 2 (two) times daily.    cephALEXin (KEFLEX) 500 MG capsule Take 1 capsule (500 mg total) by mouth every 12 (twelve) hours. (Patient not taking: Reported on 7/29/2025)    cholecalciferol, vitamin D3, (VITAMIN D3) 50 mcg (2,000 unit) Cap capsule Take 1 capsule by mouth once daily. (Patient not taking: Reported on 7/29/2025)     levothyroxine (SYNTHROID) 25 MCG tablet Take 1 tablet by mouth every morning.    magnesium oxide (MAG-OX) 250 mg magnesium Tab Take 1 tablet by mouth every morning. (Patient not taking: Reported on 7/29/2025)    [DISCONTINUED] clopidogreL (PLAVIX) 75 mg tablet Take 1 tablet (75 mg total) by mouth once daily. (Patient not taking: Reported on 7/29/2025)     No current facility-administered medications on file prior to visit.       Objective:   Objective:  Wt Readings from Last 3 Encounters:   07/29/25 (!) 139.9 kg (308 lb 6.8 oz)   10/08/24 132 kg (291 lb 0.1 oz)   06/28/24 122.5 kg (270 lb)     Temp Readings from Last 3 Encounters:   06/28/24 98.2 °F (36.8 °C) (Temporal)   02/13/24 98.7 °F (37.1 °C)   02/10/24 98 °F (36.7 °C) (Oral)     BP Readings from Last 3 Encounters:   07/29/25 125/85   10/08/24 128/81   06/28/24 135/81     Pulse Readings from Last 3 Encounters:   07/29/25 (!) 53   10/08/24 66   06/28/24 63       Physical Exam    Vitals: Weight: 308 lbs.  General: No acute distress. Well-developed. Well-nourished.  Eyes: EOMI. Sclerae anicteric.  HENT: Normocephalic. Atraumatic. Nares patent. Moist oral mucosa.  Ears: Bilateral TMs clear. Bilateral EACs clear.  Cardiovascular: Regular rate. Regular rhythm. No murmurs. No rubs. No gallops. Normal S1, S2.  Respiratory: Normal respiratory effort. Clear to auscultation bilaterally. No rales. No rhonchi. No wheezing.  Abdomen: Soft. Non-tender. Non-distended. Normoactive bowel sounds. No abdominal fluid/ascites.  Musculoskeletal: No  obvious deformity.  Extremities: No lower extremity edema.  Neurological: Alert & oriented x3. No slurred speech. Normal gait.  Psychiatric: Normal mood. Normal affect. Good insight. Good judgment.  Skin: Warm. Dry. No rash.         Physical Exam  Vitals reviewed.   Constitutional:       Appearance: He is well-developed.   Neck:      Vascular: No carotid bruit.   Cardiovascular:      Rate and Rhythm: Normal rate and regular rhythm.       Pulses: Intact distal pulses.      Heart sounds: Normal heart sounds. No murmur heard.  Pulmonary:      Breath sounds: Normal breath sounds.   Neurological:      Mental Status: He is oriented to person, place, and time.           Lab Results   Component Value Date    CHOL 119 02/03/2023     Lab Results   Component Value Date    LDLCALC 70 02/03/2023         Chemistry        Component Value Date/Time     04/07/2025 0841     06/21/2024 1049    K 4.7 04/07/2025 0841    K 4.7 06/21/2024 1049     04/07/2025 0841     06/21/2024 1049    CO2 26 04/07/2025 0841    CO2 25 06/21/2024 1049    BUN 10 04/07/2025 0841    BUN 20 06/21/2024 1049    CREATININE 0.95 04/07/2025 0841    CREATININE 1.2 06/21/2024 1049    GLU 98 06/21/2024 1049        Component Value Date/Time    CALCIUM 8.2 (L) 04/07/2025 0841    CALCIUM 9.6 06/21/2024 1049    ALKPHOS 72 02/11/2024 0258    AST 28 02/11/2024 0258    ALT 29 02/11/2024 0258    BILITOT 2.0 (H) 02/11/2024 0258    ESTGFRAFRICA 87 04/07/2025 0841    ESTGFRAFRICA 98 08/05/2020 0828          Lab Results   Component Value Date    TSH 1.440 03/15/2024       Lab Results   Component Value Date    WBC 4.36 06/21/2024    HGB 16.8 06/21/2024    HCT 50.4 06/21/2024    MCV 94 06/21/2024     06/21/2024       @LABRCNTIP(BNP,BNPTRIAGEBLO)@       Imaging:  ======    No results found for this or any previous visit.    No results found for this or any previous visit.    No results found for this or any previous visit.      No results found for this or any previous visit.      No valid procedures specified.        No results found for this or any previous visit.      Results for orders placed during the hospital encounter of 06/10/24    Echo    Interpretation Summary    Left Ventricle: The left ventricle is mildly dilated. Normal wall thickness. There is concentric hypertrophy. Moderate global hypokinesis present. There is severely reduced systolic function. Ejection fraction by  visual approximation is 25%. Grade I diastolic dysfunction.    Right Ventricle: Normal right ventricular cavity size. Wall thickness is normal. Systolic function is normal.    Aortic Valve: There is mild aortic regurgitation with a centrally directed jet.    Mitral Valve: There is mild regurgitation with a centrally directed jet.    Tricuspid Valve: There is mild to moderate regurgitation with an eccentrically directed jet.    Pulmonary Artery: The estimated pulmonary artery systolic pressure is 18 mmHg.    IVC/SVC: Normal venous pressure at 3 mmHg.      Results for orders placed during the hospital encounter of 02/06/24    Cardiac catheterization    Conclusion    The ejection fraction was calculated to be 25%.    The pre-procedure left ventricular end diastolic pressure was 29.    The post-procedure left ventricular end diastolic pressure was 33.    The estimated blood loss was none.    There was non-obstructive coronary artery disease..    The filling pressures on the right and left were moderately elevated. Pulmonary hypertension was moderate.    Non ischemic dilated cardiomyopathy    The procedure log was documented by Documenter: Nahomi Reddy RN and verified by Genie Claros MD.    Date: 2/8/2024  Time: 5:21 PM      Lab Results   Component Value Date    HGBA1C 6.5 (H) 02/11/2024         The ASCVD Risk score (Alistair FRAIRE, et al., 2019) failed to calculate for the following reasons:    Risk score cannot be calculated because patient has a medical history suggesting prior/existing ASCVD    Diagnostic Results:  ECG: Reviewed    Assessment and Plan:   Presence of automatic (implantable) cardiac defibrillator    Morbid obesity    Chronic combined systolic (congestive) and diastolic (congestive) heart failure    Nonischemic cardiomyopathy    Chronic gout    Carotid stenosis, asymptomatic, bilateral    Chronic combined systolic and diastolic congestive heart failure    Hypertension, unspecified type    JAMES  (obstructive sleep apnea)        Assessment & Plan    E66.01 Morbid obesity    Non-ischemic cardiomyopathy with implanted defibrillator, functioning well based on device checks.  Continued guideline-directed medical therapy for heart failure, including carvedilol (Mediol), Entresto, and Jardiance.  Discontinued Plavix; continued aspirin 81 mg for stroke prevention.      E66.01 MORBID OBESITY:  - Educated on calorie deficit approach for weight loss, explaining the concept of consuming fewer calories than the body needs.  - Patient to increase physical activity levels, monitor and reduce daily calorie intake (aiming for less than 2000 calories per day), and be mindful of fruit consumption due to sugar content.  - Patient to limit consumption of seafood (especially shrimp and crawfish) and red meat to prevent gout attacks.  - Continued allopurinol 300 mg.  - Continued guideline-directed medical therapy for heart failure, including carvedilol (Mediol), Entresto, and Jardiance.  - Discontinued Plavix; continued aspirin 81 mg for stroke prevention.  - Continued bumex.  - Continued metformin.  - Follow up in 6 months, pending [facility] approval.             Healthy weight goals were discussed in clinic  Reviewed all tests and above medical conditions with patient in detail and formulated treatment plan.             [1]   Social History  Tobacco Use    Smoking status: Never    Smokeless tobacco: Never   Substance Use Topics    Alcohol use: Never    Drug use: Never

## 2025-08-14 LAB
OHS CV AF BURDEN PERCENT: < 1
OHS CV DC REMOTE DEVICE TYPE: NORMAL
OHS CV RV PACING PERCENT: 0 %

## (undated) DEVICE — OMNIPAQUE 300MG 150ML VIAL

## (undated) DEVICE — GUIDE WIRE J-TIP 260CM .025

## (undated) DEVICE — KIT INTRODUCER STIFFEN MICRO

## (undated) DEVICE — ADHESIVE DERMABOND ADVANCED

## (undated) DEVICE — PAD DEFIB CADENCE ADULT R2

## (undated) DEVICE — PAD GROUNDING DISPER ELECTRODE

## (undated) DEVICE — POWDER ARISTA AH 1GM

## (undated) DEVICE — PACK HEART CATH BR

## (undated) DEVICE — WIRE GUIDE TEFLON 3CM .035 145

## (undated) DEVICE — NDL PERCUTANEOUS 21G 7CM VASC

## (undated) DEVICE — KIT GLIDESHEATH SLEND 6FR 10CM

## (undated) DEVICE — CATH JR4 5FR

## (undated) DEVICE — KIT SITE-RITE NDL GUIDE 21G

## (undated) DEVICE — CAUTERY BOVIE PENCIL

## (undated) DEVICE — GUIDEWIRE EMERALD .035IN 260CM

## (undated) DEVICE — CATH JL4 5FR

## (undated) DEVICE — PENCIL SMOKE EVAC ROCKER 70MM

## (undated) DEVICE — SYR BULB 60CC IRRIGATION

## (undated) DEVICE — PACK PACER PERMANENT OMC

## (undated) DEVICE — SUT VICRYL 2-0 CT-2 VCP269H

## (undated) DEVICE — BAND TR COMP DEVICE REG 24CM

## (undated) DEVICE — SCALPEL SZ 10 RETRACTABLE

## (undated) DEVICE — DRESSING AQUACEL AG ADV 3.5X6

## (undated) DEVICE — SUT 3-0 VICRYL / SH (J416)

## (undated) DEVICE — CATH CV QD LUMN 6FRX110CM

## (undated) DEVICE — CATH INFINITI MULTIPAK JR4 5FR

## (undated) DEVICE — DRAPE ANGIO BRACH 38X44IN

## (undated) DEVICE — CATH PIG145 INFINITI 5X110CM